# Patient Record
Sex: FEMALE | Race: WHITE | NOT HISPANIC OR LATINO | Employment: OTHER | URBAN - NONMETROPOLITAN AREA
[De-identification: names, ages, dates, MRNs, and addresses within clinical notes are randomized per-mention and may not be internally consistent; named-entity substitution may affect disease eponyms.]

---

## 2018-12-01 ENCOUNTER — HOSPITAL ENCOUNTER (EMERGENCY)
Facility: HOSPITAL | Age: 68
Discharge: HOME | End: 2018-12-01
Admitting: EMERGENCY MEDICINE

## 2018-12-01 VITALS
SYSTOLIC BLOOD PRESSURE: 143 MMHG | TEMPERATURE: 98.2 F | RESPIRATION RATE: 16 BRPM | DIASTOLIC BLOOD PRESSURE: 77 MMHG | HEART RATE: 98 BPM | BODY MASS INDEX: 27.48 KG/M2 | OXYGEN SATURATION: 100 % | WEIGHT: 140 LBS | HEIGHT: 60 IN

## 2018-12-01 DIAGNOSIS — M54.31 SCIATICA OF RIGHT SIDE: Primary | ICD-10-CM

## 2018-12-01 PROCEDURE — 99283 EMERGENCY DEPT VISIT LOW MDM: CPT

## 2018-12-01 RX ORDER — OXYCODONE HYDROCHLORIDE AND ACETAMINOPHEN 5; 325 MG/1; MG/1
1 TABLET ORAL ONCE
Status: COMPLETED | OUTPATIENT
Start: 2018-12-01 | End: 2018-12-01

## 2018-12-01 RX ORDER — PREDNISONE 20 MG/1
20 TABLET ORAL 2 TIMES DAILY
Qty: 10 TABLET | Refills: 0 | Status: SHIPPED | OUTPATIENT
Start: 2018-12-01 | End: 2019-01-20 | Stop reason: HOSPADM

## 2018-12-01 RX ORDER — ASPIRIN 81 MG/1
81 TABLET, CHEWABLE ORAL DAILY
COMMUNITY

## 2018-12-01 RX ORDER — HYDROCODONE BITARTRATE AND ACETAMINOPHEN 10; 325 MG/1; MG/1
1 TABLET ORAL ONCE
Status: COMPLETED | OUTPATIENT
Start: 2018-12-01 | End: 2018-12-01

## 2018-12-01 RX ORDER — CYCLOBENZAPRINE HCL 10 MG
10 TABLET ORAL EVERY 8 HOURS PRN
Qty: 10 TABLET | Refills: 0 | Status: SHIPPED | OUTPATIENT
Start: 2018-12-01 | End: 2019-01-29

## 2018-12-01 RX ORDER — IBUPROFEN 600 MG/1
600 TABLET ORAL EVERY 8 HOURS PRN
Qty: 30 TABLET | Refills: 0 | Status: SHIPPED | OUTPATIENT
Start: 2018-12-01 | End: 2019-01-29

## 2018-12-01 RX ORDER — DIAZEPAM 5 MG/1
5 TABLET ORAL ONCE
Status: COMPLETED | OUTPATIENT
Start: 2018-12-01 | End: 2018-12-01

## 2018-12-01 RX ADMIN — HYDROCODONE BITARTRATE AND ACETAMINOPHEN 1 TABLET: 10; 325 TABLET ORAL at 16:11

## 2018-12-01 RX ADMIN — DIAZEPAM 5 MG: 5 TABLET ORAL at 17:06

## 2018-12-01 RX ADMIN — OXYCODONE HYDROCHLORIDE AND ACETAMINOPHEN 1 TABLET: 5; 325 TABLET ORAL at 17:06

## 2018-12-01 NOTE — ED NOTES
Patient is a 68 year old female that presents to ER with right hip pain that has been ongoing for the past several months. Patient states that the pain is in her deep right hip and radiates to her knee. Patient states that the pain is a constant pain. Pain radiates from stabbing, deep, achy. Patient reports relief by bending over and removing pressure from her right hip      Yana Crow RN  12/01/18 9544

## 2018-12-01 NOTE — ED PROVIDER NOTES
Subjective   Mrs dang is a 68 year old female patient who presents today with complaints of a worsening of her right hip jesús. She states that she has had chronic posterior right hip pain that radiates toward the knee for 2 years. It has become worse over the past several weeks. She decribes the pain as deep, constant and worse with weightbearing. She states today the pain was unbearable which prompted her to be evaluated. She states she has not already sought out medical treatment because she is afraid of pain from a Cortizone injection. She has tried ibuprofen, heat, ice without sustaining relief. She denies any recent traumas, increased activities, urinary symptoms, fevers, weakness, numbness, tingling, saddle anesthesia or bowel incontinence. She is standing in room and pacing due to pain.        History provided by:  Patient   used: No        Review of Systems   Constitutional: Negative for chills and fever.   HENT: Negative for congestion, rhinorrhea, sore throat and trouble swallowing.    Eyes: Negative.  Negative for visual disturbance.   Respiratory: Negative.  Negative for cough, chest tightness and shortness of breath.    Cardiovascular: Negative.  Negative for chest pain and palpitations.   Gastrointestinal: Negative for abdominal pain, diarrhea, nausea and vomiting.   Endocrine: Negative.    Genitourinary: Negative for decreased urine volume.   Musculoskeletal: Positive for arthralgias (right hip). Negative for back pain and neck stiffness.   Skin: Negative for wound.   Allergic/Immunologic: Negative.    Neurological: Negative.  Negative for dizziness, weakness and headaches.   Hematological: Negative.    Psychiatric/Behavioral: Negative.        Past Medical History:   Diagnosis Date   • Hypertension        No Known Allergies    Past Surgical History:   Procedure Laterality Date   • APPENDECTOMY     • HYSTERECTOMY     • TUBAL ABDOMINAL LIGATION         History reviewed. No  "pertinent family history.    Social History     Socioeconomic History   • Marital status: Single     Spouse name: Not on file   • Number of children: Not on file   • Years of education: Not on file   • Highest education level: Not on file   Tobacco Use   • Smoking status: Current Every Day Smoker     Types: Cigarettes   Substance and Sexual Activity   • Alcohol use: No     Frequency: Never   • Drug use: No       /77   Pulse 98   Temp 98.2 °F (36.8 °C) (Temporal)   Resp 16   Ht 152.4 cm (60\")   Wt 63.5 kg (140 lb)   SpO2 100%   BMI 27.34 kg/m²       Objective   Physical Exam   Constitutional: She is oriented to person, place, and time. She appears well-developed and well-nourished. No distress.   HENT:   Head: Normocephalic and atraumatic.   Right Ear: External ear normal.   Left Ear: External ear normal.   Nose: Nose normal.   Mouth/Throat: Oropharynx is clear and moist.   Eyes: EOM are normal. Pupils are equal, round, and reactive to light.   Neck: Normal range of motion. Neck supple.   Cardiovascular: Normal rate and regular rhythm.   Pulmonary/Chest: Effort normal and breath sounds normal.   Abdominal: Soft. Bowel sounds are normal. There is no tenderness. There is no rebound and no guarding.   Musculoskeletal: Normal range of motion.        Right hip: She exhibits tenderness (posterior hip/gluteal trigger point). She exhibits normal strength, no swelling and no deformity.        Lumbar back: She exhibits normal range of motion, no bony tenderness, no swelling, no edema, no deformity and no laceration.        Legs:  Neurological: She is alert and oriented to person, place, and time. She has normal strength. No cranial nerve deficit or sensory deficit. Coordination normal. GCS eye subscore is 4. GCS verbal subscore is 5. GCS motor subscore is 6.   Skin: Skin is warm and dry.   Psychiatric: She has a normal mood and affect.   Nursing note and vitals reviewed.      Procedures           ED Course  ED " Course as of Dec 01 1841   Sat Dec 01, 2018   1615 Attempted to examine patient, she is pacing, standing, and states she can not be on exam table due to pain. Will give medication and attempt to complete exam  [BA]   1650 Attempt to exam patient, she states some improvement. Attempt to reexamine the patient. She was able to get on the table but could not relax to let me examine. Will order additional medication to attempt to control pain.   [BA]   1839 Attempt again to examine patient. She is laying in bed relaxed. She reports improvement in symptoms. She is declining any further workup, wishes to be discharged home. I have discussed the risks, patient understands. Follow up with PCP/orthopedics on Monday. Return precautions given.  [BA]      ED Course User Index  [BA] Agata Molina, ABELINO                  [facility]      Final diagnoses:   Sciatica of right side            Agata Molina, ABELINO  12/01/18 1841

## 2018-12-01 NOTE — DISCHARGE INSTRUCTIONS
I recommend avoiding heavy weights/exacerbating manuvers x 1 week. I recommend  therapeutic stretches/ Range of motion exercises twice daily. Take steroids as directed, do not take ibuprofen ect while taking steroids. Alternate heat and ice at least 3 times daily. Take NSAID (ibuprofen) after completed prednisone, take as directed and take as food, do not take OTC anti-inflammatories while taking prescription strength NSAID. Monitor for worsening symptoms like numbness and weakness (loss of strength not due to pain). Follow up with primary care provider. For any new or worsening symptoms you may return to ED

## 2018-12-08 ENCOUNTER — HOSPITAL ENCOUNTER (INPATIENT)
Facility: HOSPITAL | Age: 68
LOS: 5 days | Discharge: HOME HEALTH | End: 2018-12-13
Attending: EMERGENCY MEDICINE | Admitting: NEUROLOGICAL SURGERY

## 2018-12-08 ENCOUNTER — APPOINTMENT (OUTPATIENT)
Dept: CT IMAGING | Facility: HOSPITAL | Age: 68
End: 2018-12-08

## 2018-12-08 ENCOUNTER — APPOINTMENT (OUTPATIENT)
Dept: GENERAL RADIOLOGY | Facility: HOSPITAL | Age: 68
End: 2018-12-08

## 2018-12-08 ENCOUNTER — APPOINTMENT (OUTPATIENT)
Dept: MRI IMAGING | Facility: HOSPITAL | Age: 68
End: 2018-12-08

## 2018-12-08 DIAGNOSIS — S32.030D CLOSED COMPRESSION FRACTURE OF L3 LUMBAR VERTEBRA WITH ROUTINE HEALING, SUBSEQUENT ENCOUNTER: ICD-10-CM

## 2018-12-08 DIAGNOSIS — Z74.09 IMPAIRED FUNCTIONAL MOBILITY, BALANCE, GAIT, AND ENDURANCE: ICD-10-CM

## 2018-12-08 DIAGNOSIS — C80.1 MALIGNANT NEOPLASM METASTATIC TO LUMBAR SPINE WITH UNKNOWN PRIMARY SITE (HCC): ICD-10-CM

## 2018-12-08 DIAGNOSIS — Z78.9 DECREASED ACTIVITIES OF DAILY LIVING (ADL): ICD-10-CM

## 2018-12-08 DIAGNOSIS — M84.58XA PATHOLOGICAL FRACTURE OF VERTEBRA DUE TO NEOPLASTIC DISEASE, INITIAL ENCOUNTER: Primary | ICD-10-CM

## 2018-12-08 DIAGNOSIS — N90.89 VULVAR LESION: ICD-10-CM

## 2018-12-08 DIAGNOSIS — C80.1 MALIGNANCY (HCC): ICD-10-CM

## 2018-12-08 DIAGNOSIS — C79.51 MALIGNANT NEOPLASM METASTATIC TO LUMBAR SPINE WITH UNKNOWN PRIMARY SITE (HCC): ICD-10-CM

## 2018-12-08 PROBLEM — S32.030A COMPRESSION FRACTURE OF L3 LUMBAR VERTEBRA: Status: ACTIVE | Noted: 2018-12-08

## 2018-12-08 LAB
ABO GROUP BLD: NORMAL
ANION GAP SERPL CALCULATED.3IONS-SCNC: 8 MMOL/L (ref 4–13)
APTT PPP: 29 SECONDS (ref 24.1–34.8)
APTT PPP: 30.1 SECONDS (ref 24.1–34.8)
BACTERIA UR QL AUTO: ABNORMAL /HPF
BASOPHILS # BLD AUTO: 0.02 10*3/MM3 (ref 0–0.2)
BASOPHILS NFR BLD AUTO: 0.2 % (ref 0–2)
BILIRUB UR QL STRIP: NEGATIVE
BLD GP AB SCN SERPL QL: NEGATIVE
BUN BLD-MCNC: 18 MG/DL (ref 5–21)
BUN/CREAT SERPL: 28.6 (ref 7–25)
CALCIUM SPEC-SCNC: 8.5 MG/DL (ref 8.4–10.4)
CHLORIDE SERPL-SCNC: 99 MMOL/L (ref 98–110)
CLARITY UR: CLEAR
CO2 SERPL-SCNC: 28 MMOL/L (ref 24–31)
COLOR UR: YELLOW
CREAT BLD-MCNC: 0.63 MG/DL (ref 0.5–1.4)
DEPRECATED RDW RBC AUTO: 44 FL (ref 40–54)
EOSINOPHIL # BLD AUTO: 0.11 10*3/MM3 (ref 0–0.7)
EOSINOPHIL NFR BLD AUTO: 0.8 % (ref 0–4)
ERYTHROCYTE [DISTWIDTH] IN BLOOD BY AUTOMATED COUNT: 14.4 % (ref 12–15)
GFR SERPL CREATININE-BSD FRML MDRD: 94 ML/MIN/1.73
GLUCOSE BLD-MCNC: 101 MG/DL (ref 70–100)
GLUCOSE BLDC GLUCOMTR-MCNC: 96 MG/DL (ref 70–130)
GLUCOSE UR STRIP-MCNC: NEGATIVE MG/DL
HCT VFR BLD AUTO: 35.7 % (ref 37–47)
HGB BLD-MCNC: 12.1 G/DL (ref 12–16)
HGB UR QL STRIP.AUTO: ABNORMAL
HYALINE CASTS UR QL AUTO: ABNORMAL /LPF
IMM GRANULOCYTES # BLD: 0.06 10*3/MM3 (ref 0–0.03)
IMM GRANULOCYTES NFR BLD: 0.5 % (ref 0–5)
INR PPP: 0.97 (ref 0.91–1.09)
INR PPP: 0.97 (ref 0.91–1.09)
KETONES UR QL STRIP: NEGATIVE
LEUKOCYTE ESTERASE UR QL STRIP.AUTO: ABNORMAL
LYMPHOCYTES # BLD AUTO: 1.04 10*3/MM3 (ref 0.72–4.86)
LYMPHOCYTES NFR BLD AUTO: 7.8 % (ref 15–45)
MCH RBC QN AUTO: 28.8 PG (ref 28–32)
MCHC RBC AUTO-ENTMCNC: 33.9 G/DL (ref 33–36)
MCV RBC AUTO: 85 FL (ref 82–98)
MONOCYTES # BLD AUTO: 0.57 10*3/MM3 (ref 0.19–1.3)
MONOCYTES NFR BLD AUTO: 4.3 % (ref 4–12)
NEUTROPHILS # BLD AUTO: 11.45 10*3/MM3 (ref 1.87–8.4)
NEUTROPHILS NFR BLD AUTO: 86.4 % (ref 39–78)
NITRITE UR QL STRIP: NEGATIVE
NRBC BLD MANUAL-RTO: 0 /100 WBC (ref 0–0)
PH UR STRIP.AUTO: 7 [PH] (ref 5–8)
PLATELET # BLD AUTO: 444 10*3/MM3 (ref 130–400)
PMV BLD AUTO: 9.3 FL (ref 6–12)
POTASSIUM BLD-SCNC: 4.1 MMOL/L (ref 3.5–5.3)
PROT UR QL STRIP: NEGATIVE
PROTHROMBIN TIME: 13.2 SECONDS (ref 11.9–14.6)
PROTHROMBIN TIME: 13.2 SECONDS (ref 11.9–14.6)
RBC # BLD AUTO: 4.2 10*6/MM3 (ref 4.2–5.4)
RBC # UR: ABNORMAL /HPF
REF LAB TEST METHOD: ABNORMAL
RH BLD: POSITIVE
SODIUM BLD-SCNC: 135 MMOL/L (ref 135–145)
SP GR UR STRIP: 1.02 (ref 1–1.03)
SQUAMOUS #/AREA URNS HPF: ABNORMAL /HPF
T&S EXPIRATION DATE: NORMAL
UROBILINOGEN UR QL STRIP: ABNORMAL
WBC NRBC COR # BLD: 13.25 10*3/MM3 (ref 4.8–10.8)
WBC UR QL AUTO: ABNORMAL /HPF

## 2018-12-08 PROCEDURE — 25010000002 DEXAMETHASONE PER 1 MG: Performed by: EMERGENCY MEDICINE

## 2018-12-08 PROCEDURE — 85730 THROMBOPLASTIN TIME PARTIAL: CPT | Performed by: EMERGENCY MEDICINE

## 2018-12-08 PROCEDURE — 85730 THROMBOPLASTIN TIME PARTIAL: CPT | Performed by: NEUROLOGICAL SURGERY

## 2018-12-08 PROCEDURE — 99221 1ST HOSP IP/OBS SF/LOW 40: CPT | Performed by: OBSTETRICS & GYNECOLOGY

## 2018-12-08 PROCEDURE — 86850 RBC ANTIBODY SCREEN: CPT | Performed by: EMERGENCY MEDICINE

## 2018-12-08 PROCEDURE — 25010000002 KETOROLAC TROMETHAMINE PER 15 MG: Performed by: EMERGENCY MEDICINE

## 2018-12-08 PROCEDURE — 86900 BLOOD TYPING SEROLOGIC ABO: CPT | Performed by: EMERGENCY MEDICINE

## 2018-12-08 PROCEDURE — 71260 CT THORAX DX C+: CPT

## 2018-12-08 PROCEDURE — 94760 N-INVAS EAR/PLS OXIMETRY 1: CPT

## 2018-12-08 PROCEDURE — 0 GADOBENATE DIMEGLUMINE 529 MG/ML SOLUTION: Performed by: EMERGENCY MEDICINE

## 2018-12-08 PROCEDURE — 85610 PROTHROMBIN TIME: CPT | Performed by: EMERGENCY MEDICINE

## 2018-12-08 PROCEDURE — 80048 BASIC METABOLIC PNL TOTAL CA: CPT | Performed by: EMERGENCY MEDICINE

## 2018-12-08 PROCEDURE — 25010000002 IOPAMIDOL 61 % SOLUTION: Performed by: NEUROLOGICAL SURGERY

## 2018-12-08 PROCEDURE — 74177 CT ABD & PELVIS W/CONTRAST: CPT

## 2018-12-08 PROCEDURE — 25010000002 ONDANSETRON PER 1 MG: Performed by: EMERGENCY MEDICINE

## 2018-12-08 PROCEDURE — 94799 UNLISTED PULMONARY SVC/PX: CPT

## 2018-12-08 PROCEDURE — 25010000002 MORPHINE PER 10 MG: Performed by: NEUROLOGICAL SURGERY

## 2018-12-08 PROCEDURE — 25010000002 MORPHINE PER 10 MG: Performed by: EMERGENCY MEDICINE

## 2018-12-08 PROCEDURE — A9577 INJ MULTIHANCE: HCPCS | Performed by: EMERGENCY MEDICINE

## 2018-12-08 PROCEDURE — 0 IOHEXOL 300 MG/ML SOLUTION: Performed by: NEUROLOGICAL SURGERY

## 2018-12-08 PROCEDURE — 25010000002 ORPHENADRINE CITRATE PER 60 MG: Performed by: EMERGENCY MEDICINE

## 2018-12-08 PROCEDURE — 82962 GLUCOSE BLOOD TEST: CPT

## 2018-12-08 PROCEDURE — 86901 BLOOD TYPING SEROLOGIC RH(D): CPT | Performed by: EMERGENCY MEDICINE

## 2018-12-08 PROCEDURE — 73502 X-RAY EXAM HIP UNI 2-3 VIEWS: CPT

## 2018-12-08 PROCEDURE — 99284 EMERGENCY DEPT VISIT MOD MDM: CPT | Performed by: NEUROLOGICAL SURGERY

## 2018-12-08 PROCEDURE — 85610 PROTHROMBIN TIME: CPT | Performed by: NEUROLOGICAL SURGERY

## 2018-12-08 PROCEDURE — 85025 COMPLETE CBC W/AUTO DIFF WBC: CPT | Performed by: EMERGENCY MEDICINE

## 2018-12-08 PROCEDURE — 72131 CT LUMBAR SPINE W/O DYE: CPT

## 2018-12-08 PROCEDURE — 99285 EMERGENCY DEPT VISIT HI MDM: CPT

## 2018-12-08 PROCEDURE — 72158 MRI LUMBAR SPINE W/O & W/DYE: CPT

## 2018-12-08 PROCEDURE — 87086 URINE CULTURE/COLONY COUNT: CPT | Performed by: NEUROLOGICAL SURGERY

## 2018-12-08 PROCEDURE — 63710000001 DEXAMETHASONE PER 0.25 MG: Performed by: NEUROLOGICAL SURGERY

## 2018-12-08 PROCEDURE — 81001 URINALYSIS AUTO W/SCOPE: CPT | Performed by: NEUROLOGICAL SURGERY

## 2018-12-08 PROCEDURE — 25010000002 LORAZEPAM PER 2 MG: Performed by: EMERGENCY MEDICINE

## 2018-12-08 RX ORDER — SODIUM CHLORIDE 9 MG/ML
75 INJECTION, SOLUTION INTRAVENOUS CONTINUOUS
Status: DISCONTINUED | OUTPATIENT
Start: 2018-12-08 | End: 2018-12-10 | Stop reason: SDUPTHER

## 2018-12-08 RX ORDER — LIDOCAINE 50 MG/G
1 PATCH TOPICAL
Status: DISCONTINUED | OUTPATIENT
Start: 2018-12-08 | End: 2018-12-13 | Stop reason: HOSPADM

## 2018-12-08 RX ORDER — MORPHINE SULFATE 2 MG/ML
2 INJECTION, SOLUTION INTRAMUSCULAR; INTRAVENOUS
Status: DISCONTINUED | OUTPATIENT
Start: 2018-12-08 | End: 2018-12-09

## 2018-12-08 RX ORDER — DEXAMETHASONE 4 MG/1
4 TABLET ORAL EVERY 12 HOURS SCHEDULED
Status: DISCONTINUED | OUTPATIENT
Start: 2018-12-08 | End: 2018-12-13 | Stop reason: HOSPADM

## 2018-12-08 RX ORDER — LORAZEPAM 2 MG/ML
1 INJECTION INTRAMUSCULAR ONCE
Status: COMPLETED | OUTPATIENT
Start: 2018-12-08 | End: 2018-12-08

## 2018-12-08 RX ORDER — KETOROLAC TROMETHAMINE 15 MG/ML
15 INJECTION, SOLUTION INTRAMUSCULAR; INTRAVENOUS ONCE
Status: COMPLETED | OUTPATIENT
Start: 2018-12-08 | End: 2018-12-08

## 2018-12-08 RX ORDER — ORPHENADRINE CITRATE 30 MG/ML
60 INJECTION INTRAMUSCULAR; INTRAVENOUS ONCE
Status: COMPLETED | OUTPATIENT
Start: 2018-12-08 | End: 2018-12-08

## 2018-12-08 RX ORDER — BISACODYL 10 MG
10 SUPPOSITORY, RECTAL RECTAL DAILY PRN
Status: DISCONTINUED | OUTPATIENT
Start: 2018-12-08 | End: 2018-12-10 | Stop reason: SDUPTHER

## 2018-12-08 RX ORDER — NALOXONE HCL 0.4 MG/ML
0.4 VIAL (ML) INJECTION
Status: DISCONTINUED | OUTPATIENT
Start: 2018-12-08 | End: 2018-12-09

## 2018-12-08 RX ORDER — OXYCODONE AND ACETAMINOPHEN 7.5; 325 MG/1; MG/1
1 TABLET ORAL EVERY 4 HOURS PRN
Status: DISCONTINUED | OUTPATIENT
Start: 2018-12-08 | End: 2018-12-09

## 2018-12-08 RX ORDER — SODIUM CHLORIDE 0.9 % (FLUSH) 0.9 %
3-10 SYRINGE (ML) INJECTION AS NEEDED
Status: DISCONTINUED | OUTPATIENT
Start: 2018-12-08 | End: 2018-12-10 | Stop reason: SDUPTHER

## 2018-12-08 RX ORDER — DEXAMETHASONE SODIUM PHOSPHATE 10 MG/ML
10 INJECTION INTRAMUSCULAR; INTRAVENOUS ONCE
Status: COMPLETED | OUTPATIENT
Start: 2018-12-08 | End: 2018-12-08

## 2018-12-08 RX ORDER — SENNA AND DOCUSATE SODIUM 50; 8.6 MG/1; MG/1
2 TABLET, FILM COATED ORAL 2 TIMES DAILY PRN
Status: DISCONTINUED | OUTPATIENT
Start: 2018-12-08 | End: 2018-12-10 | Stop reason: SDUPTHER

## 2018-12-08 RX ORDER — ONDANSETRON 2 MG/ML
4 INJECTION INTRAMUSCULAR; INTRAVENOUS ONCE
Status: COMPLETED | OUTPATIENT
Start: 2018-12-08 | End: 2018-12-08

## 2018-12-08 RX ORDER — ACETAMINOPHEN 650 MG/1
650 SUPPOSITORY RECTAL EVERY 4 HOURS PRN
Status: DISCONTINUED | OUTPATIENT
Start: 2018-12-08 | End: 2018-12-13 | Stop reason: HOSPADM

## 2018-12-08 RX ORDER — SODIUM CHLORIDE 0.9 % (FLUSH) 0.9 %
3 SYRINGE (ML) INJECTION EVERY 12 HOURS SCHEDULED
Status: DISCONTINUED | OUTPATIENT
Start: 2018-12-08 | End: 2018-12-10 | Stop reason: SDUPTHER

## 2018-12-08 RX ADMIN — OXYCODONE HYDROCHLORIDE AND ACETAMINOPHEN 1 TABLET: 7.5; 325 TABLET ORAL at 23:06

## 2018-12-08 RX ADMIN — MORPHINE SULFATE 2 MG: 2 INJECTION, SOLUTION INTRAMUSCULAR; INTRAVENOUS at 21:48

## 2018-12-08 RX ADMIN — MORPHINE SULFATE 4 MG: 4 INJECTION INTRAVENOUS at 03:34

## 2018-12-08 RX ADMIN — OXYCODONE HYDROCHLORIDE AND ACETAMINOPHEN 1 TABLET: 7.5; 325 TABLET ORAL at 17:28

## 2018-12-08 RX ADMIN — KETOROLAC TROMETHAMINE 15 MG: 15 INJECTION, SOLUTION INTRAMUSCULAR; INTRAVENOUS at 03:33

## 2018-12-08 RX ADMIN — ORPHENADRINE CITRATE 60 MG: 60 INJECTION INTRAMUSCULAR; INTRAVENOUS at 03:35

## 2018-12-08 RX ADMIN — DEXAMETHASONE SODIUM PHOSPHATE 10 MG: 10 INJECTION INTRAMUSCULAR; INTRAVENOUS at 03:33

## 2018-12-08 RX ADMIN — DEXAMETHASONE 4 MG: 4 TABLET ORAL at 21:48

## 2018-12-08 RX ADMIN — LIDOCAINE 1 PATCH: 50 PATCH CUTANEOUS at 04:03

## 2018-12-08 RX ADMIN — LORAZEPAM 1 MG: 2 INJECTION INTRAMUSCULAR; INTRAVENOUS at 05:55

## 2018-12-08 RX ADMIN — IOHEXOL 50 ML: 300 INJECTION, SOLUTION INTRAVENOUS at 12:36

## 2018-12-08 RX ADMIN — ONDANSETRON HYDROCHLORIDE 4 MG: 2 INJECTION INTRAMUSCULAR; INTRAVENOUS at 03:32

## 2018-12-08 RX ADMIN — OXYCODONE HYDROCHLORIDE AND ACETAMINOPHEN 1 TABLET: 7.5; 325 TABLET ORAL at 11:00

## 2018-12-08 RX ADMIN — SODIUM CHLORIDE 75 ML/HR: 9 INJECTION, SOLUTION INTRAVENOUS at 21:48

## 2018-12-08 RX ADMIN — IOPAMIDOL 150 ML: 612 INJECTION, SOLUTION INTRAVENOUS at 15:59

## 2018-12-08 RX ADMIN — SODIUM CHLORIDE, PRESERVATIVE FREE 3 ML: 5 INJECTION INTRAVENOUS at 21:48

## 2018-12-08 RX ADMIN — MORPHINE SULFATE 2 MG: 2 INJECTION, SOLUTION INTRAMUSCULAR; INTRAVENOUS at 14:28

## 2018-12-08 RX ADMIN — GADOBENATE DIMEGLUMINE 10 ML: 529 INJECTION, SOLUTION INTRAVENOUS at 06:25

## 2018-12-08 NOTE — H&P
Date of Admission: 2018  Primary Care Physician: Provider, No Known        Subjective:    Chief complaint back pain     HPI: 69 yo female with year long history of back pain that has gotten much worse the last several months.  She describes right paraspinal back pain with right lower extremity radicular pain down the posterior aspect of her thigh to her knee.  She was seen in the emergency room about a week ago but could not tolerate the imaging that was required and left.  She was given steroids which were helpful for short period of time.  But the pain gradually got worse until last night.  She describes numbness and tingling in the leg also.  She denies any bowel or bladder issues.  She says that at this point she can barely walk and her mobility is severely limited.  She has not seen a primary care doctor in recent memory.  She has had no colonoscopies Pap smears mammograms recently.    Review of Systems   Musculoskeletal: Positive for back pain.   Neurological: Positive for numbness.   All other systems reviewed and are negative.       Past Medical History:   Past Medical History:   Diagnosis Date   • Hypertension        Past Surgical History:   Past Surgical History:   Procedure Laterality Date   • APPENDECTOMY     • HYSTERECTOMY     • TUBAL ABDOMINAL LIGATION         Family History:Mother  of pneumonia, father  of congestive heart failure.  Social History:  reports that she has been smoking cigarettes.  She does not have any smokeless tobacco history on file. She reports that she does not drink alcohol or use drugs.  75-pack-year smoker.  Fully functional activities of daily living.  Does not drive.    Code Status: full    Medications:    (Not in a hospital admission)    Allergies:  No Known Allergies    Objective:  Physical Exam   Constitutional: She is oriented to person, place, and time. She appears well-developed and well-nourished.   Cardiovascular: Normal rate, regular rhythm and  normal heart sounds.   Pulmonary/Chest: Effort normal. No respiratory distress.   Abdominal: Soft. She exhibits no distension. There is no tenderness.   Neurological: She is oriented to person, place, and time.   Psychiatric: Her speech is normal.       Neurologic Exam     Mental Status   Oriented to person, place, and time.   Speech: speech is normal   Level of consciousness: alert  Knowledge: good.     Cranial Nerves   Cranial nerves II through XII intact.     Motor Exam   Muscle bulk: normal  Overall muscle tone: normal  Right arm pronator drift: absent  Left arm pronator drift: absent    Strength   Strength 5/5 except as noted.   Right iliopsoas: 2/5      Vital Signs  Temp:  [98.4 °F (36.9 °C)] 98.4 °F (36.9 °C)  Heart Rate:  [100-109] 108  Resp:  [14-20] 14  BP: (133-150)/(64-87) 139/76        Results Review:  I reviewed the patient's new clinical results.  I reviewed the patient's new imaging results and agree with the interpretation.  I reviewed the patient's other test results and agree with the interpretation         Lab Results (last 24 hours)     Procedure Component Value Units Date/Time    aPTT [[id number]]  (Normal) Collected:  12/08/18 0454    Specimen:  Blood Updated:  12/08/18 0522     PTT 29.0 seconds     Protime-INR [[id number]]  (Normal) Collected:  12/08/18 0454    Specimen:  Blood Updated:  12/08/18 0522     Protime 13.2 Seconds      INR 0.97    Basic Metabolic Panel [[id number]]  (Abnormal) Collected:  12/08/18 0454    Specimen:  Blood Updated:  12/08/18 0522     Glucose 101 mg/dL      BUN 18 mg/dL      Creatinine 0.63 mg/dL      Sodium 135 mmol/L      Potassium 4.1 mmol/L      Chloride 99 mmol/L      CO2 28.0 mmol/L      Calcium 8.5 mg/dL      eGFR Non African Amer 94 mL/min/1.73      BUN/Creatinine Ratio 28.6     Anion Gap 8.0 mmol/L     Narrative:       GFR Normal >60  Chronic Kidney Disease <60  Kidney Failure <15    CBC & Differential [[id number]] Collected:  12/08/18 0454    Specimen:  Blood  Updated:  12/08/18 0513    Narrative:       The following orders were created for panel order CBC & Differential.  Procedure                               Abnormality         Status                     ---------                               -----------         ------                     CBC Auto Differential[[id number]]        Abnormal            Final result                 Please view results for these tests on the individual orders.    CBC Auto Differential [[id number]]  (Abnormal) Collected:  12/08/18 0454    Specimen:  Blood Updated:  12/08/18 0513     WBC 13.25 10*3/mm3      RBC 4.20 10*6/mm3      Hemoglobin 12.1 g/dL      Hematocrit 35.7 %      MCV 85.0 fL      MCH 28.8 pg      MCHC 33.9 g/dL      RDW 14.4 %      RDW-SD 44.0 fl      MPV 9.3 fL      Platelets 444 10*3/mm3      Neutrophil % 86.4 %      Lymphocyte % 7.8 %      Monocyte % 4.3 %      Eosinophil % 0.8 %      Basophil % 0.2 %      Immature Grans % 0.5 %      Neutrophils, Absolute 11.45 10*3/mm3      Lymphocytes, Absolute 1.04 10*3/mm3      Monocytes, Absolute 0.57 10*3/mm3      Eosinophils, Absolute 0.11 10*3/mm3      Basophils, Absolute 0.02 10*3/mm3      Immature Grans, Absolute 0.06 10*3/mm3      nRBC 0.0 /100 WBC     POC Glucose Once [[id number]]  (Normal) Collected:  12/08/18 0328    Specimen:  Blood Updated:  12/08/18 0348     Glucose 96 mg/dL      Comment: : [id number] Jesse LipscombwMeter ID: [id number]             Imaging Results (last 24 hours)     Procedure Component Value Units Date/Time    CT Lumbar Spine Without Contrast [[id number]] Collected:  12/08/18 0658     Updated:  12/08/18 0735    Addenda:        Addendum: With additional imaging of the spine with MR imaging I am  concerned that the adrenal lesions and L3 fracture most likely represent  sequela of metastatic disease. The L3 vertebral body is somewhat  permeative in appearance which would also suggest the possibility of a  pathologic fracture related to metastatic disease. I  suspect the ventral  mass within the spinal canal is related to neoplastic extension into the  spinal canal. There is a suspected lesion within the left lower lobe on  the upper most cut of today's exam and I suspect the constellation of  findings is related to a primary lung carcinoma with metastatic disease  to the adrenal glands and the L3 vertebral body. I spoke with Rin Ryan in the emergency room at 7:30 AM concerning the findings and  concern for metastatic disease resulting in pathologic fracture at L3.  This report was finalized on 12/08/2018 07:32 by Dr. Tj Paredes MD.  Signed:  12/08/18 0732 by Tj Paredes MD    Narrative:       CT LUMBAR SPINE WO CONTRAST- 12/8/2018 3:54 AM CST     History: low back pain     Comparison: None      DLP: 675 mGy cm. Automated exposure control was utilized to diminish  patient radiation dose.     Technique: Serial helical tomographic images of the lumbar spine were  obtained without the use of intravenous contrast. Additionally, sagittal  and coronal reformatted images were provided for review.      Findings:   Alignment: Normal.     Bones: There is an acute 2 column fracture involving the anterior and  middle column of the L3 vertebral body. There is involvement of the  right L3 pedicle. There is no evidence of retropulsion. Centrally there  is an 80% loss of height. Vertebral body heights are otherwise  maintained.     Disc spaces: Maintained.     Canal and neuroforamina: There is no retropulsion at the L3 fracture  there is a 1.4 x 1.0 cm slightly hyperdense extradural mass with  associated effacement of the thecal sac. I feel this likely represents  an epidural hematoma follow-up with MRI would be recommended if not  contraindicated in this patient. Broad-based bulging of disc as well as  facet and ligamentous hypertrophy at the L3-L4, L4-L5 and L5-S1 level  contributes to central and foraminal stenosis.     Soft tissues: A 6.4 cm right super renal  mass is demonstrated measuring  Hounsfield units of 34 suggesting it to be solid in nature. This would  also warrant follow-up. Also suspect a left adrenal lesion although not  as well-visualized. Metastatic disease is not entirely excluded.       Impression:       Impression:   1. Acute 2 column fracture involving anterior middle columns of the L3  vertebral body with involvement of the right pedicle. There is no  evidence of retropulsion of the posterior aspect of the vertebral body  but there is evidence of thecal sac compression secondary to what I  suspect is an epidural hematoma along the ventral aspect of the thecal  sac. This measures approximately 1.4 x 1.0 cm in size. Follow-up imaging  with MRI is recommended if not contraindicated in this patient.  2. Large right suprarenal mass most likely related to the adrenal gland  but incompletely imaged. Adrenal cortical carcinoma is not excluded  given the size of this lesion. Given the history of trauma and adrenal  hematoma would also be in the differential but considered less likely.  Metastatic disease should also be considered. Some fullness within the  left suprarenal space is also suspicious for a left adrenal lesion  although incompletely imaged..        This report was finalized on 12/08/2018 07:07 by Dr. Tj Paredes MD.    MRI Lumbar Spine With & Without Contrast [[id number]] Collected:  12/08/18 0707     Updated:  12/08/18 0732    Narrative:       MRI LUMBAR SPINE W WO CONTRAST- 12/8/2018 6:05 AM CST     HISTORY: rule out epidural hematoma     COMPARISON: CT exam of earlier the same day.      TECHNIQUE: Multiplanar, multisequence MRI of the lumbar spine was  performed with and without the use of contrast.     FINDINGS:      Alignment: There are presumed to be 5 lumbar-type vertebrae with the  most inferior being labeled L5. Normal lumbar lordosis is present. There  is a 2 column fracture involving the anterior and middle columns of the  L3  vertebral body with an approximate 80% loss of height centrally.  There is no evidence of retropulsion of the vertebral body. There is  diffuse edema within the vertebral body as would be anticipated.. As  suspected by CT examination there is a ventral mass along the posterior  margin of the L3 vertebral body measuring approximately 2.2 cm in  transverse dimension by 1.1 cm in anterior to posterior dimension. This  is resulting in effacement of the thecal sac with moderate central  spinal stenosis. The anterior posterior dimension of the canal is  narrowed to 6 mm at this level.     Marrow signal: There is diffuse abnormal signal within the L3 vertebral  body. There is otherwise normal marrow signal throughout the lumbar  spine. Degenerative disc disease is noted lower 2 lumbar disc levels  with mild disc desiccation..      Cord/Canal: The conus medullaris terminates at the level of L1. The  visualized spinal cord is normal in signal and morphology.      Soft tissues: Paraspinal soft tissue is noted related at the level of  the fractured L3 representing mild paraspinal hematoma versus tumor..  Bilateral adrenal masses are present each of which measure over 6 cm in  size. Given the size of the lesions adenomas are considered unlikely.  Follow-up with CT imaging of the chest, abdomen and pelvis is  recommended. Metastatic disease is not excluded.     Levels:        L1-L2: No disc bulge is present. There is moderate facet and mild  ligamentous hypertrophy. No evidence of central or foraminal stenosis..      L2-L3: There is mild bulging of disc as well as moderate facet and  ligamentous hypertrophy with mild ventral and posterolateral effacement  of the thecal sac. There is no central spinal stenosis. Bulging of the  disc and facet overgrowth does contribute to mild bilateral neural  foraminal narrowing..      L3-L4: Above the L3-L4 disc level there is the previously discussed  epidural mass which is resulting in  effacement of the thecal sac with  moderate to severe central spinal stenosis. At the L3-L4 disc level  there is broad-based bulging disc as well as moderate facet and  ligamentous hypertrophy also contributing to mild central spinal  stenosis. There is severe bilateral neural foraminal narrowing related  to bulging disc and facet overgrowth along the under margin of the  foramen and the epidural hematoma along the upper margin of both  foramen..      L4-L5: There is broad-based bulging of the disc with an annular fissure  along the posterior central annulus. There is moderate facet and  ligamentous hypertrophy with moderate central spinal stenosis. Facet and  ligamentous hypertrophy and bulging of the disc results in severe  bilateral neural foraminal narrowing..      L5-S1: There is bulging of disc with a small central disc protrusion.  There is moderate facet and ligamentous hypertrophy. No evidence of  central spinal stenosis. Severe right-sided and moderate left-sided  foraminal narrowing is present..      Postcontrast: There is diffuse enhancement associated with the L3  vertebral body. There is also enhancement associated with the epidural  mass. Although some enhancement could be anticipated associated with a  fracture the enhancement of the epidural mass I feel is concerning for a  pathologic fracture with epidural extension of neoplasia. Bilateral  bilateral adrenal mass lesions demonstrating peripheral enhancement with  a question with some central necrosis is also suggestive of metastatic  disease.. The right lesion measures 6.1 cm and the left adrenal lesion  6.2 cm in size. Adrenal metastasis should be considered in the  differential. Follow-up imaging of the chest, abdomen and pelvis would  be suggested, particularly if the patient is a smoker.       Impression:       1. Acute 2 column fracture involving the L3 vertebral body involving the  anterior and middle column and right pedicle with associated  edema and  paraspinal soft tissue consistent with either hematoma or neoplasia.  There is no retropulsion of the posterior aspect of the vertebral body  but there is a moderate size epidural mass within the ventral spinal  canal resulting in effacement of the thecal sac and moderate to severe  central spinal stenosis. This is also contributing to neural foraminal  narrowing bilaterally at the L3-L4 disc level related to a combination  of bulging disc and facet overgrowth and the epidural mass. Given the  findings within the adrenals and the enhancement of the epidural mass  I'm concerned this represents a pathologic fracture related to  metastatic disease with associated extension into the ventral epidural  space. The patient does not give a history of any recent trauma making a  posttraumatic fracture at L3 less likely. No other discrete spinal  lesions are demonstrated. I would suggest follow-up with a CT of the  chest, abdomen and pelvis to further evaluate for potential metastatic  disease, particularly if the patient is a smoker. The adrenal lesions  are worrisome for metastatic disease.  2. Central and foraminal stenosis at other levels related to bulging the  disc as well as facet and ligamentous hypertrophy..  3. Bilateral adrenal masses. Follow-up CT imaging is recommended.  Metastatic disease is not excluded.        This report was finalized on 12/08/2018 07:28 by Dr. Tj Paredes MD.    XR Hip With or Without Pelvis 2 - 3 View Right [[id number]] Collected:  12/08/18 0656     Updated:  12/08/18 0700    Narrative:       EXAMINATION: AP pelvis and two-view right hip 12/8/2018     HISTORY: Right hip pain     FINDINGS: AP radiograph of the pelvis as well as two-view exam of the  right hip demonstrates no fracture or dislocation. The joint space is  well-preserved. The femoral head is concentric and well located within  the acetabulum. No evidence of AVN.       Impression:       . Normal exam of the pelvis  and right hip.  This report was finalized on 12/08/2018 06:57 by Dr. Tj Paredes MD.             There are no active hospital problems to display for this patient.      Assessment/Plan:   CT scan and MRI of the lumbar spine with and without contrast demonstrates a pathologic likely metastatic compression fracture of L3 with some ventral epidural extension of tumor.  The radiologist also notes extensive solid tumor in involving both adrenal glands.    The patient immediately admitted to the hospital.  We will treat her pain and treat her symptomatically.  We will mobilize her with physical therapy.  Regarding her compression fracture and metastatic disease at L3 she may ultimately be a candidate for radiofrequency treatment and kyphoplasty at that level she may also need a laminectomy.  In addition we will work up the new diagnosis of possible metastatic cancer with a CT scan of the chest abdomen pelvis and a new pair medicine bone study.    I discussed the patients findings and my recommendations with patient, family and nursing staff    Erwin Benitez MD  12/08/18  8:31 AM    Time: More than 50% of time spent in counseling and coordination of care:  Total face-to-face/floor time 60 min.  Time spent in counseling 30 min. Counseling included the following topics: Diagnosis, condition, treatment, plan

## 2018-12-08 NOTE — ED PROVIDER NOTES
Subjective   History of Present Illness    Review of Systems    Past Medical History:   Diagnosis Date   • Hypertension        No Known Allergies    Past Surgical History:   Procedure Laterality Date   • APPENDECTOMY     • HYSTERECTOMY     • TUBAL ABDOMINAL LIGATION         History reviewed. No pertinent family history.    Social History     Socioeconomic History   • Marital status: Single     Spouse name: Not on file   • Number of children: Not on file   • Years of education: Not on file   • Highest education level: Not on file   Tobacco Use   • Smoking status: Current Every Day Smoker     Types: Cigarettes   Substance and Sexual Activity   • Alcohol use: No     Frequency: Never   • Drug use: No           Objective   Physical Exam    Procedures           ED Course  ED Course as of Dec 08 0855   Sat Dec 08, 2018   0845 Dr Benitez seeing the pt will admit   [TS]   0846 This patient's CT scan was read by stat read as L3 compression fracture and a possible epidural hematoma patient has no evidence of cauda equina syndrome  [TS]   0853 It turns out the patient has 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

## 2018-12-08 NOTE — ED PROVIDER NOTES
"Subjective   69 y/o female arrives for evaluation of right lower back pain for TWO YEARS for which she has refused to follow with anyone but the ED actually coming in twice this month in the last week. The patient denies falls or trauma. She notes the pain has not changed location noting it to her right lower back/hip with radiation down her right leg. She was seen here recently and apparently did not get imaging as she could not \"put my foot down\" however upon review of the NP's chart she was seen \"pacing\" the room towards the end of her visit. She tells me the motrin given her \"doesn't touch anything.\" She denies numbness or tingling, loss of bowel or bladder control, saddle anesthesia, abdominal pain, cp, sob, flank pain, dysuria, hematuria, nausea, vomiting, diarrhea, prior back surgeries, cancer treatments, weight gain or loss, palpitations or any other issues. During our interview she does become somewhat demanding when informed that while I would treat her pain it would, likely, not return to a 0/10 given the chronicity of the symptoms. Further, when cautioned about her age and the effects of muscle relaxer and narcotics on her respiratory drive that could result in death she became even more demanding.         Family, social and past history reviewed as below, prior documentation of H and Ps and other documentation are reviewed:    Past Medical History:  No date: Hypertension    Past Surgical History:  No date: APPENDECTOMY  No date: HYSTERECTOMY  No date: TUBAL ABDOMINAL LIGATION    Social History    Socioeconomic History      Marital status: Single      Spouse name: Not on file      Number of children: Not on file      Years of education: Not on file      Highest education level: Not on file    Social Needs      Financial resource strain: Not on file      Food insecurity - worry: Not on file      Food insecurity - inability: Not on file      Transportation needs - medical: Not on file      Transportation " needs - non-medical: Not on file    Occupational History      Not on file    Tobacco Use      Smoking status: Current Every Day Smoker        Types: Cigarettes    Substance and Sexual Activity      Alcohol use: No        Frequency: Never      Drug use: No      Sexual activity: Not on file    Other Topics      Concerns:        Not on file    Social History Narrative      Not on file      Family history: reviewed and noncontributory             Review of Systems   All other systems reviewed and are negative.      Past Medical History:   Diagnosis Date   • Hypertension        No Known Allergies    Past Surgical History:   Procedure Laterality Date   • APPENDECTOMY     • HYSTERECTOMY     • TUBAL ABDOMINAL LIGATION         History reviewed. No pertinent family history.    Social History     Socioeconomic History   • Marital status: Single     Spouse name: Not on file   • Number of children: Not on file   • Years of education: Not on file   • Highest education level: Not on file   Tobacco Use   • Smoking status: Current Every Day Smoker     Types: Cigarettes   Substance and Sexual Activity   • Alcohol use: No     Frequency: Never   • Drug use: No           Objective   Physical Exam   Constitutional: She is oriented to person, place, and time. She appears well-developed and well-nourished.   HENT:   Head: Normocephalic.   Nose: Nose normal.   Eyes: Conjunctivae and EOM are normal. Pupils are equal, round, and reactive to light.   Abdominal: Soft. Bowel sounds are normal. She exhibits no distension and no mass. There is no tenderness. There is no guarding. No hernia.   Musculoskeletal:   Pedal and dp are 2/4 bilaterally,     +straight leg raise    Intact DTRs    She has pain over the right psis    No midline tenderness, no step offs, no deformities, no CVA tenderness.    Neurological: She is alert and oriented to person, place, and time. She displays normal reflexes. No cranial nerve deficit or sensory deficit. She exhibits  normal muscle tone. Coordination normal.   Strength of calves, hamstrings, quads are 5/5 bilaterally    Sensation is intact.    Skin: Skin is warm. Capillary refill takes less than 2 seconds.   Psychiatric: She has a normal mood and affect. Her behavior is normal.   Vitals reviewed.      Procedures           ED Course    CT shows: acute L3 compression fracture with 50% anterior, 30% posterior height loss. Sublte increased density in the epidural space along the posterior margin of the vertebral body may reprsent epidural hematoma.     Dr. Benitez agrees with MRI    Patient states pain has abated, she is lifting both legs from the bed at this time without issue. Aware of MRI.     MRI tech called in emergently    Endorsed to Dr. 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 Dominant mass within the left lower lobe of the lung. There is   associated hilar and middle mediastinal adenopathy. A left paraspinal   cristi mass is also present as well as a satellite lesion and pleural   studding.. Also noted is an enlarged interaortocaval node and some   enlarged periportal nodes. There is a ill-defined hypodense lesion   involving the posterior interpolar aspect of the left kidney suspicious   for metastatic disease. No evidence of hepatic lesions. Marked   enlargement and heterogeneity is noted of both adrenal glands consistent   with large adrenal metastases. Given the appearance of the lesions and   distribution I suspect this may  represent a small cell lung carcinoma. A   borderline enlarged left para-aortic node is also present.   2. Diverticulosis of the descending and sigmoid colon without evidence   of acute diverticulitis. No evidence of mechanical bowel obstruction..    3. Moderate two column fracture at the L3 level with a epidural mass   along the posterior margin of the vertebral body. This vertebral body is   rather permeative in appearance and I suspect this represents a   pathologic fracture related to metastatic disease with epidural   extension.           This report was finalized on 12/08/2018 16:54 by Dr. Tj Paredes MD.      CT Chest With Contrast   Final Result   1. Large mass lesion within the left lower lobe. This is contiguous with   the left inferior hilar structures with associated left perihilar   lymphadenopathy. There is also evidence of metastatic disease to the   middle mediastinum at the level of the more distal esophagus. The   margins of the esophagus are difficult to delineate at this level and I   suspect there is secondary involvement of the esophagus. There is also   an enlarged prevascular node as well as a paraspinal cristi mass at the   level of the left lung base. Bilateral adrenal metastases are present.   There is periportal and interaortocaval adenopathy within the upper   abdomen. Satellite lesions are noted within the left lower lobe. There   is nodular thickening of the major fissure on the left as well   consistent with pleural studding..   2. Coronary calcifications as described above.   3. There is some effacement of the left main pulmonary artery along its   posterior aspect related to the hilar cristi component of the mass..           This report was finalized on 12/08/2018 16:41 by Dr. Tj Paredes MD.      CT Lumbar Spine Without Contrast   Final Result   Addendum 1 of 1   Addendum: With additional imaging of the spine with MR imaging I am   concerned that the adrenal lesions and  L3 fracture most likely represent   sequela of metastatic disease. The L3 vertebral body is somewhat   permeative in appearance which would also suggest the possibility of a   pathologic fracture related to metastatic disease. I suspect the ventral   mass within the spinal canal is related to neoplastic extension into the   spinal canal. There is a suspected lesion within the left lower lobe on   the upper most cut of today's exam and I suspect the constellation of   findings is related to a primary lung carcinoma with metastatic disease   to the adrenal glands and the L3 vertebral body. I spoke with Rin Ryan in the emergency room at 7:30 AM concerning the findings and   concern for metastatic disease resulting in pathologic fracture at L3.   This report was finalized on 12/08/2018 07:32 by Dr. Tj Paredes MD.      Final   Impression:    1. Acute 2 column fracture involving anterior middle columns of the L3   vertebral body with involvement of the right pedicle. There is no   evidence of retropulsion of the posterior aspect of the vertebral body   but there is evidence of thecal sac compression secondary to what I   suspect is an epidural hematoma along the ventral aspect of the thecal   sac. This measures approximately 1.4 x 1.0 cm in size. Follow-up imaging   with MRI is recommended if not contraindicated in this patient.   2. Large right suprarenal mass most likely related to the adrenal gland   but incompletely imaged. Adrenal cortical carcinoma is not excluded   given the size of this lesion. Given the history of trauma and adrenal   hematoma would also be in the differential but considered less likely.   Metastatic disease should also be considered. Some fullness within the   left suprarenal space is also suspicious for a left adrenal lesion   although incompletely imaged..           This report was finalized on 12/08/2018 07:07 by Dr. Tj Paredes MD.      MRI Lumbar Spine With & Without  Contrast   Final Result   1. Acute 2 column fracture involving the L3 vertebral body involving the   anterior and middle column and right pedicle with associated edema and   paraspinal soft tissue consistent with either hematoma or neoplasia.   There is no retropulsion of the posterior aspect of the vertebral body   but there is a moderate size epidural mass within the ventral spinal   canal resulting in effacement of the thecal sac and moderate to severe   central spinal stenosis. This is also contributing to neural foraminal   narrowing bilaterally at the L3-L4 disc level related to a combination   of bulging disc and facet overgrowth and the epidural mass. Given the   findings within the adrenals and the enhancement of the epidural mass   I'm concerned this represents a pathologic fracture related to   metastatic disease with associated extension into the ventral epidural   space. The patient does not give a history of any recent trauma making a   posttraumatic fracture at L3 less likely. No other discrete spinal   lesions are demonstrated. I would suggest follow-up with a CT of the   chest, abdomen and pelvis to further evaluate for potential metastatic   disease, particularly if the patient is a smoker. The adrenal lesions   are worrisome for metastatic disease.   2. Central and foraminal stenosis at other levels related to bulging the   disc as well as facet and ligamentous hypertrophy..   3. Bilateral adrenal masses. Follow-up CT imaging is recommended.   Metastatic disease is not excluded.           This report was finalized on 12/08/2018 07:28 by Dr. Tj Paredes MD.      XR Hip With or Without Pelvis 2 - 3 View Right   ED Interpretation   No fracture      Final Result   . Normal exam of the pelvis and right hip.   This report was finalized on 12/08/2018 06:57 by Dr. Tj Paredes MD.      NM Bone Scan Whole Body    (Results Pending)     Labs Reviewed   BASIC METABOLIC PANEL - Abnormal; Notable  for the following components:       Result Value    Glucose 101 (*)     BUN/Creatinine Ratio 28.6 (*)     All other components within normal limits    Narrative:     GFR Normal >60  Chronic Kidney Disease <60  Kidney Failure <15   CBC WITH AUTO DIFFERENTIAL - Abnormal; Notable for the following components:    WBC 13.25 (*)     Hematocrit 35.7 (*)     Platelets 444 (*)     Neutrophil % 86.4 (*)     Lymphocyte % 7.8 (*)     Neutrophils, Absolute 11.45 (*)     Immature Grans, Absolute 0.06 (*)     All other components within normal limits   APTT - Normal   PROTIME-INR - Normal   APTT - Normal   PROTIME-INR - Normal   POCT GLUCOSE FINGERSTICK - Normal   URINALYSIS W/ CULTURE IF INDICATED   POCT GLUCOSE FINGERSTICK   TYPE AND SCREEN   CBC AND DIFFERENTIAL    Narrative:     The following orders were created for panel order CBC & Differential.  Procedure                               Abnormality         Status                     ---------                               -----------         ------                     CBC Auto Differential[[id number]]        Abnormal            Final result                 Please view results for these tests on the individual orders.                 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

## 2018-12-08 NOTE — ED NOTES
Patient states she has sciatica and has had pain starting in her right buttock and radiating down her to her right knee x2 weeks.     Gopi Molina RN  12/08/18 6874

## 2018-12-09 ENCOUNTER — PREP FOR SURGERY (OUTPATIENT)
Dept: OTHER | Facility: HOSPITAL | Age: 68
End: 2018-12-09

## 2018-12-09 DIAGNOSIS — C80.1 MALIGNANT NEOPLASM METASTATIC TO LUMBAR SPINE WITH UNKNOWN PRIMARY SITE (HCC): Primary | ICD-10-CM

## 2018-12-09 DIAGNOSIS — C79.51 MALIGNANT NEOPLASM METASTATIC TO LUMBAR SPINE WITH UNKNOWN PRIMARY SITE (HCC): Primary | ICD-10-CM

## 2018-12-09 PROCEDURE — 99232 SBSQ HOSP IP/OBS MODERATE 35: CPT | Performed by: OBSTETRICS & GYNECOLOGY

## 2018-12-09 PROCEDURE — 94760 N-INVAS EAR/PLS OXIMETRY 1: CPT

## 2018-12-09 PROCEDURE — 25010000002 ONDANSETRON PER 1 MG: Performed by: NEUROLOGICAL SURGERY

## 2018-12-09 PROCEDURE — 99231 SBSQ HOSP IP/OBS SF/LOW 25: CPT | Performed by: NEUROLOGICAL SURGERY

## 2018-12-09 PROCEDURE — 94799 UNLISTED PULMONARY SVC/PX: CPT

## 2018-12-09 PROCEDURE — 63710000001 DEXAMETHASONE PER 0.25 MG: Performed by: NEUROLOGICAL SURGERY

## 2018-12-09 PROCEDURE — 25010000002 MORPHINE PER 10 MG: Performed by: NEUROLOGICAL SURGERY

## 2018-12-09 RX ORDER — NALOXONE HCL 0.4 MG/ML
0.4 VIAL (ML) INJECTION
Status: DISCONTINUED | OUTPATIENT
Start: 2018-12-09 | End: 2018-12-10 | Stop reason: SDUPTHER

## 2018-12-09 RX ORDER — BUPIVACAINE HCL/0.9 % NACL/PF 0.1 %
2 PLASTIC BAG, INJECTION (ML) EPIDURAL ONCE
Status: DISCONTINUED | OUTPATIENT
Start: 2018-12-09 | End: 2018-12-09

## 2018-12-09 RX ORDER — ALPRAZOLAM 0.5 MG/1
0.5 TABLET ORAL EVERY 8 HOURS PRN
Status: DISCONTINUED | OUTPATIENT
Start: 2018-12-09 | End: 2018-12-13 | Stop reason: HOSPADM

## 2018-12-09 RX ORDER — OXYCODONE AND ACETAMINOPHEN 7.5; 325 MG/1; MG/1
2 TABLET ORAL EVERY 4 HOURS PRN
Status: DISCONTINUED | OUTPATIENT
Start: 2018-12-09 | End: 2018-12-10 | Stop reason: SDUPTHER

## 2018-12-09 RX ORDER — BUPIVACAINE HCL/0.9 % NACL/PF 0.1 %
2 PLASTIC BAG, INJECTION (ML) EPIDURAL ONCE
Status: DISCONTINUED | OUTPATIENT
Start: 2018-12-10 | End: 2018-12-09

## 2018-12-09 RX ORDER — ONDANSETRON 2 MG/ML
4 INJECTION INTRAMUSCULAR; INTRAVENOUS EVERY 6 HOURS PRN
Status: DISCONTINUED | OUTPATIENT
Start: 2018-12-09 | End: 2018-12-10 | Stop reason: SDUPTHER

## 2018-12-09 RX ORDER — BUPIVACAINE HCL/0.9 % NACL/PF 0.1 %
2 PLASTIC BAG, INJECTION (ML) EPIDURAL ONCE
Status: CANCELLED | OUTPATIENT
Start: 2018-12-09 | End: 2018-12-09

## 2018-12-09 RX ADMIN — DEXAMETHASONE 4 MG: 4 TABLET ORAL at 21:06

## 2018-12-09 RX ADMIN — OXYCODONE HYDROCHLORIDE AND ACETAMINOPHEN 2 TABLET: 7.5; 325 TABLET ORAL at 09:48

## 2018-12-09 RX ADMIN — LIDOCAINE 1 PATCH: 50 PATCH CUTANEOUS at 09:01

## 2018-12-09 RX ADMIN — SODIUM CHLORIDE 75 ML/HR: 9 INJECTION, SOLUTION INTRAVENOUS at 10:43

## 2018-12-09 RX ADMIN — OXYCODONE HYDROCHLORIDE AND ACETAMINOPHEN 1 TABLET: 7.5; 325 TABLET ORAL at 06:06

## 2018-12-09 RX ADMIN — ALPRAZOLAM 0.5 MG: 0.5 TABLET ORAL at 12:19

## 2018-12-09 RX ADMIN — MORPHINE SULFATE 4 MG: 4 INJECTION INTRAVENOUS at 17:37

## 2018-12-09 RX ADMIN — MORPHINE SULFATE 2 MG: 2 INJECTION, SOLUTION INTRAMUSCULAR; INTRAVENOUS at 07:34

## 2018-12-09 RX ADMIN — ALPRAZOLAM 0.5 MG: 0.5 TABLET ORAL at 21:11

## 2018-12-09 RX ADMIN — OXYCODONE HYDROCHLORIDE AND ACETAMINOPHEN 2 TABLET: 7.5; 325 TABLET ORAL at 14:07

## 2018-12-09 RX ADMIN — MORPHINE SULFATE 2 MG: 2 INJECTION, SOLUTION INTRAMUSCULAR; INTRAVENOUS at 00:24

## 2018-12-09 RX ADMIN — ONDANSETRON 4 MG: 2 INJECTION INTRAMUSCULAR; INTRAVENOUS at 12:19

## 2018-12-09 RX ADMIN — MORPHINE SULFATE 4 MG: 4 INJECTION INTRAVENOUS at 23:19

## 2018-12-09 RX ADMIN — DEXAMETHASONE 4 MG: 4 TABLET ORAL at 09:01

## 2018-12-09 RX ADMIN — MORPHINE SULFATE 4 MG: 4 INJECTION INTRAVENOUS at 11:12

## 2018-12-09 RX ADMIN — OXYCODONE HYDROCHLORIDE AND ACETAMINOPHEN 2 TABLET: 7.5; 325 TABLET ORAL at 19:44

## 2018-12-09 RX ADMIN — MORPHINE SULFATE 4 MG: 4 INJECTION INTRAVENOUS at 09:01

## 2018-12-09 RX ADMIN — MORPHINE SULFATE 2 MG: 2 INJECTION, SOLUTION INTRAMUSCULAR; INTRAVENOUS at 04:53

## 2018-12-09 NOTE — PROGRESS NOTES
"   LOS: 1 day   Patient Care Team:  Provider, No Known as PCP - General    Chief Complaint: Vulvar lesion    Subjective     Patient chart is reviewed.  Updated noted.        Subjective:  Symptoms:  Stable.    Diet:  Poor intake.    Activity level: Impaired due to weakness.    Pain:  She complains of pain that is moderate.  She reports pain is unchanged.  Pain is partially controlled.        History taken from: patient    Objective     Vital Signs  Temp:  [97.9 °F (36.6 °C)-98.6 °F (37 °C)] 97.9 °F (36.6 °C)  Heart Rate:  [] 99  Resp:  [14-18] 18  BP: (113-162)/(78-91) 154/78    Objective:  General Appearance:  Comfortable.    Vital signs: (most recent): Blood pressure 154/78, pulse 99, temperature 97.9 °F (36.6 °C), temperature source Oral, resp. rate 18, height 152.4 cm (60\"), weight 62.3 kg (137 lb 6.4 oz), SpO2 95 %.  Vital signs are normal.    Output: Producing urine.    Lungs:  Normal effort and normal respiratory rate.    Heart: Normal rate.  Regular rhythm.    Abdomen: Abdomen is soft.  Bowel sounds are normal.   There is no abdominal tenderness.     Neurological: Patient is alert and oriented to person, place and time.    Skin:  Warm and dry.              Results Review:     I reviewed the patient's new clinical results.    Medication Review: Completed    Assessment/Plan       Compression fracture of L3 lumbar vertebra (CMS/HCC)  Vulvar lesion    Assessment:    Condition: In stable condition.  Unchanged.         Plans to go to the OR tomorrow for vulvar and vaginal biopsies.  Patient consented.  She verbalizes understanding and agreement.    Alonzo Lugo MD  12/09/18  9:59 AM    Time: More than 50% of time spent in counseling and coordination of care:  Total face-to-face/floor time 30 min.  Time spent in counseling 20 min. Counseling included the following topics: Surgical counseling including the risks, benefits, and alternatives discussed.  Risk of bleeding and infection discussed.  Need for " tissue diagnosis discussed.

## 2018-12-09 NOTE — CONSULTS
[facility]  Kasey Rios  : 1950  MRN: [id number]  [facility]: [id number]    History and Physical    Subjective   Kasey Rios is a 68 y.o. year old  who was admitted due to back pain and difficulty walking by neurosurgery.  GYN was consulted by Erwin Benitez MD for help with vaginal irritation and pain.  She noted the vaginal irritation and pain to begin approximately 1 month ago.  She says that since her back pain and her difficulty in mobilization, she has been having difficulty with getting to the bathroom.  As a result, she is experiencing urinary incontinence.  She started using pads that were frequently wet.  She has been using hemorrhoid cream without relief.  She denies any vaginal bleeding.  She reports 3 previous vaginal deliveries at term.  She reports a previous abdominal hysterectomy with bilateral salpingo-oophorectomy for benign disease but this was done several decades ago.  No records are available for review.  She has not had Pap smears in quite some time, however, she reports normal Pap smears prior to her hysterectomy.  Again, it sounds as though she had a hysterectomy for abnormal bleeding and benign processes.  Her surgical history is also significant for a previous ectopic pregnancy which require surgical intervention.  She has not been sexually active for more than 20 years.    Admitting H&P is reviewed.  Labs and images are reviewed.  CT scan of the pelvis does not show any acute abnormality.  However, there are findings on her imaging concerning for possible primary lung malignancy with possible metastatic disease.      Past Medical History:   Diagnosis Date   • Hypertension        Past Surgical History:   Procedure Laterality Date   • APPENDECTOMY     • ECTOPIC PREGNANCY      Ruptured ectopic   • TOTAL ABDOMINAL HYSTERECTOMY WITH SALPINGO OOPHORECTOMY      DUB, Fibroids, benign       Social History     Socioeconomic History   • Marital status: Single     Spouse  name: Not on file   • Number of children: Not on file   • Years of education: Not on file   • Highest education level: Not on file   Social Needs   • Financial resource strain: Not on file   • Food insecurity - worry: Not on file   • Food insecurity - inability: Not on file   • Transportation needs - medical: Not on file   • Transportation needs - non-medical: Not on file   Occupational History   • Not on file   Tobacco Use   • Smoking status: Current Every Day Smoker     Types: Cigarettes   Substance and Sexual Activity   • Alcohol use: No     Frequency: Never   • Drug use: No   • Sexual activity: Not on file   Other Topics Concern   • Not on file   Social History Narrative   • Not on file       History reviewed. No pertinent family history.      Current Facility-Administered Medications:   •  acetaminophen (TYLENOL) suppository 650 mg, 650 mg, Rectal, Q4H PRN, Erwin Benitez MD  •  bisacodyl (DULCOLAX) suppository 10 mg, 10 mg, Rectal, Daily PRN, Erwin Benitez MD  •  dexamethasone (DECADRON) tablet 4 mg, 4 mg, Oral, Q12H, Erwin Benitez MD  •  lidocaine (LIDODERM) 5 % 1 patch, 1 patch, Transdermal, Q24H, Ant Castano MD, 1 patch at 12/08/18 0403  •  Morphine sulfate (PF) injection 2 mg, 2 mg, Intravenous, Q2H PRN, 2 mg at 12/08/18 1428 **AND** naloxone (NARCAN) injection 0.4 mg, 0.4 mg, Intravenous, Q5 Min PRN, Erwin Benitez MD  •  oxyCODONE-acetaminophen (PERCOCET) 7.5-325 MG per tablet 1 tablet, 1 tablet, Oral, Q4H PRN, Erwin Benitez MD, 1 tablet at 12/08/18 1728  •  sennosides-docusate sodium (SENOKOT-S) 8.6-50 MG tablet 2 tablet, 2 tablet, Oral, BID PRN, Erwin Benitez MD  •  sodium chloride 0.9 % flush 3 mL, 3 mL, Intravenous, Q12H, Erwin Benitez MD  •  sodium chloride 0.9 % flush 3-10 mL, 3-10 mL, Intravenous, PRN, Erwin Benitez MD  •  sodium chloride 0.9 % infusion, 75 mL/hr, Intravenous, Continuous, Erwin Benitez MD    No Known Allergies    Obstetric History    G4   " P3   T3      L3     SAB0   TAB0   Ectopic1   Molar0   Multiple0   Live Births3        Review of Systems   Constitutional: Positive for activity change. Negative for appetite change and unexpected weight change.   Respiratory: Positive for shortness of breath. Negative for chest tightness.    Cardiovascular: Negative for chest pain and leg swelling.   Gastrointestinal: Negative for abdominal pain and anal bleeding.   Endocrine: Negative for cold intolerance and heat intolerance.   Genitourinary: Positive for difficulty urinating, genital sores, pelvic pain and vaginal pain. Negative for vaginal bleeding and vaginal discharge.   Neurological: Negative for headaches.   Hematological: Does not bruise/bleed easily.   Psychiatric/Behavioral: Negative for confusion and decreased concentration.           Objective     /81 (BP Location: Left arm, Patient Position: Lying)   Pulse 95   Temp 98 °F (36.7 °C) (Oral)   Resp 16   Ht 152.4 cm (60\")   Wt 62.3 kg (137 lb 6.4 oz)   SpO2 92%   BMI 26.83 kg/m²     Physical Exam   Physical Exam   Constitutional: She is oriented to person, place, and time. She appears well-developed and well-nourished. No distress.   HENT:   Head: Normocephalic and atraumatic.   Neck: Normal range of motion. Neck supple. No thyromegaly present.   Cardiovascular: Normal rate and regular rhythm.   Pulmonary/Chest: Effort normal and breath sounds normal.   Abdominal: Soft. Bowel sounds are normal. Hernia confirmed negative in the right inguinal area and confirmed negative in the left inguinal area.   Genitourinary:       Pelvic exam was performed with patient supine. No labial fusion. There is tenderness and lesion on the right labia. There is no tenderness or lesion on the left labia.       Lymphadenopathy:     She has no cervical adenopathy. No inguinal adenopathy noted on the right or left side.   Neurological: She is alert and oriented to person, place, and time.   Skin: Skin is warm " and dry. She is not diaphoretic.   Psychiatric: She has a normal mood and affect. Her behavior is normal. Judgment and thought content normal.   Nursing note and vitals reviewed.      Labs  Lab Results   Component Value Date     (H) 12/08/2018    HGB 12.1 12/08/2018    HCT 35.7 (L) 12/08/2018    WBC 13.25 (H) 12/08/2018     12/08/2018    K 4.1 12/08/2018    CL 99 12/08/2018    CO2 28.0 12/08/2018    BUN 18 12/08/2018    CREATININE 0.63 12/08/2018    GLUCOSE 101 (H) 12/08/2018    CALCIUM 8.5 12/08/2018       Radiology Studies  Imaging Results (last 72 hours)     Procedure Component Value Units Date/Time    CT Abdomen Pelvis With Contrast [[id number]] Collected:  12/08/18 1643     Updated:  12/08/18 1657    Narrative:       CT ABDOMEN PELVIS W CONTRAST- 12/8/2018 3:58 PM CST     HISTORY: Neoplasm: abdomen, other primary, suspected;  M84.58XA-Pathological fracture in neoplastic disease, other specified  site, initial encounter for fracture; C80.1-Malignant (primary)  neoplasm, unspecified       COMPARISON: None.      DLP: 530 mGy cm. Automated exposure control was utilized to diminish  patient radiation dose.     TECHNIQUE: Following the oral ingestion and intravenous administration  of contrast, helical CT tomographic images of the abdomen and pelvis  were acquired. Coronal reformatted images were also provided for review.        FINDINGS:   There is a large lobular neoplastic mass within the left lower lobe.  There is associated studding of the pleura and major fissure on the  left. A paraspinal mass is also present at the level of the lesion and  the lesion is contiguous with the left infrahilar structures with left  hilar and infrahilar lymphadenopathy. There is also a middle mediastinal  cristi mass inseparable from the distal esophagus and I'm concerned that  there is most likely involvement with the esophagus from this component  of the neoplasm. The right lung bases clear. There is no  pleural  effusion..      LIVER: No focal liver lesion. The hepatic vasculature is patent.      BILIARY SYSTEM: The gallbladder is unremarkable. No intrahepatic or  extrahepatic ductal dilatation.      PANCREAS: No focal pancreatic lesion.      SPLEEN: Unremarkable.      KIDNEYS AND ADRENALS: Bilateral necrotic adrenal metastases are present.  These were further discussed in the CT of the chest report. Both of  these measure more than 6 cm in size set. There is an ill-defined  hypodensity associated with the posterior interpolar aspect of the left  kidney measuring approximately 1.6 cm in size. I suspect that this may  also represent a metastasis to the left kidney. The kidneys otherwise  demonstrate normal enhancement.. The ureters are decompressed and normal  in appearance.     RETROPERITONEUM: There is ectasia of the infrarenal abdominal aorta. An  enlarged necrotic interaortocaval node is present measuring 2.2 x 2.1 cm  in size. A 10 mm short axis left paraortic node is also present..      GI TRACT: Diverticular disease is noted of the descending and sigmoid  colon without evidence of acute diverticulitis. There is no evidence of  mechanical bowel obstruction.. The appendix is surgically absent..     OTHER: No evidence of a mesenteric mass. Some enlarged periportal nodes  are present including a 12 mm short axis and 13 mm short axis node just  above the main portal vein in the landon hepatis. Some smaller nodes are  noted within the gastrohepatic ligament as well.. The abdominopelvic  vasculature is patent. There is a moderate compression fracture which is  a 2 column fracture involving the L3 vertebral body. Along the posterior  margin of this fracture is a epidural soft tissue mass. The bone is  rather permeative in appearance and I suspect this represents a  pathologic fracture related to bony metastases. A rind of soft tissue  density is also noted adjacent to the vertebral body. No other discrete  lesions are  demonstrated.. No free fluid or localized inflammation is  present.      PELVIS: No mass lesion, fluid collection or significant lymphadenopathy  is seen in the pelvis. The patient's undergone prior hysterectomy. The  urinary bladder is unremarkable..       Impression:       1. Dominant mass within the left lower lobe of the lung. There is  associated hilar and middle mediastinal adenopathy. A left paraspinal  cristi mass is also present as well as a satellite lesion and pleural  studding.. Also noted is an enlarged interaortocaval node and some  enlarged periportal nodes. There is a ill-defined hypodense lesion  involving the posterior interpolar aspect of the left kidney suspicious  for metastatic disease. No evidence of hepatic lesions. Marked  enlargement and heterogeneity is noted of both adrenal glands consistent  with large adrenal metastases. Given the appearance of the lesions and  distribution I suspect this may represent a small cell lung carcinoma. A  borderline enlarged left para-aortic node is also present.  2. Diverticulosis of the descending and sigmoid colon without evidence  of acute diverticulitis. No evidence of mechanical bowel obstruction..   3. Moderate two column fracture at the L3 level with a epidural mass  along the posterior margin of the vertebral body. This vertebral body is  rather permeative in appearance and I suspect this represents a  pathologic fracture related to metastatic disease with epidural  extension.        This report was finalized on 12/08/2018 16:54 by Dr. Tj Paredes MD.    CT Chest With Contrast [[id number]] Collected:  12/08/18 1630     Updated:  12/08/18 1644    Narrative:       CT CHEST W CONTRAST- 12/8/2018 3:58 PM CST     HISTORY: Neoplasm: chest, heme/lymphatic, recurrence, suspected/known;  M84.58XA-Pathological fracture in neoplastic disease, other specified  site, initial encounter for fracture; C80.1-Malignant (primary)  neoplasm, unspecified       COMPARISON: None      DLP: 340 mGy cm. Automated exposure control was utilized to diminish  patient radiation dose.     TECHNIQUE: Serial helical tomographic images of the chest were acquired  following the infusion of Isovue contrast intravenously. Bone and soft  tissue algorithms were provided.       FINDINGS:   Neck base: The imaged portion of the neck and thyroid gland is  unremarkable.      Lungs: There are moderate changes of centrilobular emphysema. There is a  heterogeneously enhancing 5.6 x 5.7 cm lobular mass within the left  lower lobe. A peripheral satellite lesion also noted within left lower  lobe measures 1.3 cm in size. There is irregular nodular thickening of  the major fissure on the left. There is some postobstructive atelectasis  within the left lower lobe. A pleural-based paraspinal mass is noted  within the medial left lung base measuring 2.2 x 1.5 cm in size. This  lower lobe mass is contiguous along its upper aspect with left hilar and  infrahilar lymphadenopathy. It is also contiguous with a mass posterior  to the left atrium which is inseparable from the esophagus. I suspect  there is most likely some involvement of the distal esophagus with the  neoplasm. The mass at this level measures approximately 3.9 x 2.5 cm in  size. There is a granuloma within the right upper lobe. No discrete  right-sided lung lesion is present.. No pleural effusion is present. The  left lower lobe is truncated and extends directly into the left lower  lobe mass lesion. The tracheobronchial tree are otherwise patent..      Heart: There is mild cardiomegaly. Coronary artery calcifications noted  in the right coronary, LAD and circumflex distributions. No evidence of  pericardial effusion..    .      Vasculature: Aorta is normal in caliber and appearance without  significant atherosclerosis, stenosis, or aneurysm. A prevascular node  is present adjacent to the proximal aortic arch. This measures  approximately 4.9  x 2.9 cm in size. There is atheromatous calcification  with mild stenosis at the origin left common carotid artery left  subclavian artery. The proximal great vessels are otherwise  unremarkable. There is effacement of the distal left main pulmonary  artery along its posterior margin related to the hilar component of the  patient's neoplasm.     Lymph nodes: No additional enlarged axillary or mediastinal nodes are  present..      Bones and soft tissues: No acute osseous or soft tissue abnormality is  seen. There are no worrisome osseous lesions.      Upper abdomen: Bilateral adrenal metastases are present. The left  measures 5.5 x 6.1 and the right measures 6.7 x 5.4 cm in size. A small  hiatal hernia is present. An enlarged interaortocaval node is present  within the upper retroperitoneum measuring 1.9 x 2.3 cm in size..        Impression:       1. Large mass lesion within the left lower lobe. This is contiguous with  the left inferior hilar structures with associated left perihilar  lymphadenopathy. There is also evidence of metastatic disease to the  middle mediastinum at the level of the more distal esophagus. The  margins of the esophagus are difficult to delineate at this level and I  suspect there is secondary involvement of the esophagus. There is also  an enlarged prevascular node as well as a paraspinal cristi mass at the  level of the left lung base. Bilateral adrenal metastases are present.  There is periportal and interaortocaval adenopathy within the upper  abdomen. Satellite lesions are noted within the left lower lobe. There  is nodular thickening of the major fissure on the left as well  consistent with pleural studding..  2. Coronary calcifications as described above.  3. There is some effacement of the left main pulmonary artery along its  posterior aspect related to the hilar cristi component of the mass..        This report was finalized on 12/08/2018 16:41 by Dr. Tj Paredes MD.    CT  Lumbar Spine Without Contrast [[id number]] Collected:  12/08/18 0658     Updated:  12/08/18 0735    Addenda:        Addendum: With additional imaging of the spine with MR imaging I am  concerned that the adrenal lesions and L3 fracture most likely represent  sequela of metastatic disease. The L3 vertebral body is somewhat  permeative in appearance which would also suggest the possibility of a  pathologic fracture related to metastatic disease. I suspect the ventral  mass within the spinal canal is related to neoplastic extension into the  spinal canal. There is a suspected lesion within the left lower lobe on  the upper most cut of today's exam and I suspect the constellation of  findings is related to a primary lung carcinoma with metastatic disease  to the adrenal glands and the L3 vertebral body. I spoke with Rin Ryan in the emergency room at 7:30 AM concerning the findings and  concern for metastatic disease resulting in pathologic fracture at L3.  This report was finalized on 12/08/2018 07:32 by Dr. Tj Paredes MD.  Signed:  12/08/18 0732 by Tj Paredes MD    Narrative:       CT LUMBAR SPINE WO CONTRAST- 12/8/2018 3:54 AM CST     History: low back pain     Comparison: None      DLP: 675 mGy cm. Automated exposure control was utilized to diminish  patient radiation dose.     Technique: Serial helical tomographic images of the lumbar spine were  obtained without the use of intravenous contrast. Additionally, sagittal  and coronal reformatted images were provided for review.      Findings:   Alignment: Normal.     Bones: There is an acute 2 column fracture involving the anterior and  middle column of the L3 vertebral body. There is involvement of the  right L3 pedicle. There is no evidence of retropulsion. Centrally there  is an 80% loss of height. Vertebral body heights are otherwise  maintained.     Disc spaces: Maintained.     Canal and neuroforamina: There is no retropulsion at the L3  fracture  there is a 1.4 x 1.0 cm slightly hyperdense extradural mass with  associated effacement of the thecal sac. I feel this likely represents  an epidural hematoma follow-up with MRI would be recommended if not  contraindicated in this patient. Broad-based bulging of disc as well as  facet and ligamentous hypertrophy at the L3-L4, L4-L5 and L5-S1 level  contributes to central and foraminal stenosis.     Soft tissues: A 6.4 cm right super renal mass is demonstrated measuring  Hounsfield units of 34 suggesting it to be solid in nature. This would  also warrant follow-up. Also suspect a left adrenal lesion although not  as well-visualized. Metastatic disease is not entirely excluded.       Impression:       Impression:   1. Acute 2 column fracture involving anterior middle columns of the L3  vertebral body with involvement of the right pedicle. There is no  evidence of retropulsion of the posterior aspect of the vertebral body  but there is evidence of thecal sac compression secondary to what I  suspect is an epidural hematoma along the ventral aspect of the thecal  sac. This measures approximately 1.4 x 1.0 cm in size. Follow-up imaging  with MRI is recommended if not contraindicated in this patient.  2. Large right suprarenal mass most likely related to the adrenal gland  but incompletely imaged. Adrenal cortical carcinoma is not excluded  given the size of this lesion. Given the history of trauma and adrenal  hematoma would also be in the differential but considered less likely.  Metastatic disease should also be considered. Some fullness within the  left suprarenal space is also suspicious for a left adrenal lesion  although incompletely imaged..        This report was finalized on 12/08/2018 07:07 by Dr. Tj Paredes MD.    MRI Lumbar Spine With & Without Contrast [[id number]] Collected:  12/08/18 0707     Updated:  12/08/18 0732    Narrative:       MRI LUMBAR SPINE W WO CONTRAST- 12/8/2018 6:05 AM CST      HISTORY: rule out epidural hematoma     COMPARISON: CT exam of earlier the same day.      TECHNIQUE: Multiplanar, multisequence MRI of the lumbar spine was  performed with and without the use of contrast.     FINDINGS:      Alignment: There are presumed to be 5 lumbar-type vertebrae with the  most inferior being labeled L5. Normal lumbar lordosis is present. There  is a 2 column fracture involving the anterior and middle columns of the  L3 vertebral body with an approximate 80% loss of height centrally.  There is no evidence of retropulsion of the vertebral body. There is  diffuse edema within the vertebral body as would be anticipated.. As  suspected by CT examination there is a ventral mass along the posterior  margin of the L3 vertebral body measuring approximately 2.2 cm in  transverse dimension by 1.1 cm in anterior to posterior dimension. This  is resulting in effacement of the thecal sac with moderate central  spinal stenosis. The anterior posterior dimension of the canal is  narrowed to 6 mm at this level.     Marrow signal: There is diffuse abnormal signal within the L3 vertebral  body. There is otherwise normal marrow signal throughout the lumbar  spine. Degenerative disc disease is noted lower 2 lumbar disc levels  with mild disc desiccation..      Cord/Canal: The conus medullaris terminates at the level of L1. The  visualized spinal cord is normal in signal and morphology.      Soft tissues: Paraspinal soft tissue is noted related at the level of  the fractured L3 representing mild paraspinal hematoma versus tumor..  Bilateral adrenal masses are present each of which measure over 6 cm in  size. Given the size of the lesions adenomas are considered unlikely.  Follow-up with CT imaging of the chest, abdomen and pelvis is  recommended. Metastatic disease is not excluded.     Levels:        L1-L2: No disc bulge is present. There is moderate facet and mild  ligamentous hypertrophy. No evidence of central or  foraminal stenosis..      L2-L3: There is mild bulging of disc as well as moderate facet and  ligamentous hypertrophy with mild ventral and posterolateral effacement  of the thecal sac. There is no central spinal stenosis. Bulging of the  disc and facet overgrowth does contribute to mild bilateral neural  foraminal narrowing..      L3-L4: Above the L3-L4 disc level there is the previously discussed  epidural mass which is resulting in effacement of the thecal sac with  moderate to severe central spinal stenosis. At the L3-L4 disc level  there is broad-based bulging disc as well as moderate facet and  ligamentous hypertrophy also contributing to mild central spinal  stenosis. There is severe bilateral neural foraminal narrowing related  to bulging disc and facet overgrowth along the under margin of the  foramen and the epidural hematoma along the upper margin of both  foramen..      L4-L5: There is broad-based bulging of the disc with an annular fissure  along the posterior central annulus. There is moderate facet and  ligamentous hypertrophy with moderate central spinal stenosis. Facet and  ligamentous hypertrophy and bulging of the disc results in severe  bilateral neural foraminal narrowing..      L5-S1: There is bulging of disc with a small central disc protrusion.  There is moderate facet and ligamentous hypertrophy. No evidence of  central spinal stenosis. Severe right-sided and moderate left-sided  foraminal narrowing is present..      Postcontrast: There is diffuse enhancement associated with the L3  vertebral body. There is also enhancement associated with the epidural  mass. Although some enhancement could be anticipated associated with a  fracture the enhancement of the epidural mass I feel is concerning for a  pathologic fracture with epidural extension of neoplasia. Bilateral  bilateral adrenal mass lesions demonstrating peripheral enhancement with  a question with some central necrosis is also  suggestive of metastatic  disease.. The right lesion measures 6.1 cm and the left adrenal lesion  6.2 cm in size. Adrenal metastasis should be considered in the  differential. Follow-up imaging of the chest, abdomen and pelvis would  be suggested, particularly if the patient is a smoker.       Impression:       1. Acute 2 column fracture involving the L3 vertebral body involving the  anterior and middle column and right pedicle with associated edema and  paraspinal soft tissue consistent with either hematoma or neoplasia.  There is no retropulsion of the posterior aspect of the vertebral body  but there is a moderate size epidural mass within the ventral spinal  canal resulting in effacement of the thecal sac and moderate to severe  central spinal stenosis. This is also contributing to neural foraminal  narrowing bilaterally at the L3-L4 disc level related to a combination  of bulging disc and facet overgrowth and the epidural mass. Given the  findings within the adrenals and the enhancement of the epidural mass  I'm concerned this represents a pathologic fracture related to  metastatic disease with associated extension into the ventral epidural  space. The patient does not give a history of any recent trauma making a  posttraumatic fracture at L3 less likely. No other discrete spinal  lesions are demonstrated. I would suggest follow-up with a CT of the  chest, abdomen and pelvis to further evaluate for potential metastatic  disease, particularly if the patient is a smoker. The adrenal lesions  are worrisome for metastatic disease.  2. Central and foraminal stenosis at other levels related to bulging the  disc as well as facet and ligamentous hypertrophy..  3. Bilateral adrenal masses. Follow-up CT imaging is recommended.  Metastatic disease is not excluded.        This report was finalized on 12/08/2018 07:28 by Dr. Tj Paredes MD.    XR Hip With or Without Pelvis 2 - 3 View Right [[id number]] Collected:   12/08/18 0656     Updated:  12/08/18 0700    Narrative:       EXAMINATION: AP pelvis and two-view right hip 12/8/2018     HISTORY: Right hip pain     FINDINGS: AP radiograph of the pelvis as well as two-view exam of the  right hip demonstrates no fracture or dislocation. The joint space is  well-preserved. The femoral head is concentric and well located within  the acetabulum. No evidence of AVN.       Impression:       . Normal exam of the pelvis and right hip.  This report was finalized on 12/08/2018 06:57 by Dr. Tj Paredes MD.             Assessment   1. Erosive labial lesion and vaginal mass concerning for malignancy.  2. Imaging concerning for other malignancy which could possibly be primary lung.     Plan   1. Ultimately, tissue biopsy of this area will be necessary for diagnostic purposes.  2. Surgical resection of this is not indicated due to other metastatic lesions at this time.  However, radiation therapy might be of benefit based on tissue diagnosis.  3. I discussed the above with Dr. Benitez.  Ideally, biopsies of the vulva and possibly vaginal mass could be performed at the same time no neurosurgical intervention.  At this time, will await final decisions from neurosurgery in regards to need for that.  If it appears that neurosurgical intervention will be indicated, we will proceed with gynecologic biopsies concomitantly.  This was discussed with the patient.    Alonzo Lugo MD  12/8/2018  7:48 PM

## 2018-12-09 NOTE — PLAN OF CARE
Problem: Patient Care Overview  Goal: Plan of Care Review  Outcome: Ongoing (interventions implemented as appropriate)  Continue to work up to find all areas of cancer.  Waiting on MRI to be done today.  Patient will undergo surgery per Dr. Benitez tomorrow for kyphoplasty and vaginal biopsy per Dr. Lugo   12/09/18 4175   Coping/Psychosocial   Plan of Care Reviewed With patient   Plan of Care Review   Progress no change       Problem: Pain, Acute (Adult)  Goal: Identify Related Risk Factors and Signs and Symptoms  Outcome: Ongoing (interventions implemented as appropriate)  Patient having a lot of pain to the right hip area today.  Morphine dose was increased with percocet also.  Pain more controlled with increase in dose.  Xanax added for patients anxiety after telling her family about her diagnosis   12/09/18 2018   Pain, Acute (Adult)   Related Risk Factors (Acute Pain) persistent pain;disease process;fear;positioning   Signs and Symptoms (Acute Pain) guarding/abnormal posturing/positioning;moaning

## 2018-12-09 NOTE — CONSULTS
"    PATIENT NAME: Kasey Rios  DATE: 12/09/2018  YOB: 1950  PATIENT #: [id number]    HOSPITAL CONSULT: [facility]      DATE OF ADMISSION: 12/08/2018  CONSULTATION DATE: 12/09/2018  FACILITY MR #: [id number]  REFERRING PHYSICIAN: Erwin Benitez MD    NOTES  REASON FOR CONSULTATION: Metastatic malignancy, likely lung primary.    HISTORY OF PRESENT ILLNESS: Ms. Kasey Rios is a 68-year-old female whose history includes long-standing tobacco smoking (up to 1/2 pack per day since age 18) and hypertension. She has not been followed by a primary care doctor and thus has not had any health maintenance. She has chronic back pain that over the last 2 months has gradually worsened to the point that she has developed right paraspinal back pain with excruciating right hip pain associated with lower extremity radicular pain down the posterior aspect of her right thigh and knee with associated \"numbness.\" She states that she was in the emergency room a week ago but left when the wait had gotten too long for her to bear. She states she was given steroids that were helpful for a brief period before the pain came roaring back. She returned to the emergency room yesterday when the pain again became unbearable and in the last 2 weeks has been barely able to walk due to pain.  She adds that in the last 2-3 weeks she has also developed bloody vaginal discharge.     Evaluation since admission included a urinalysis that showed 1+ blood, 3+ leukocyte esterase, 6-12 RBCs, 31-50 WBCs but no bacteria. Urine culture shows no growth at 24 hours. PT is 13.2, INR 0.97, PTT 30.1 (each within reference range). Hemoglobin 12.1, hematocrit 35.7, MCV 85, platelets 444,000, WBC 13.24 with 86.4 segs and 7.8 lymphocytes. BMP is notable for a glucose of 101 but is otherwise normal with a GFR of 94 and calcium of 8.5.     X-rays of the pelvis 12/08/2018. Impression: Normal exam of the pelvis and right hip.    CT of the lumbar " spine without contrast, 12/08/2018. Impression: Acute 2 column fracture involving the anterior middle columns of the L3 vertebral body with involvement of the right pedicle. No evidence of retropulsion of the posterior aspect of the vertebral body but evidence of thecal sac compression secondary to suspected epidural hematoma along the ventral aspect of the thecal sac. This measures approximately 1.4 x 1 cm in size. MRI recommended. Large right suprarenal mass most likely related to the adrenal gland but incompletely imaged. Adrenal cortical carcinoma not excluded given the size of the lesion. Hematoma of the adrenal also in the differential. Metastatic disease also considered. Addendum: Imaging of the spine with MRI (see below) concerning for metastatic disease with adrenal lesions and L3 fracture. L3 vertebral body is permeative in appearance suggesting pathologic fracture related to metastatic disease. Ventral mass within the spinal canal is suspicious for neoplastic extension into the spinal canal. Suspect the lesion within the left lower lobe on the upper most cut of today's exam suspicious for related primary lung carcinoma with metastatic disease to the adrenal glands and the L3 vertebral body.    MRI lumbar spine with and without contrast, 12/08/2018. Impression: Acute 2 column fracture involving the L3 vertebral body involving the anterior and middle column and right pedicle with associated edema and paraspinal soft tissue consistent with either hematoma or neoplasia. There is no retropulsion of the posterior aspect of the vertebral body but there is a moderate sized epidural mass within the ventral spinal canal resulting in effacement of the thecal sac and moderate to severe central spinal stenosis. This is also contributing to neural foraminal narrowing bilaterally at the L3-4 disk level related to a combination of bulging disk and facet overgrowth in the epidural mass. Findings concerning for pathologic  fracture related to metastatic disease with associated extension into the ventral epidural space. Central and foraminal stenosis at other levels related to bulging disks as well as facet and ligamentous hypertrophy. Bilateral adrenal masses.     CT abdomen and pelvis with contrast, 12/08/2018. Impression: Dominant mass within the left lower lobe of the lung. Associated hilar and middle mediastinal adenopathy. A less paraspinal cristi mass is also present as well as a satellite lesion and pleural studding. Also noted is an enlarged interaortocaval node and some enlarged periportal nodes. Ill-defined hypodense lesion involving the posterior interpolar aspect of the left kidney suspicious for metastatic disease. No evidence of hepatic lesions. Marked enlargement and heterogeneity is noted of both adrenal glands consistent with large adrenal metastasis. Small cell carcinoma suspected. Borderline enlarged left periaortic node also present. Diverticulosis of the descending and sigmoid colon without evidence of acute diverticulitis. No evidence of mechanical bowel obstruction. Moderate 2 column fracture at the L3 level with an epidural mass along the posterior margin of the vertebral body. Vertebral body is permeative in appearance suspicious for pathologic fracture related to metastatic disease with epidural extension.     CT chest with contrast, 12/08/2018. Impression: Large mass lesion within the left lower lobe (5.6 x 5.7 cm). This is contiguous with the left inferior hilar structures with associated left perihilar lymphadenopathy. There is also evidence of metastatic disease to the middle mediastinum at the level of the more distal esophagus. The margins of the esophagus are difficult to delineate at this level with suspicious secondary involvement of the esophagus. There is also an enlarged prevascular node as well as paraspinal mass at the level of the left lung base. Bilateral adrenal metastases present. There is  periportal and interaortocaval adenopathy within the upper abdomen. Satellite lesions are noted within the left lower lobe with nodular thickening of the major fissure on the left as well as consistent with pleural studding. Coronary calcifications. Some effacement of the left main pulmonary artery along its posterior aspect related to the hilar cristi component of the mass. The adrenal masses measure 5.5 x 6.1 cm on the left and 6.7 x 5.4 cm on the right.     The patient has been seen for GYN by Dr. Manas Lugo who has noted an erosive right labial lesion and vaginal mass concerning for malignancy.     The immediate plans by Dr. Benitez include taking her to the operating room tomorrow afternoon for L3 laminectomy, debulking of the spinal cord tumor, biopsy of the vertebral body, kyphoplasty, and radiofrequency treatment at that level. Dr. Lugo will perform a vaginal biopsy at the same time.     Today, we are seeing the patient regarding management considerations from a medical oncology standpoint.     PAST MEDICAL HISTORY:  ALLERGIES:   • No known medication allergies  HOME MEDICATIONS:  • No home medications prior to admission        HOSPITAL MEDICATIONS:  • Decadron 4 mg p.o. every 12 hours  • Dulcolax 10 mg as needed  • Lidoderm 5% patch every 24 hours  • Percocet 7.5 mg 2 tablets every 4 hours as needed  • Senokot S 2 tablets twice daily   • Tylenol suppository 650 mg every 4 hours  ADULT ILLNESSES:  • Chronic obstructive pulmonary disease ( COPD, presumed ; )  • Hypertension  SURGERIES:  • Excision of appendix, (appendectomy)  • Tubal abdominal ligation  • Hysterectomy  FAMILY HISTORY:  Maternal grandmother  from metastatic malignancy. Two maternal uncles  from lung cancer.     SOCIAL HISTORY:  Single. She has 3 children (1 daughter and 2 sons). A daughter and granddaughter live with her. She has smoked up to 1/2 pack per day since age 18. She does not use alcohol. She is retired from working  "as the [employer] .     REVIEW OF SYSTEMS:  Constitutional:  The patient's appetite and energy in the last 2 months but particularly in the last 2 weeks have been poor. Her activities have been hindered by back pain. She states she now spends, \"at least 90% of the time sitting around or lying around.\" She denies fevers, chills, or drenching night sweats. She states that she has lost about 10 pounds in the last month.  Ear/Nose/Throat/Mouth:  She reports no ear pains, sinus symptoms, sore throat, nosebleeds, or sore tongue. She has no headaches. She denies any hoarseness, change in voice quality, or hemoptysis.    Ocular:  She reports no eye pain, significant change in visual acuity, double vision, or blurry vision.  Respiratory:  She admits to baseline exertional dyspnea and shortness of breath with her routine activities though has been avoiding exertiin. She has had a chronic cough sometimes productive of discolored phlegm but denies overt hemoptysis or pleuritic chest pain.  Cardiovascular:  She reports no exertional chest pain, chest pressure, or chest heaviness. She reports no claudication. She reports no palpitations or symptomatic orthostasis.  Gastrointestinal:  She denies dysphagia, nausea, vomiting, postprandial abdominal pain, bloating, cramping, or any recent melena or bright red blood per rectum. She admits to constipation alternating with soft stools without overt diarrhea. She has never had a colonoscopy.   Genitourinary:  She has had recent onset dysuria, frequency, and urgency for the past several weeks associated with a blood-tinged vaginal discharge. She denies difficulty controlling her bladder. She admits to nocturia.   Musculoskeletal:  As noted in the history of present illness, she has had right paraspinal pain with worsening right hip pain and worsening distal radiculopathy to her posterior right thigh and knee. She denies other arthralgias, myalgias, or nighttime leg " cramping.  Extremities:  She reports no trouble with fluid retention or significant leg swelling.  Endocrine:  She reports no problems with excess thirst, excessive urination, vasomotor instability.  Heme/Lymphatic:  She reports no unexplained bleeding, bruising, petechial rashes, or swollen glands.  Skin:  She reports no itching, rashes, or lesions which won't heal.  Neuro:  She reports no loss of consciousness, seizures, fainting spells, or dizziness. She reports no weakness of her face, arms, or legs. She has no difficulty with speech. She has no tremors.  Psych:  She admits to situational anxiety but denies overt depression.    VITAL SIGNS: Blood pressure 154/78, pulse 99, respiratory rate 18, temperature 97.9, O2 saturation 95% on room air    PHYSICAL EXAMINATION:  General:  She is a pleasant, rather frail, chronically ill-appearing elderly female who currently appears comfortable while lying in her hospital bed. She appears to be her stated age. Her skin color is normal. ECOG PS = 3. No family is present at the bedside.  Head/Neck:  The patient is anicteric and atraumatic. The mouth and throat are clear. Poor dentition and halitosis.  The trachea is midline. The neck is supple without evidence of jugular venous distention or cervical adenopathy.   Eyes:  The pupils are equal, round, and reactive to light. The extraocular movements are full. There is no scleral jaundice or erythema.    Chest:  The respiratory efforts are unhindered. Breath sounds are globally diminished bilaterally.   Cardiovascular:  The patient has a regular cardiac rate and rhythm without murmurs, rubs, or gallops. The peripheral pulses are equal and full.  Abdomen:  The abdomen is soft, flat, and nontender. No overt organomegaly or mass-effect appreciated. Bowel sounds are hypoactive but present. No tenderness, guarding, or rigidity appreciated.  Genitourinary:  External exam is notable for an erosive lesion with an overlying eschar on the  right labia but no active bleeding at the moment.  Extremities:  There is no evidence of cyanosis, clubbing, or edema.  Rheumatologic:  There are subtle osteoarthritic changes of the hands.  The gait is not observed today.  Cutaneous:  There are no overt rashes, disseminated lesions, purpura, or petechiae.    Lymphatics:  There is no evidence of adenopathy in the cervical, supraclavicular, axillary, inguinal, or femoral areas. There is no splenomegaly.  Neurologic:  The patient is alert, oriented, cooperative, and pleasant. There is no focal weakness of the upper extremities. She moves her lower extremities bilaterally with weakness in the right leg.   Psych:  Mood and affect are appropriate for circumstance. Eye contact is appropriate.     PROBLEMS IDENTIFIED:  1.    Widely metastatic malignancy, agree likely lung primary with small cell carcinoma at the forefront of all the differential possibilities.   • Tumor stage: IV or extensive stage (cT4 cN2 M1).  • Tumor burden: 5.6 x 5.7 cm mass within the left lower lobe with 1.3 cm peripheral satellite lesion in the left lower lobe, pleural based paraspinal mass in the medial left lung, 2.2 x 1.5 cm contiguous with infrahilar lymphadenopathy, and 2.5 x 3.9 cm contiguous mass posterior to the left atrium inseparable from the esophagus. 4.9 x 2.9 cm prevascular node. Effacement of the distal left main pulmonary artery along its posterior margin related to the hilar component of the neoplasm. Bilateral adrenal metastases (5.5 x 6.1 on the left and 6.7 x 5.4 cm on the right). Interaortocaval node 1.9 x 2.3 cm. Pleural studding. Ill-defined hypodense lesion involving the posterior interpolar aspect of the left kidney suspicious for metastatic disease. L3 vertebral body epidural mass associated with extension into the ventral epidural space (1.4 x 1.0 cm extradural mass associated with effacement of the thecal sac).   • Complications of tumor: Acute 2 column fracture  involving L3 vertebral body involving the anterior and middle column and right pedicle with associated edema. Resultant myelopathic symptoms with radicular pain to the right leg and inability to walk.  • Tumor status: Untreated. Tissue diagnosis pending.  • Prognosis: Poor.  2.    Vulvar/right labial mass/lesion. 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  3.    Long-standing tobacco smoker (1/2 pack per day since age 18).  4.    Coronary artery calcifications (right coronary left anterior descending (LAD) and circumflex distributions).  5.    Poor performance status (ECOG 3).  6.    Poor overall prognosis.    RECOMMENDATIONS:  1.     Re: The patient is apprised of the available diagnostic information. I explained that she likely has extensive stage lung malignancy for which cure is not possible and for which the intent of therapy is palliative.  2.    Care discussed with Dr. Benitez. Plans for L3 laminectomy and tumor debulking with biopsy, kyphoplasty, and radiofrequency treatment noted. Also noted that Dr. Lugo will be doing a vaginal biopsy at the same time.  3.    Brain MRI and bone scan to complete staging.  4.    Baseline CEA.  5.    Will need Mediport placement down the line if she consents to (palliative) chemotherapy and/or immunotherapy depending on pathologic diagnosis.  6.    Continue general medical support.  7.    Radiation oncology consult pending.  8.    Further recommendations pending.      Thank you for the opportunity to see the patient in consultation.    Tyson Rincon MD  [facility]     cc:  MD Carlyle Aragon MD Thomas Gruber, MD (referring)          Electronically signed by Tyson Rincon MD 12/09/2018 15:56 CST

## 2018-12-09 NOTE — PLAN OF CARE
Problem: Patient Care Overview  Goal: Plan of Care Review  Outcome: Ongoing (interventions implemented as appropriate)   12/09/18 0053   Plan of Care Review   Progress no change   OTHER   Outcome Summary Pt still having lots of discomfort, medicated with prn pain meds whenever they are available, urine specimen sent, voiding per bedside commode, IVF infusing, gynecology consult placed, will cont to monitor for any changes.

## 2018-12-09 NOTE — PROGRESS NOTES
Kasey Gabriel  68 y.o.      Chief complaint:   Back pain     Subjective  68-year-old female presents with worsening back pain and lower extremity radicular pain.  Pain controlled overnight helpful with medication.    Temp:  [97.9 °F (36.6 °C)-98.6 °F (37 °C)] 97.9 °F (36.6 °C)  Heart Rate:  [] 99  Resp:  [14-18] 18  BP: (113-162)/(76-91) 154/78      Objective    Neurologic Exam  Neurologic Exam      Mental Status   Oriented to person, place, and time.   Speech: speech is normal   Level of consciousness: alert  Knowledge: good.      Cranial Nerves   Cranial nerves II through XII intact.      Motor Exam   Muscle bulk: normal  Overall muscle tone: normal  Right arm pronator drift: absent  Left arm pronator drift: absent     Strength   Strength 5/5 except as noted.   Right iliopsoas: 2/5           Compression fracture of L3 lumbar vertebra (CMS/HCC)      Lab Results (last 24 hours)     Procedure Component Value Units Date/Time    Urine Culture - Urine, [[id number]]  (Normal) Collected:  12/08/18 2319    Specimen:  Urine Updated:  12/09/18 0619     Urine Culture No growth at 24 hours    Urinalysis With Culture If Indicated - [[id number]]  (Abnormal) Collected:  12/08/18 2319    Specimen:  Urine Updated:  12/08/18 2344     Color, UA Yellow     Appearance, UA Clear     pH, UA 7.0     Specific Gravity, UA 1.021     Glucose, UA Negative     Ketones, UA Negative     Bilirubin, UA Negative     Blood, UA Small (1+)     Protein, UA Negative     Leuk Esterase, UA Large (3+)     Nitrite, UA Negative     Urobilinogen, UA 0.2 E.U./dL    Urinalysis, Microscopic Only - Urine, Clean Catch [[id number]]  (Abnormal) Collected:  12/08/18 2319    Specimen:  Urine, Clean Catch Updated:  12/08/18 2344     RBC, UA 6-12 /HPF      WBC, UA 31-50 /HPF      Bacteria, UA None Seen /HPF      Squamous Epithelial Cells, UA None Seen /HPF      Hyaline Casts, UA None Seen /LPF      Methodology Automated Microscopy    aPTT [[id number]]  (Normal)  Collected:  12/08/18 1108    Specimen:  Blood Updated:  12/08/18 1134     PTT 30.1 seconds     Protime-INR [[id number]]  (Normal) Collected:  12/08/18 1108    Specimen:  Blood Updated:  12/08/18 1134     Protime 13.2 Seconds      INR 0.97              Plan:   Metastatic workup concerning for widely metastatic disease.  Primary unclear at this point.  Appreciate Dr. Lugo's input regarding vaginal pathology.  My plan would be to take her to the operating room tomorrow afternoon for a L3 laminectomy and debulking of her spinal cord tumor followed by biopsy of the vertebral body, kyphoplasty, radiofrequency treatment at that level.  Dr. Lugo has expressed interest to perform a vaginal biopsy at the same time.  We are and get Dr. Velasco down in consultation to see her today or first thing in the morning.  The risks and benefits of the procedure were discussed at length which included but were not limited to infection, bleeding, paralysis, spinal fluid leak, bowel bladder incontinence, stroke, coma, and death.  She knowledge understand this.  Her questions concerns were addressed.  We will get her prepped and scheduled for tomorrow.    Erwin Benitez MD

## 2018-12-09 NOTE — PLAN OF CARE
Problem: Skin Injury Risk (Adult)  Goal: Identify Related Risk Factors and Signs and Symptoms  Outcome: Ongoing (interventions implemented as appropriate)  continued with drainage from vaginal area with yellow-greenish drainage. More malodorous today then yesterday.     12/09/18 6502   Skin Injury Risk (Adult)   Related Risk Factors (Skin Injury Risk) tissue perfusion altered;mobility impaired;moisture

## 2018-12-09 NOTE — PLAN OF CARE
Problem: Patient Care Overview  Goal: Plan of Care Review  Outcome: Ongoing (interventions implemented as appropriate)   12/08/18 6612   Coping/Psychosocial   Plan of Care Reviewed With patient   Plan of Care Review   Progress no change

## 2018-12-10 ENCOUNTER — APPOINTMENT (OUTPATIENT)
Dept: NUCLEAR MEDICINE | Facility: HOSPITAL | Age: 68
End: 2018-12-10

## 2018-12-10 ENCOUNTER — ANESTHESIA (OUTPATIENT)
Dept: PERIOP | Facility: HOSPITAL | Age: 68
End: 2018-12-10

## 2018-12-10 ENCOUNTER — ANESTHESIA EVENT (OUTPATIENT)
Dept: PERIOP | Facility: HOSPITAL | Age: 68
End: 2018-12-10

## 2018-12-10 ENCOUNTER — APPOINTMENT (OUTPATIENT)
Dept: GENERAL RADIOLOGY | Facility: HOSPITAL | Age: 68
End: 2018-12-10

## 2018-12-10 PROBLEM — C80.1 MALIGNANT NEOPLASM METASTATIC TO LUMBAR SPINE WITH UNKNOWN PRIMARY SITE (HCC): Status: ACTIVE | Noted: 2018-12-08

## 2018-12-10 PROBLEM — C79.51 MALIGNANT NEOPLASM METASTATIC TO LUMBAR SPINE WITH UNKNOWN PRIMARY SITE (HCC): Status: ACTIVE | Noted: 2018-12-08

## 2018-12-10 LAB
BACTERIA SPEC AEROBE CULT: ABNORMAL
CEA SERPL-MCNC: 11.6 NG/ML (ref 0–5)

## 2018-12-10 PROCEDURE — 25010000002 IOPAMIDOL 61 % SOLUTION: Performed by: NEUROLOGICAL SURGERY

## 2018-12-10 PROCEDURE — 25010000002 FENTANYL CITRATE (PF) 100 MCG/2ML SOLUTION: Performed by: NURSE ANESTHETIST, CERTIFIED REGISTERED

## 2018-12-10 PROCEDURE — 88342 IMHCHEM/IMCYTCHM 1ST ANTB: CPT | Performed by: NEUROLOGICAL SURGERY

## 2018-12-10 PROCEDURE — 94799 UNLISTED PULMONARY SVC/PX: CPT

## 2018-12-10 PROCEDURE — 94760 N-INVAS EAR/PLS OXIMETRY 1: CPT

## 2018-12-10 PROCEDURE — 25010000002 METOCLOPRAMIDE PER 10 MG: Performed by: ANESTHESIOLOGY

## 2018-12-10 PROCEDURE — 88342 IMHCHEM/IMCYTCHM 1ST ANTB: CPT | Performed by: OBSTETRICS & GYNECOLOGY

## 2018-12-10 PROCEDURE — 78306 BONE IMAGING WHOLE BODY: CPT

## 2018-12-10 PROCEDURE — 25010000002 ONDANSETRON PER 1 MG: Performed by: NURSE ANESTHETIST, CERTIFIED REGISTERED

## 2018-12-10 PROCEDURE — 99024 POSTOP FOLLOW-UP VISIT: CPT | Performed by: NURSE PRACTITIONER

## 2018-12-10 PROCEDURE — 20982 ABLATE BONE TUMOR(S) PERQ: CPT | Performed by: NEUROLOGICAL SURGERY

## 2018-12-10 PROCEDURE — 22514 PERQ VERTEBRAL AUGMENTATION: CPT | Performed by: NEUROLOGICAL SURGERY

## 2018-12-10 PROCEDURE — 82728 ASSAY OF FERRITIN: CPT | Performed by: INTERNAL MEDICINE

## 2018-12-10 PROCEDURE — A9561 TC99M OXIDRONATE: HCPCS | Performed by: NEUROLOGICAL SURGERY

## 2018-12-10 PROCEDURE — 82746 ASSAY OF FOLIC ACID SERUM: CPT | Performed by: INTERNAL MEDICINE

## 2018-12-10 PROCEDURE — 88341 IMHCHEM/IMCYTCHM EA ADD ANTB: CPT | Performed by: NEUROLOGICAL SURGERY

## 2018-12-10 PROCEDURE — 0QU03JZ SUPPLEMENT LUMBAR VERTEBRA WITH SYNTHETIC SUBSTITUTE, PERCUTANEOUS APPROACH: ICD-10-PCS | Performed by: NEUROLOGICAL SURGERY

## 2018-12-10 PROCEDURE — 25010000002 ONDANSETRON PER 1 MG: Performed by: ANESTHESIOLOGY

## 2018-12-10 PROCEDURE — 63277 BX/EXC XDRL SPINE LESN LMBR: CPT | Performed by: NEUROLOGICAL SURGERY

## 2018-12-10 PROCEDURE — 0UBM0ZX EXCISION OF VULVA, OPEN APPROACH, DIAGNOSTIC: ICD-10-PCS | Performed by: OBSTETRICS & GYNECOLOGY

## 2018-12-10 PROCEDURE — 0 TECHNETIUM OXIDRONATE KIT: Performed by: NEUROLOGICAL SURGERY

## 2018-12-10 PROCEDURE — 93010 ELECTROCARDIOGRAM REPORT: CPT | Performed by: INTERNAL MEDICINE

## 2018-12-10 PROCEDURE — 25010000002 PROPOFOL 10 MG/ML EMULSION: Performed by: NURSE ANESTHETIST, CERTIFIED REGISTERED

## 2018-12-10 PROCEDURE — 88305 TISSUE EXAM BY PATHOLOGIST: CPT | Performed by: OBSTETRICS & GYNECOLOGY

## 2018-12-10 PROCEDURE — C1713 ANCHOR/SCREW BN/BN,TIS/BN: HCPCS | Performed by: NEUROLOGICAL SURGERY

## 2018-12-10 PROCEDURE — 25010000002 DEXAMETHASONE PER 1 MG: Performed by: NURSE ANESTHETIST, CERTIFIED REGISTERED

## 2018-12-10 PROCEDURE — 76000 FLUOROSCOPY <1 HR PHYS/QHP: CPT

## 2018-12-10 PROCEDURE — 72100 X-RAY EXAM L-S SPINE 2/3 VWS: CPT

## 2018-12-10 PROCEDURE — 0QS03ZZ REPOSITION LUMBAR VERTEBRA, PERCUTANEOUS APPROACH: ICD-10-PCS | Performed by: NEUROLOGICAL SURGERY

## 2018-12-10 PROCEDURE — 25010000002 NEOSTIGMINE PER 0.5 MG: Performed by: NURSE ANESTHETIST, CERTIFIED REGISTERED

## 2018-12-10 PROCEDURE — 56605 BIOPSY OF VULVA/PERINEUM: CPT | Performed by: OBSTETRICS & GYNECOLOGY

## 2018-12-10 PROCEDURE — 25010000002 MIDAZOLAM PER 1 MG: Performed by: ANESTHESIOLOGY

## 2018-12-10 PROCEDURE — 25010000002 FENTANYL CITRATE (PF) 100 MCG/2ML SOLUTION: Performed by: ANESTHESIOLOGY

## 2018-12-10 PROCEDURE — 88341 IMHCHEM/IMCYTCHM EA ADD ANTB: CPT | Performed by: OBSTETRICS & GYNECOLOGY

## 2018-12-10 PROCEDURE — 82378 CARCINOEMBRYONIC ANTIGEN: CPT | Performed by: INTERNAL MEDICINE

## 2018-12-10 PROCEDURE — 88307 TISSUE EXAM BY PATHOLOGIST: CPT | Performed by: NEUROLOGICAL SURGERY

## 2018-12-10 PROCEDURE — 0Q903ZX DRAINAGE OF LUMBAR VERTEBRA, PERCUTANEOUS APPROACH, DIAGNOSTIC: ICD-10-PCS | Performed by: NEUROLOGICAL SURGERY

## 2018-12-10 PROCEDURE — 82607 VITAMIN B-12: CPT | Performed by: INTERNAL MEDICINE

## 2018-12-10 PROCEDURE — 25010000002 MORPHINE PER 10 MG: Performed by: NEUROLOGICAL SURGERY

## 2018-12-10 PROCEDURE — 25010000002 CEFAZOLIN PER 500 MG: Performed by: NEUROLOGICAL SURGERY

## 2018-12-10 PROCEDURE — 93005 ELECTROCARDIOGRAM TRACING: CPT | Performed by: NEUROLOGICAL SURGERY

## 2018-12-10 PROCEDURE — 88311 DECALCIFY TISSUE: CPT | Performed by: NEUROLOGICAL SURGERY

## 2018-12-10 DEVICE — HEMO ABS GELFOAM PWDR PORCN 1GM: Type: IMPLANTABLE DEVICE | Status: FUNCTIONAL

## 2018-12-10 DEVICE — HEMO ABS GELFOAM SPNG PORCN SZ100: Type: IMPLANTABLE DEVICE | Status: FUNCTIONAL

## 2018-12-10 DEVICE — BONE CEMENT C01B HV-R WITH MIXER  US
Type: IMPLANTABLE DEVICE | Status: FUNCTIONAL
Brand: KYPHON® HV-R® BONE CEMENT AND KYPHON® MIXER PACK

## 2018-12-10 RX ORDER — MEPERIDINE HYDROCHLORIDE 25 MG/ML
12.5 INJECTION INTRAMUSCULAR; INTRAVENOUS; SUBCUTANEOUS
Status: DISCONTINUED | OUTPATIENT
Start: 2018-12-10 | End: 2018-12-10 | Stop reason: HOSPADM

## 2018-12-10 RX ORDER — ONDANSETRON 2 MG/ML
4 INJECTION INTRAMUSCULAR; INTRAVENOUS ONCE AS NEEDED
Status: COMPLETED | OUTPATIENT
Start: 2018-12-10 | End: 2018-12-10

## 2018-12-10 RX ORDER — IPRATROPIUM BROMIDE AND ALBUTEROL SULFATE 2.5; .5 MG/3ML; MG/3ML
3 SOLUTION RESPIRATORY (INHALATION) ONCE
Status: COMPLETED | OUTPATIENT
Start: 2018-12-10 | End: 2018-12-10

## 2018-12-10 RX ORDER — SODIUM CHLORIDE 9 MG/ML
INJECTION, SOLUTION INTRAVENOUS AS NEEDED
Status: DISCONTINUED | OUTPATIENT
Start: 2018-12-10 | End: 2018-12-10 | Stop reason: HOSPADM

## 2018-12-10 RX ORDER — ACETAMINOPHEN 325 MG/1
650 TABLET ORAL EVERY 4 HOURS PRN
Status: DISCONTINUED | OUTPATIENT
Start: 2018-12-10 | End: 2018-12-13 | Stop reason: HOSPADM

## 2018-12-10 RX ORDER — OXYCODONE AND ACETAMINOPHEN 10; 325 MG/1; MG/1
1 TABLET ORAL ONCE AS NEEDED
Status: COMPLETED | OUTPATIENT
Start: 2018-12-10 | End: 2018-12-10

## 2018-12-10 RX ORDER — GLYCOPYRROLATE 0.2 MG/ML
INJECTION INTRAMUSCULAR; INTRAVENOUS AS NEEDED
Status: DISCONTINUED | OUTPATIENT
Start: 2018-12-10 | End: 2018-12-10 | Stop reason: SURG

## 2018-12-10 RX ORDER — OXYCODONE AND ACETAMINOPHEN 7.5; 325 MG/1; MG/1
2 TABLET ORAL ONCE AS NEEDED
Status: DISCONTINUED | OUTPATIENT
Start: 2018-12-10 | End: 2018-12-10 | Stop reason: HOSPADM

## 2018-12-10 RX ORDER — SODIUM CHLORIDE 0.9 % (FLUSH) 0.9 %
3 SYRINGE (ML) INJECTION EVERY 12 HOURS SCHEDULED
Status: DISCONTINUED | OUTPATIENT
Start: 2018-12-10 | End: 2018-12-10 | Stop reason: HOSPADM

## 2018-12-10 RX ORDER — MORPHINE SULFATE 2 MG/ML
2 INJECTION, SOLUTION INTRAMUSCULAR; INTRAVENOUS
Status: DISCONTINUED | OUTPATIENT
Start: 2018-12-10 | End: 2018-12-13 | Stop reason: HOSPADM

## 2018-12-10 RX ORDER — DOCUSATE SODIUM 100 MG/1
100 CAPSULE, LIQUID FILLED ORAL 2 TIMES DAILY PRN
Status: DISCONTINUED | OUTPATIENT
Start: 2018-12-10 | End: 2018-12-13 | Stop reason: HOSPADM

## 2018-12-10 RX ORDER — PROPOFOL 10 MG/ML
VIAL (ML) INTRAVENOUS AS NEEDED
Status: DISCONTINUED | OUTPATIENT
Start: 2018-12-10 | End: 2018-12-10 | Stop reason: SURG

## 2018-12-10 RX ORDER — CARISOPRODOL 350 MG/1
350 TABLET ORAL 4 TIMES DAILY PRN
Status: DISCONTINUED | OUTPATIENT
Start: 2018-12-10 | End: 2018-12-13 | Stop reason: HOSPADM

## 2018-12-10 RX ORDER — MIDAZOLAM HYDROCHLORIDE 1 MG/ML
1 INJECTION INTRAMUSCULAR; INTRAVENOUS
Status: DISCONTINUED | OUTPATIENT
Start: 2018-12-10 | End: 2018-12-10 | Stop reason: HOSPADM

## 2018-12-10 RX ORDER — OXYCODONE AND ACETAMINOPHEN 7.5; 325 MG/1; MG/1
1 TABLET ORAL EVERY 4 HOURS PRN
Status: DISCONTINUED | OUTPATIENT
Start: 2018-12-10 | End: 2018-12-13 | Stop reason: HOSPADM

## 2018-12-10 RX ORDER — BUPIVACAINE HCL/0.9 % NACL/PF 0.1 %
2 PLASTIC BAG, INJECTION (ML) EPIDURAL EVERY 8 HOURS
Status: COMPLETED | OUTPATIENT
Start: 2018-12-10 | End: 2018-12-11

## 2018-12-10 RX ORDER — ROCURONIUM BROMIDE 10 MG/ML
INJECTION, SOLUTION INTRAVENOUS AS NEEDED
Status: DISCONTINUED | OUTPATIENT
Start: 2018-12-10 | End: 2018-12-10 | Stop reason: SURG

## 2018-12-10 RX ORDER — SODIUM CHLORIDE 0.9 % (FLUSH) 0.9 %
3 SYRINGE (ML) INJECTION EVERY 12 HOURS SCHEDULED
Status: DISCONTINUED | OUTPATIENT
Start: 2018-12-10 | End: 2018-12-13 | Stop reason: HOSPADM

## 2018-12-10 RX ORDER — IPRATROPIUM BROMIDE AND ALBUTEROL SULFATE 2.5; .5 MG/3ML; MG/3ML
3 SOLUTION RESPIRATORY (INHALATION) ONCE AS NEEDED
Status: DISCONTINUED | OUTPATIENT
Start: 2018-12-10 | End: 2018-12-10 | Stop reason: HOSPADM

## 2018-12-10 RX ORDER — BUPIVACAINE HYDROCHLORIDE AND EPINEPHRINE 2.5; 5 MG/ML; UG/ML
INJECTION, SOLUTION EPIDURAL; INFILTRATION; INTRACAUDAL; PERINEURAL AS NEEDED
Status: DISCONTINUED | OUTPATIENT
Start: 2018-12-10 | End: 2018-12-10 | Stop reason: HOSPADM

## 2018-12-10 RX ORDER — ACETAMINOPHEN 500 MG
1000 TABLET ORAL ONCE
Status: COMPLETED | OUTPATIENT
Start: 2018-12-10 | End: 2018-12-10

## 2018-12-10 RX ORDER — FENTANYL CITRATE 50 UG/ML
25 INJECTION, SOLUTION INTRAMUSCULAR; INTRAVENOUS AS NEEDED
Status: COMPLETED | OUTPATIENT
Start: 2018-12-10 | End: 2018-12-10

## 2018-12-10 RX ORDER — BISACODYL 10 MG
10 SUPPOSITORY, RECTAL RECTAL DAILY PRN
Status: DISCONTINUED | OUTPATIENT
Start: 2018-12-10 | End: 2018-12-13 | Stop reason: HOSPADM

## 2018-12-10 RX ORDER — BUPIVACAINE HCL/0.9 % NACL/PF 0.1 %
2 PLASTIC BAG, INJECTION (ML) EPIDURAL ONCE
Status: COMPLETED | OUTPATIENT
Start: 2018-12-10 | End: 2018-12-10

## 2018-12-10 RX ORDER — IBUPROFEN 600 MG/1
600 TABLET ORAL ONCE AS NEEDED
Status: DISCONTINUED | OUTPATIENT
Start: 2018-12-10 | End: 2018-12-10 | Stop reason: HOSPADM

## 2018-12-10 RX ORDER — SENNA AND DOCUSATE SODIUM 50; 8.6 MG/1; MG/1
1 TABLET, FILM COATED ORAL NIGHTLY PRN
Status: DISCONTINUED | OUTPATIENT
Start: 2018-12-10 | End: 2018-12-13 | Stop reason: HOSPADM

## 2018-12-10 RX ORDER — KETAMINE HYDROCHLORIDE 50 MG/ML
INJECTION, SOLUTION, CONCENTRATE INTRAMUSCULAR; INTRAVENOUS AS NEEDED
Status: DISCONTINUED | OUTPATIENT
Start: 2018-12-10 | End: 2018-12-10 | Stop reason: SURG

## 2018-12-10 RX ORDER — METOCLOPRAMIDE HYDROCHLORIDE 5 MG/ML
5 INJECTION INTRAMUSCULAR; INTRAVENOUS
Status: COMPLETED | OUTPATIENT
Start: 2018-12-10 | End: 2018-12-10

## 2018-12-10 RX ORDER — LIDOCAINE HYDROCHLORIDE 20 MG/ML
INJECTION, SOLUTION INFILTRATION; PERINEURAL AS NEEDED
Status: DISCONTINUED | OUTPATIENT
Start: 2018-12-10 | End: 2018-12-10 | Stop reason: SURG

## 2018-12-10 RX ORDER — ONDANSETRON 2 MG/ML
INJECTION INTRAMUSCULAR; INTRAVENOUS AS NEEDED
Status: DISCONTINUED | OUTPATIENT
Start: 2018-12-10 | End: 2018-12-10 | Stop reason: SURG

## 2018-12-10 RX ORDER — MAGNESIUM HYDROXIDE 1200 MG/15ML
LIQUID ORAL AS NEEDED
Status: DISCONTINUED | OUTPATIENT
Start: 2018-12-10 | End: 2018-12-10 | Stop reason: HOSPADM

## 2018-12-10 RX ORDER — SODIUM CHLORIDE, SODIUM LACTATE, POTASSIUM CHLORIDE, CALCIUM CHLORIDE 600; 310; 30; 20 MG/100ML; MG/100ML; MG/100ML; MG/100ML
100 INJECTION, SOLUTION INTRAVENOUS CONTINUOUS
Status: DISCONTINUED | OUTPATIENT
Start: 2018-12-10 | End: 2018-12-10 | Stop reason: SDUPTHER

## 2018-12-10 RX ORDER — NALOXONE HCL 0.4 MG/ML
0.4 VIAL (ML) INJECTION AS NEEDED
Status: DISCONTINUED | OUTPATIENT
Start: 2018-12-10 | End: 2018-12-10 | Stop reason: HOSPADM

## 2018-12-10 RX ORDER — MIDAZOLAM HYDROCHLORIDE 1 MG/ML
2 INJECTION INTRAMUSCULAR; INTRAVENOUS
Status: DISCONTINUED | OUTPATIENT
Start: 2018-12-10 | End: 2018-12-10 | Stop reason: HOSPADM

## 2018-12-10 RX ORDER — SODIUM CHLORIDE 0.9 % (FLUSH) 0.9 %
1-10 SYRINGE (ML) INJECTION AS NEEDED
Status: DISCONTINUED | OUTPATIENT
Start: 2018-12-10 | End: 2018-12-10 | Stop reason: HOSPADM

## 2018-12-10 RX ORDER — DEXAMETHASONE SODIUM PHOSPHATE 4 MG/ML
INJECTION, SOLUTION INTRA-ARTICULAR; INTRALESIONAL; INTRAMUSCULAR; INTRAVENOUS; SOFT TISSUE AS NEEDED
Status: DISCONTINUED | OUTPATIENT
Start: 2018-12-10 | End: 2018-12-10 | Stop reason: SURG

## 2018-12-10 RX ORDER — LABETALOL HYDROCHLORIDE 5 MG/ML
5 INJECTION, SOLUTION INTRAVENOUS
Status: DISCONTINUED | OUTPATIENT
Start: 2018-12-10 | End: 2018-12-10 | Stop reason: HOSPADM

## 2018-12-10 RX ORDER — SODIUM CHLORIDE 0.9 % (FLUSH) 0.9 %
1-10 SYRINGE (ML) INJECTION AS NEEDED
Status: DISCONTINUED | OUTPATIENT
Start: 2018-12-10 | End: 2018-12-13 | Stop reason: HOSPADM

## 2018-12-10 RX ORDER — ONDANSETRON 2 MG/ML
4 INJECTION INTRAMUSCULAR; INTRAVENOUS EVERY 6 HOURS PRN
Status: DISCONTINUED | OUTPATIENT
Start: 2018-12-10 | End: 2018-12-13 | Stop reason: HOSPADM

## 2018-12-10 RX ORDER — SODIUM CHLORIDE 9 MG/ML
75 INJECTION, SOLUTION INTRAVENOUS CONTINUOUS
Status: DISCONTINUED | OUTPATIENT
Start: 2018-12-10 | End: 2018-12-13 | Stop reason: HOSPADM

## 2018-12-10 RX ORDER — NALOXONE HCL 0.4 MG/ML
0.4 VIAL (ML) INJECTION
Status: DISCONTINUED | OUTPATIENT
Start: 2018-12-10 | End: 2018-12-13 | Stop reason: HOSPADM

## 2018-12-10 RX ORDER — FENTANYL CITRATE 50 UG/ML
INJECTION, SOLUTION INTRAMUSCULAR; INTRAVENOUS AS NEEDED
Status: DISCONTINUED | OUTPATIENT
Start: 2018-12-10 | End: 2018-12-10 | Stop reason: SURG

## 2018-12-10 RX ORDER — VASOPRESSIN 20 U/ML
INJECTION PARENTERAL AS NEEDED
Status: DISCONTINUED | OUTPATIENT
Start: 2018-12-10 | End: 2018-12-10 | Stop reason: SURG

## 2018-12-10 RX ADMIN — DEXAMETHASONE SODIUM PHOSPHATE 4 MG: 4 INJECTION, SOLUTION INTRAMUSCULAR; INTRAVENOUS at 15:40

## 2018-12-10 RX ADMIN — PROPOFOL 200 MG: 10 INJECTION, EMULSION INTRAVENOUS at 15:31

## 2018-12-10 RX ADMIN — MORPHINE SULFATE 4 MG: 4 INJECTION INTRAVENOUS at 13:06

## 2018-12-10 RX ADMIN — SODIUM CHLORIDE, POTASSIUM CHLORIDE, SODIUM LACTATE AND CALCIUM CHLORIDE: 600; 310; 30; 20 INJECTION, SOLUTION INTRAVENOUS at 17:26

## 2018-12-10 RX ADMIN — FENTANYL CITRATE 100 MCG: 50 INJECTION, SOLUTION INTRAMUSCULAR; INTRAVENOUS at 15:45

## 2018-12-10 RX ADMIN — TECHNETIUM TC 99M OXIDRONATE 1 DOSE: 3.15 INJECTION, POWDER, LYOPHILIZED, FOR SOLUTION INTRAVENOUS at 08:00

## 2018-12-10 RX ADMIN — FENTANYL CITRATE 100 MCG: 50 INJECTION, SOLUTION INTRAMUSCULAR; INTRAVENOUS at 15:31

## 2018-12-10 RX ADMIN — ONDANSETRON 4 MG: 2 INJECTION INTRAMUSCULAR; INTRAVENOUS at 18:02

## 2018-12-10 RX ADMIN — SODIUM CHLORIDE, PRESERVATIVE FREE 3 ML: 5 INJECTION INTRAVENOUS at 20:53

## 2018-12-10 RX ADMIN — OXYCODONE HYDROCHLORIDE AND ACETAMINOPHEN 1 TABLET: 10; 325 TABLET ORAL at 19:08

## 2018-12-10 RX ADMIN — FENTANYL CITRATE 25 MCG: 50 INJECTION INTRAMUSCULAR; INTRAVENOUS at 18:15

## 2018-12-10 RX ADMIN — ONDANSETRON HYDROCHLORIDE 4 MG: 2 SOLUTION INTRAMUSCULAR; INTRAVENOUS at 15:40

## 2018-12-10 RX ADMIN — GLYCOPYRROLATE 0.4 MG: 0.2 INJECTION, SOLUTION INTRAMUSCULAR; INTRAVENOUS at 17:28

## 2018-12-10 RX ADMIN — Medication 2 G: at 15:32

## 2018-12-10 RX ADMIN — LIDOCAINE HYDROCHLORIDE 100 MG: 20 INJECTION, SOLUTION INFILTRATION; PERINEURAL at 15:31

## 2018-12-10 RX ADMIN — FENTANYL CITRATE 25 MCG: 50 INJECTION INTRAMUSCULAR; INTRAVENOUS at 18:09

## 2018-12-10 RX ADMIN — OXYCODONE HYDROCHLORIDE AND ACETAMINOPHEN 1 TABLET: 7.5; 325 TABLET ORAL at 21:00

## 2018-12-10 RX ADMIN — METOCLOPRAMIDE 5 MG: 5 INJECTION, SOLUTION INTRAMUSCULAR; INTRAVENOUS at 18:21

## 2018-12-10 RX ADMIN — SODIUM CHLORIDE 75 ML/HR: 9 INJECTION, SOLUTION INTRAVENOUS at 20:47

## 2018-12-10 RX ADMIN — SODIUM CHLORIDE, POTASSIUM CHLORIDE, SODIUM LACTATE AND CALCIUM CHLORIDE: 600; 310; 30; 20 INJECTION, SOLUTION INTRAVENOUS at 15:00

## 2018-12-10 RX ADMIN — IPRATROPIUM BROMIDE AND ALBUTEROL SULFATE 3 ML: 2.5; .5 SOLUTION RESPIRATORY (INHALATION) at 14:59

## 2018-12-10 RX ADMIN — KETAMINE HYDROCHLORIDE 100 MG: 50 INJECTION, SOLUTION INTRAMUSCULAR; INTRAVENOUS at 15:35

## 2018-12-10 RX ADMIN — MORPHINE SULFATE 4 MG: 4 INJECTION INTRAVENOUS at 07:45

## 2018-12-10 RX ADMIN — MORPHINE SULFATE 4 MG: 4 INJECTION INTRAVENOUS at 10:36

## 2018-12-10 RX ADMIN — MORPHINE SULFATE 4 MG: 4 INJECTION INTRAVENOUS at 04:44

## 2018-12-10 RX ADMIN — LIDOCAINE 1 PATCH: 50 PATCH CUTANEOUS at 09:16

## 2018-12-10 RX ADMIN — ACETAMINOPHEN 1000 MG: 500 TABLET ORAL at 14:59

## 2018-12-10 RX ADMIN — CEFAZOLIN SODIUM 2 G: 10 INJECTION, POWDER, FOR SOLUTION INTRAVENOUS at 23:11

## 2018-12-10 RX ADMIN — METOCLOPRAMIDE 5 MG: 5 INJECTION, SOLUTION INTRAMUSCULAR; INTRAVENOUS at 18:09

## 2018-12-10 RX ADMIN — ROCURONIUM BROMIDE 30 MG: 10 INJECTION INTRAVENOUS at 15:31

## 2018-12-10 RX ADMIN — Medication 4 MG: at 17:28

## 2018-12-10 RX ADMIN — FENTANYL CITRATE 25 MCG: 50 INJECTION INTRAMUSCULAR; INTRAVENOUS at 18:17

## 2018-12-10 RX ADMIN — VASOPRESSIN 2 UNITS: 20 INJECTION INTRAVENOUS at 16:27

## 2018-12-10 RX ADMIN — MORPHINE SULFATE 4 MG: 4 INJECTION INTRAVENOUS at 02:45

## 2018-12-10 RX ADMIN — SODIUM CHLORIDE 75 ML/HR: 9 INJECTION, SOLUTION INTRAVENOUS at 23:08

## 2018-12-10 RX ADMIN — FENTANYL CITRATE 100 MCG: 50 INJECTION INTRAMUSCULAR; INTRAVENOUS at 18:20

## 2018-12-10 RX ADMIN — MIDAZOLAM HYDROCHLORIDE 2 MG: 1 INJECTION, SOLUTION INTRAMUSCULAR; INTRAVENOUS at 14:59

## 2018-12-10 NOTE — PROGRESS NOTES
Discharge Planning Assessment  [facility]     Patient Name: Kasey Rios  MRN: [id number]  Today's Date: 12/10/2018    Admit Date: 12/8/2018    Discharge Needs Assessment     Row Name 12/10/18 1414       Living Environment    Lives With  child(jessica), adult;grandchild(jessica)    Current Living Arrangements  home/apartment/condo    Primary Care Provided by  self    Provides Primary Care For  no one    Family Caregiver if Needed  child(jessica), adult;grandchild(jessica), adult    Quality of Family Relationships  helpful;involved;supportive    Able to Return to Prior Arrangements  yes       Resource/Environmental Concerns    Resource/Environmental Concerns  none       Transition Planning    Patient/Family Anticipates Transition to  home with family    Transportation Anticipated  family or friend will provide       Discharge Needs Assessment    Readmission Within the Last 30 Days  no previous admission in last 30 days    Concerns to be Addressed  adjustment to diagnosis/illness    Equipment Currently Used at Home  none    Anticipated Changes Related to Illness  none    Equipment Needed After Discharge  none    Outpatient/Agency/Support Group Needs  homecare agency    Discharge Coordination/Progress  Pt lives at home and her daughter and/or grandson stay with her. She plans to go home at d/c. She does have rx coverage. She does not use DME. Pt not sure if she will need home health or not.         Discharge Plan    No documentation.       Destination      No service coordination in this encounter.      Durable Medical Equipment      No service coordination in this encounter.      Dialysis/Infusion      No service coordination in this encounter.      Home Medical Care      No service coordination in this encounter.      Community Resources      No service coordination in this encounter.          Demographic Summary    No documentation.       Functional Status    No documentation.       Psychosocial    No documentation.        Abuse/Neglect    No documentation.       Legal    No documentation.       Substance Abuse    No documentation.       Patient Forms    No documentation.           VADIM Nicholas

## 2018-12-10 NOTE — OP NOTE
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  Minimally indurated left labia.     Complications: None      Description of Procedure:      After consents were obtained, the patient was taken operating room where general anesthesia was administered.  A complete procedural timeout was performed to ensure proper patient and proper procedure.    Examination under anesthesia revealed the above findings.  Vaginal vault was difficult to adequately examine as it appeared to be replaced by tumor.    I felt that the right labia was the best place to biopsy to obtain a tissue diagnosis and to avoid excessive bleeding.    Use of the needlepoint Bovie was used to excise 3 areas along the margin of the induration of the ulcerated lesion of the right labia.  These were labeled superior, lateral, and inferior, respectively.  Hemostasis was achieved using the Bovie.  No complications occurred.  Instrument counts were correct.     Case was then turned over to Dr. Benitez for his portion.  Please see the details in his dictation.        Alonzo Lugo MD   12/10/2018  3:54 PM

## 2018-12-10 NOTE — PROGRESS NOTES
"Kasey Rios  68 y.o.      Chief complaint:   Back and leg pain    Subjective  No events    Temp:  [97.5 °F (36.4 °C)-98.3 °F (36.8 °C)] 97.5 °F (36.4 °C)  Heart Rate:  [81-96] 88  Resp:  [18] 18  BP: (155-159)/(71-90) 155/88      Objective:  General Appearance:  Comfortable, well-appearing, in no acute distress and in pain.    Vital signs: (most recent): Blood pressure 155/88, pulse 88, temperature 97.5 °F (36.4 °C), temperature source Oral, resp. rate 18, height 152.4 cm (60\"), weight 62.3 kg (137 lb 6.4 oz), SpO2 93 %.  Vital signs are normal.  No fever.    Output: Producing urine.    Lungs:  Normal effort and normal respiratory rate.  She is not in respiratory distress.    Heart: Normal rate.  Regular rhythm.    Chest: Symmetric chest wall expansion.   Skin:  Warm and dry.    Abdomen: Bowel sounds are normal.   There is no abdominal tenderness.     Pulses: Distal pulses are intact.        Neurologic Exam     Mental Status   Oriented to person, place, and time.   Attention: normal. Concentration: normal.   Speech: speech is normal   Level of consciousness: alert    Cranial Nerves   Cranial nerves II through XII intact.     Motor Exam   Muscle bulk: normal    Strength   Strength 5/5 throughout.     Sensory Exam   Light touch normal.         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  Continues to have lower extremity pain.  She has not ambulated.  She is going to the operating room later today for a lumbar kyphoplasty with radiofrequency ablation and biopsy as well as a " vaginal biopsy.    Pernell Aguiar, APRN

## 2018-12-10 NOTE — ANESTHESIA PREPROCEDURE EVALUATION
Anesthesia Evaluation     Patient summary reviewed   no history of anesthetic complications:  NPO Solid Status: > 8 hours  NPO Liquid Status: > 8 hours           Airway   Mallampati: I  No difficulty expected  Dental    (+) edentulous    Pulmonary    (+) a smoker Current, lung cancer, shortness of breath,   Cardiovascular   Exercise tolerance: poor (<4 METS)    ECG reviewed    (+) hypertension,       Neuro/Psych- negative ROS  GI/Hepatic/Renal/Endo - negative ROS     Musculoskeletal     (+) back pain,   Abdominal    Substance History      OB/GYN          Other      history of cancer (Vaginal/Vulvar necrotic mass)           Widely metastatic malignancy- likely lung primary with small cell carcinoma at the forefront of all the differential possibilities:  · Tumor stage: IV or extensive stage (cT4 cN2 M1).  · Tumor burden: 5.6 x 5.7 cm mass within the left lower lobe with 1.3 cm peripheral satellite lesion in the left lower lobe, pleural based paraspinal mass in the medial left lung, 2.2 x 1.5 cm contiguous with infrahilar lymphadenopathy, and 2.5 x 3.9 cm contiguous mass posterior to the left atrium inseparable from the esophagus. 4.9 x 2.9 cm prevascular node. Effacement of the distal left main pulmonary artery along its posterior margin related to the hilar component of the neoplasm. Bilateral adrenal metastases (5.5 x 6.1 on the left and 6.7 x 5.4 cm on the right). Interaortocaval node 1.9 x 2.3 cm. Pleural studding. Ill-defined hypodense lesion involving the posterior interpolar aspect of the left kidney suspicious for metastatic disease. L3 vertebral body epidural mass associated with extension into the ventral epidural space (1.4 x 1.0 cm extradural mass associated with effacement of the thecal sac).                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 Discussed increased risk of intraoperative  complications, possible prolonged intubation, post operative mechanical ventilation, cardiac complications and even death. Patient voiced understanding and desires to proceed.)  intravenous induction   Anesthetic plan, all risks, benefits, and alternatives have been provided, discussed and informed consent has been obtained with: patient.

## 2018-12-10 NOTE — ANESTHESIA PROCEDURE NOTES
ANESTHESIA INTUBATION  Urgency: elective    Airway not difficult    General Information and Staff    Patient location during procedure: OR  CRNA: SOMMER Milan CRNA    Indications and Patient Condition  Indications for airway management: airway protection    Preoxygenated: yes  MILS maintained throughout  Mask difficulty assessment: 1 - vent by mask    Final Airway Details  Final airway type: endotracheal airway      Successful airway: ETT  Cuffed: yes   Successful intubation technique: direct laryngoscopy  Facilitating devices/methods: intubating stylet  Endotracheal tube insertion site: oral  Blade: Ramos  Blade size: 3  ETT size (mm): 7.5  Cormack-Lehane Classification: grade I - full view of glottis  Placement verified by: chest auscultation and capnometry   Cuff volume (mL): 5  Measured from: lips  ETT to lips (cm): 21  Number of attempts at approach: 1

## 2018-12-10 NOTE — CONSULTS
"[facility] Progress Note    Progress Note    Patient:  Kasey Rios  YOB: 1950  Date of Service: 12/10/2018  MRN: [id number]   Acct: [id number]   Primary Care Physician: Provider, No Known  Advance Directive:   Code Status and Medical Interventions:   Ordered at: 12/08/18 0845     Code Status:    CPR     Medical Interventions (Level of Support Prior to Arrest):    Full     Admit Date: 12/8/2018       Hospital Day: 2      Subjective:     Chief Compliant:  Back and radicular pains    Subjective: Restless night with nagging lower back/paraspinal pains with distal radiculitis to the right leg.    Interval history: Anticipate surgery/biopsies today     Review of Systems:   Constitutional / general:  No fever /No chills /No sweats  HEENT: No sore throat /No hoarseness /No vision changes  CV:  No chest pain /No palpitations/No orthopnea   Respiratory:  She admits to baseline exertional dyspnea and shortness of breath with her routine activities though has been avoiding exertiin. She has had a chronic cough sometimes productive of discolored phlegm but denies overt hemoptysis or pleuritic chest pain.  GI: No nausea /No vomiting /No abdominal pain /(+) intermittent soft stools without diarrhea /(+) intermittent constipation/No melena/No hematochezia  :  She has had recent onset dysuria, frequency, and urgency for the past several weeks associated with a blood-tinged vaginal discharge. She denies difficulty controlling her bladder. She admits to nocturia.   Neuro: Persistent right paraspinal pain with worsening right hip pain and worsening distal radiculopathy to her posterior right thigh and knee/No dysphagia /No headache /No paresthesias  Musculoskeletal:  No edema /No cyanosis /(+) back pain    Vitals: /88 (BP Location: Left arm, Patient Position: Lying)   Pulse 88   Temp 97.5 °F (36.4 °C) (Oral)   Resp 18   Ht 152.4 cm (60\")   Wt 62.3 kg (137 lb 6.4 oz)   SpO2 93%   BMI 26.83 " kg/m²     General appearance: alert, appears stated age and cooperative  Skin: Skin color, texture, turgor normal. No rashes or lesions  HEENT: Head: Normal, normocephalic, atraumatic.  Neck: no adenopathy, no carotid bruit, no JVD, supple, symmetrical, trachea midline and thyroid not enlarged, symmetric, no tenderness/mass/nodules  Lungs: diminished breath sounds bilaterally  Heart: regular rate and rhythm, S1, S2 normal, no murmur, click, rub or gallop and regular rate and rhythm  Abdomen: soft, non-tender; bowel sounds normal; no masses,  no organomegaly  Extremities: extremities normal, atraumatic, no cyanosis or edema  Neurologic: Mental status: Alert, oriented, thought content appropriate    24HR INTAKE/OUTPUT:      Intake/Output Summary (Last 24 hours) at 12/10/2018 0811  Last data filed at 12/10/2018 0436  Gross per 24 hour   Intake 954 ml   Output 2800 ml   Net -1846 ml        [place] Catheter: None    Diet: NPO Diet      Medications:   Scheduled Meds:  ceFAZolin 2 g Intravenous Once   dexamethasone 4 mg Oral Q12H   lidocaine 1 patch Transdermal Q24H   sodium chloride 3 mL Intravenous Q12H       Continuous Infusions:  sodium chloride 75 mL/hr Last Rate: 75 mL/hr (12/09/18 1043)       Labs:     Results from last 7 days   Lab Units  12/08/18   0454   WBC 10*3/mm3  13.25*   HEMOGLOBIN g/dL  12.1   HEMATOCRIT %  35.7*   PLATELETS 10*3/mm3  444*        Results from last 7 days   Lab Units  12/08/18   0454   SODIUM mmol/L  135   POTASSIUM mmol/L  4.1   CHLORIDE mmol/L  99   CO2 mmol/L  28.0   BUN mg/dL  18   CREATININE mg/dL  0.63   CALCIUM mg/dL  8.5   GLUCOSE mg/dL  101*       ABG:  No results found for: PHART, PO2ART, NMI8KTA    Culture Data:   Urine Culture   Date Value Ref Range Status   12/08/2018 >100,000 CFU/mL Mixed Gram Positive Sandra (A)  Final       Radiology Data:   Imaging Results (last 72 hours)     Procedure Component Value Units Date/Time    CT Abdomen Pelvis With Contrast [[id number]] Collected:   12/08/18 1643     Updated:  12/08/18 1657    Narrative:       CT ABDOMEN PELVIS W CONTRAST- 12/8/2018 3:58 PM CST     HISTORY: Neoplasm: abdomen, other primary, suspected;  M84.58XA-Pathological fracture in neoplastic disease, other specified  site, initial encounter for fracture; C80.1-Malignant (primary)  neoplasm, unspecified       COMPARISON: None.      DLP: 530 mGy cm. Automated exposure control was utilized to diminish  patient radiation dose.     TECHNIQUE: Following the oral ingestion and intravenous administration  of contrast, helical CT tomographic images of the abdomen and pelvis  were acquired. Coronal reformatted images were also provided for review.        FINDINGS:   There is a large lobular neoplastic mass within the left lower lobe.  There is associated studding of the pleura and major fissure on the  left. A paraspinal mass is also present at the level of the lesion and  the lesion is contiguous with the left infrahilar structures with left  hilar and infrahilar lymphadenopathy. There is also a middle mediastinal  cristi mass inseparable from the distal esophagus and I'm concerned that  there is most likely involvement with the esophagus from this component  of the neoplasm. The right lung bases clear. There is no pleural  effusion..      LIVER: No focal liver lesion. The hepatic vasculature is patent.      BILIARY SYSTEM: The gallbladder is unremarkable. No intrahepatic or  extrahepatic ductal dilatation.      PANCREAS: No focal pancreatic lesion.      SPLEEN: Unremarkable.      KIDNEYS AND ADRENALS: Bilateral necrotic adrenal metastases are present.  These were further discussed in the CT of the chest report. Both of  these measure more than 6 cm in size set. There is an ill-defined  hypodensity associated with the posterior interpolar aspect of the left  kidney measuring approximately 1.6 cm in size. I suspect that this may  also represent a metastasis to the left kidney. The kidneys  otherwise  demonstrate normal enhancement.. The ureters are decompressed and normal  in appearance.     RETROPERITONEUM: There is ectasia of the infrarenal abdominal aorta. An  enlarged necrotic interaortocaval node is present measuring 2.2 x 2.1 cm  in size. A 10 mm short axis left paraortic node is also present..      GI TRACT: Diverticular disease is noted of the descending and sigmoid  colon without evidence of acute diverticulitis. There is no evidence of  mechanical bowel obstruction.. The appendix is surgically absent..     OTHER: No evidence of a mesenteric mass. Some enlarged periportal nodes  are present including a 12 mm short axis and 13 mm short axis node just  above the main portal vein in the landon hepatis. Some smaller nodes are  noted within the gastrohepatic ligament as well.. The abdominopelvic  vasculature is patent. There is a moderate compression fracture which is  a 2 column fracture involving the L3 vertebral body. Along the posterior  margin of this fracture is a epidural soft tissue mass. The bone is  rather permeative in appearance and I suspect this represents a  pathologic fracture related to bony metastases. A rind of soft tissue  density is also noted adjacent to the vertebral body. No other discrete  lesions are demonstrated.. No free fluid or localized inflammation is  present.      PELVIS: No mass lesion, fluid collection or significant lymphadenopathy  is seen in the pelvis. The patient's undergone prior hysterectomy. The  urinary bladder is unremarkable..       Impression:       1. Dominant mass within the left lower lobe of the lung. There is  associated hilar and middle mediastinal adenopathy. A left paraspinal  cristi mass is also present as well as a satellite lesion and pleural  studding.. Also noted is an enlarged interaortocaval node and some  enlarged periportal nodes. There is a ill-defined hypodense lesion  involving the posterior interpolar aspect of the left kidney  suspicious  for metastatic disease. No evidence of hepatic lesions. Marked  enlargement and heterogeneity is noted of both adrenal glands consistent  with large adrenal metastases. Given the appearance of the lesions and  distribution I suspect this may represent a small cell lung carcinoma. A  borderline enlarged left para-aortic node is also present.  2. Diverticulosis of the descending and sigmoid colon without evidence  of acute diverticulitis. No evidence of mechanical bowel obstruction..   3. Moderate two column fracture at the L3 level with a epidural mass  along the posterior margin of the vertebral body. This vertebral body is  rather permeative in appearance and I suspect this represents a  pathologic fracture related to metastatic disease with epidural  extension.        This report was finalized on 12/08/2018 16:54 by Dr. Tj Paredes MD.    CT Chest With Contrast [[id number]] Collected:  12/08/18 1630     Updated:  12/08/18 1644    Narrative:       CT CHEST W CONTRAST- 12/8/2018 3:58 PM CST     HISTORY: Neoplasm: chest, heme/lymphatic, recurrence, suspected/known;  M84.58XA-Pathological fracture in neoplastic disease, other specified  site, initial encounter for fracture; C80.1-Malignant (primary)  neoplasm, unspecified      COMPARISON: None      DLP: 340 mGy cm. Automated exposure control was utilized to diminish  patient radiation dose.     TECHNIQUE: Serial helical tomographic images of the chest were acquired  following the infusion of Isovue contrast intravenously. Bone and soft  tissue algorithms were provided.       FINDINGS:   Neck base: The imaged portion of the neck and thyroid gland is  unremarkable.      Lungs: There are moderate changes of centrilobular emphysema. There is a  heterogeneously enhancing 5.6 x 5.7 cm lobular mass within the left  lower lobe. A peripheral satellite lesion also noted within left lower  lobe measures 1.3 cm in size. There is irregular nodular thickening  of  the major fissure on the left. There is some postobstructive atelectasis  within the left lower lobe. A pleural-based paraspinal mass is noted  within the medial left lung base measuring 2.2 x 1.5 cm in size. This  lower lobe mass is contiguous along its upper aspect with left hilar and  infrahilar lymphadenopathy. It is also contiguous with a mass posterior  to the left atrium which is inseparable from the esophagus. I suspect  there is most likely some involvement of the distal esophagus with the  neoplasm. The mass at this level measures approximately 3.9 x 2.5 cm in  size. There is a granuloma within the right upper lobe. No discrete  right-sided lung lesion is present.. No pleural effusion is present. The  left lower lobe is truncated and extends directly into the left lower  lobe mass lesion. The tracheobronchial tree are otherwise patent..      Heart: There is mild cardiomegaly. Coronary artery calcifications noted  in the right coronary, LAD and circumflex distributions. No evidence of  pericardial effusion..    .      Vasculature: Aorta is normal in caliber and appearance without  significant atherosclerosis, stenosis, or aneurysm. A prevascular node  is present adjacent to the proximal aortic arch. This measures  approximately 4.9 x 2.9 cm in size. There is atheromatous calcification  with mild stenosis at the origin left common carotid artery left  subclavian artery. The proximal great vessels are otherwise  unremarkable. There is effacement of the distal left main pulmonary  artery along its posterior margin related to the hilar component of the  patient's neoplasm.     Lymph nodes: No additional enlarged axillary or mediastinal nodes are  present..      Bones and soft tissues: No acute osseous or soft tissue abnormality is  seen. There are no worrisome osseous lesions.      Upper abdomen: Bilateral adrenal metastases are present. The left  measures 5.5 x 6.1 and the right measures 6.7 x 5.4 cm in  size. A small  hiatal hernia is present. An enlarged interaortocaval node is present  within the upper retroperitoneum measuring 1.9 x 2.3 cm in size..        Impression:       1. Large mass lesion within the left lower lobe. This is contiguous with  the left inferior hilar structures with associated left perihilar  lymphadenopathy. There is also evidence of metastatic disease to the  middle mediastinum at the level of the more distal esophagus. The  margins of the esophagus are difficult to delineate at this level and I  suspect there is secondary involvement of the esophagus. There is also  an enlarged prevascular node as well as a paraspinal cristi mass at the  level of the left lung base. Bilateral adrenal metastases are present.  There is periportal and interaortocaval adenopathy within the upper  abdomen. Satellite lesions are noted within the left lower lobe. There  is nodular thickening of the major fissure on the left as well  consistent with pleural studding..  2. Coronary calcifications as described above.  3. There is some effacement of the left main pulmonary artery along its  posterior aspect related to the hilar cristi component of the mass..        This report was finalized on 12/08/2018 16:41 by Dr. Tj Paredes MD.    CT Lumbar Spine Without Contrast [[id number]] Collected:  12/08/18 0658     Updated:  12/08/18 0735    Addenda:        Addendum: With additional imaging of the spine with MR imaging I am  concerned that the adrenal lesions and L3 fracture most likely represent  sequela of metastatic disease. The L3 vertebral body is somewhat  permeative in appearance which would also suggest the possibility of a  pathologic fracture related to metastatic disease. I suspect the ventral  mass within the spinal canal is related to neoplastic extension into the  spinal canal. There is a suspected lesion within the left lower lobe on  the upper most cut of today's exam and I suspect the constellation of   findings is related to a primary lung carcinoma with metastatic disease  to the adrenal glands and the L3 vertebral body. I spoke with Rin Ryan in the emergency room at 7:30 AM concerning the findings and  concern for metastatic disease resulting in pathologic fracture at L3.  This report was finalized on 12/08/2018 07:32 by Dr. Tj Paredes MD.  Signed:  12/08/18 0732 by Tj Paredes MD    Narrative:       CT LUMBAR SPINE WO CONTRAST- 12/8/2018 3:54 AM CST     History: low back pain     Comparison: None      DLP: 675 mGy cm. Automated exposure control was utilized to diminish  patient radiation dose.     Technique: Serial helical tomographic images of the lumbar spine were  obtained without the use of intravenous contrast. Additionally, sagittal  and coronal reformatted images were provided for review.      Findings:   Alignment: Normal.     Bones: There is an acute 2 column fracture involving the anterior and  middle column of the L3 vertebral body. There is involvement of the  right L3 pedicle. There is no evidence of retropulsion. Centrally there  is an 80% loss of height. Vertebral body heights are otherwise  maintained.     Disc spaces: Maintained.     Canal and neuroforamina: There is no retropulsion at the L3 fracture  there is a 1.4 x 1.0 cm slightly hyperdense extradural mass with  associated effacement of the thecal sac. I feel this likely represents  an epidural hematoma follow-up with MRI would be recommended if not  contraindicated in this patient. Broad-based bulging of disc as well as  facet and ligamentous hypertrophy at the L3-L4, L4-L5 and L5-S1 level  contributes to central and foraminal stenosis.     Soft tissues: A 6.4 cm right super renal mass is demonstrated measuring  Hounsfield units of 34 suggesting it to be solid in nature. This would  also warrant follow-up. Also suspect a left adrenal lesion although not  as well-visualized. Metastatic disease is not entirely  excluded.       Impression:       Impression:   1. Acute 2 column fracture involving anterior middle columns of the L3  vertebral body with involvement of the right pedicle. There is no  evidence of retropulsion of the posterior aspect of the vertebral body  but there is evidence of thecal sac compression secondary to what I  suspect is an epidural hematoma along the ventral aspect of the thecal  sac. This measures approximately 1.4 x 1.0 cm in size. Follow-up imaging  with MRI is recommended if not contraindicated in this patient.  2. Large right suprarenal mass most likely related to the adrenal gland  but incompletely imaged. Adrenal cortical carcinoma is not excluded  given the size of this lesion. Given the history of trauma and adrenal  hematoma would also be in the differential but considered less likely.  Metastatic disease should also be considered. Some fullness within the  left suprarenal space is also suspicious for a left adrenal lesion  although incompletely imaged..        This report was finalized on 12/08/2018 07:07 by Dr. Tj Paredes MD.    MRI Lumbar Spine With & Without Contrast [[id number]] Collected:  12/08/18 0707     Updated:  12/08/18 0732    Narrative:       MRI LUMBAR SPINE W WO CONTRAST- 12/8/2018 6:05 AM CST     HISTORY: rule out epidural hematoma     COMPARISON: CT exam of earlier the same day.      TECHNIQUE: Multiplanar, multisequence MRI of the lumbar spine was  performed with and without the use of contrast.     FINDINGS:      Alignment: There are presumed to be 5 lumbar-type vertebrae with the  most inferior being labeled L5. Normal lumbar lordosis is present. There  is a 2 column fracture involving the anterior and middle columns of the  L3 vertebral body with an approximate 80% loss of height centrally.  There is no evidence of retropulsion of the vertebral body. There is  diffuse edema within the vertebral body as would be anticipated.. As  suspected by CT examination there  is a ventral mass along the posterior  margin of the L3 vertebral body measuring approximately 2.2 cm in  transverse dimension by 1.1 cm in anterior to posterior dimension. This  is resulting in effacement of the thecal sac with moderate central  spinal stenosis. The anterior posterior dimension of the canal is  narrowed to 6 mm at this level.     Marrow signal: There is diffuse abnormal signal within the L3 vertebral  body. There is otherwise normal marrow signal throughout the lumbar  spine. Degenerative disc disease is noted lower 2 lumbar disc levels  with mild disc desiccation..      Cord/Canal: The conus medullaris terminates at the level of L1. The  visualized spinal cord is normal in signal and morphology.      Soft tissues: Paraspinal soft tissue is noted related at the level of  the fractured L3 representing mild paraspinal hematoma versus tumor..  Bilateral adrenal masses are present each of which measure over 6 cm in  size. Given the size of the lesions adenomas are considered unlikely.  Follow-up with CT imaging of the chest, abdomen and pelvis is  recommended. Metastatic disease is not excluded.     Levels:        L1-L2: No disc bulge is present. There is moderate facet and mild  ligamentous hypertrophy. No evidence of central or foraminal stenosis..      L2-L3: There is mild bulging of disc as well as moderate facet and  ligamentous hypertrophy with mild ventral and posterolateral effacement  of the thecal sac. There is no central spinal stenosis. Bulging of the  disc and facet overgrowth does contribute to mild bilateral neural  foraminal narrowing..      L3-L4: Above the L3-L4 disc level there is the previously discussed  epidural mass which is resulting in effacement of the thecal sac with  moderate to severe central spinal stenosis. At the L3-L4 disc level  there is broad-based bulging disc as well as moderate facet and  ligamentous hypertrophy also contributing to mild central spinal  stenosis.  There is severe bilateral neural foraminal narrowing related  to bulging disc and facet overgrowth along the under margin of the  foramen and the epidural hematoma along the upper margin of both  foramen..      L4-L5: There is broad-based bulging of the disc with an annular fissure  along the posterior central annulus. There is moderate facet and  ligamentous hypertrophy with moderate central spinal stenosis. Facet and  ligamentous hypertrophy and bulging of the disc results in severe  bilateral neural foraminal narrowing..      L5-S1: There is bulging of disc with a small central disc protrusion.  There is moderate facet and ligamentous hypertrophy. No evidence of  central spinal stenosis. Severe right-sided and moderate left-sided  foraminal narrowing is present..      Postcontrast: There is diffuse enhancement associated with the L3  vertebral body. There is also enhancement associated with the epidural  mass. Although some enhancement could be anticipated associated with a  fracture the enhancement of the epidural mass I feel is concerning for a  pathologic fracture with epidural extension of neoplasia. Bilateral  bilateral adrenal mass lesions demonstrating peripheral enhancement with  a question with some central necrosis is also suggestive of metastatic  disease.. The right lesion measures 6.1 cm and the left adrenal lesion  6.2 cm in size. Adrenal metastasis should be considered in the  differential. Follow-up imaging of the chest, abdomen and pelvis would  be suggested, particularly if the patient is a smoker.       Impression:       1. Acute 2 column fracture involving the L3 vertebral body involving the  anterior and middle column and right pedicle with associated edema and  paraspinal soft tissue consistent with either hematoma or neoplasia.  There is no retropulsion of the posterior aspect of the vertebral body  but there is a moderate size epidural mass within the ventral spinal  canal resulting in  effacement of the thecal sac and moderate to severe  central spinal stenosis. This is also contributing to neural foraminal  narrowing bilaterally at the L3-L4 disc level related to a combination  of bulging disc and facet overgrowth and the epidural mass. Given the  findings within the adrenals and the enhancement of the epidural mass  I'm concerned this represents a pathologic fracture related to  metastatic disease with associated extension into the ventral epidural  space. The patient does not give a history of any recent trauma making a  posttraumatic fracture at L3 less likely. No other discrete spinal  lesions are demonstrated. I would suggest follow-up with a CT of the  chest, abdomen and pelvis to further evaluate for potential metastatic  disease, particularly if the patient is a smoker. The adrenal lesions  are worrisome for metastatic disease.  2. Central and foraminal stenosis at other levels related to bulging the  disc as well as facet and ligamentous hypertrophy..  3. Bilateral adrenal masses. Follow-up CT imaging is recommended.  Metastatic disease is not excluded.        This report was finalized on 12/08/2018 07:28 by Dr. Tj Paredes MD.    XR Hip With or Without Pelvis 2 - 3 View Right [[id number]] Collected:  12/08/18 0656     Updated:  12/08/18 0700    Narrative:       EXAMINATION: AP pelvis and two-view right hip 12/8/2018     HISTORY: Right hip pain     FINDINGS: AP radiograph of the pelvis as well as two-view exam of the  right hip demonstrates no fracture or dislocation. The joint space is  well-preserved. The femoral head is concentric and well located within  the acetabulum. No evidence of AVN.       Impression:       . Normal exam of the pelvis and right hip.  This report was finalized on 12/08/2018 06:57 by Dr. Tj Paredes MD.              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    Widely metastatic malignancy, agree likely lung primary with small cell carcinoma at the forefront of all the differential possibilities.   · Tumor stage: IV or extensive stage (cT4 cN2 M1).  · Tumor burden: 5.6 x 5.7 cm mass within the left lower lobe with 1.3 cm peripheral satellite lesion in the left lower lobe, pleural based paraspinal mass in the medial left lung, 2.2 x 1.5 cm contiguous with infrahilar lymphadenopathy, and 2.5 x 3.9 cm contiguous mass posterior to the left atrium inseparable from the esophagus. 4.9 x 2.9 cm prevascular node. Effacement of the distal left main pulmonary artery along its posterior margin related to the hilar component of the neoplasm. Bilateral adrenal metastases (5.5 x 6.1 on the left and 6.7 x 5.4 cm on the right). Interaortocaval node 1.9 x 2.3 cm. Pleural studding. Ill-defined hypodense lesion involving the posterior interpolar aspect of the left kidney suspicious for metastatic disease. L3 vertebral body epidural mass associated with extension into the ventral epidural space (1.4 x 1.0 cm extradural mass associated with effacement of the thecal sac).   · Complications of tumor: Acute 2 column fracture involving L3 vertebral body involving the anterior and middle column and right pedicle with associated edema. Resultant myelopathic symptoms with radicular pain to the right leg and inability to walk.  · Tumor status: Untreated. Tissue diagnosis pending.  · Prognosis: Poor.  2.    Vulvar/right labial mass/lesion. 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  3.    Long-standing tobacco smoker (1/2 pack per day since age 18).  4.    Coronary artery calcifications (right coronary left anterior descending (LAD) and circumflex distributions).  5.    Poor performance status (ECOG 3).  6.    Poor overall prognosis.     RECOMMENDATIONS:  1.     Re: The patient was  previously apprised of the available diagnostic information. I explained that she likely has extensive stage lung malignancy for which cure is not possible and for which the intent of therapy is palliative.  2.    Care discussed with Dr. Benitez. Plans for L3 laminectomy and tumor debulking with biopsy, kyphoplasty, and radiofrequency treatment noted. Also noted that Dr. Lugo will be doing a vaginal biopsy at the same time.  3.    Brain MRI and bone scan to complete staging.  4.    Baseline CEA.  5.    Will need Mediport placement down the line if she consents to (palliative) chemotherapy and/or immunotherapy depending on pathologic diagnosis.  6.    Continue general medical support.  7.    Radiation oncology consult pending.  8.    Further recommendations pending.

## 2018-12-10 NOTE — PLAN OF CARE
Problem: Skin Injury Risk (Adult)  Goal: Identify Related Risk Factors and Signs and Symptoms  Outcome: Ongoing (interventions implemented as appropriate)    Goal: Skin Health and Integrity  Outcome: Ongoing (interventions implemented as appropriate)      Problem: Patient Care Overview  Goal: Plan of Care Review  Outcome: Ongoing (interventions implemented as appropriate)   12/10/18 1344   Coping/Psychosocial   Plan of Care Reviewed With patient;family   Plan of Care Review   Progress no change   OTHER   Outcome Summary NPO for OR this shift, family at bedside, VSS, medicated PRN with morphine for pain, nuclear bone scan this AM     Goal: Individualization and Mutuality  Outcome: Ongoing (interventions implemented as appropriate)    Goal: Discharge Needs Assessment  Outcome: Ongoing (interventions implemented as appropriate)    Goal: Interprofessional Rounds/Family Conf  Outcome: Ongoing (interventions implemented as appropriate)      Problem: Pain, Acute (Adult)  Goal: Acceptable Pain Control/Comfort Level  Outcome: Ongoing (interventions implemented as appropriate)      Problem: Fall Risk (Adult)  Goal: Identify Related Risk Factors and Signs and Symptoms  Outcome: Ongoing (interventions implemented as appropriate)    Goal: Absence of Fall  Outcome: Ongoing (interventions implemented as appropriate)

## 2018-12-10 NOTE — PLAN OF CARE
Problem: Patient Care Overview  Goal: Plan of Care Review  Outcome: Ongoing (interventions implemented as appropriate)   12/10/18 0029   Coping/Psychosocial   Plan of Care Reviewed With patient   Plan of Care Review   Progress no change   OTHER   Outcome Summary NPO status maintained for surgery this afternoon, IVF infusing, prn pain meds given with reported relief, VSS, will cont to monitor for any changes.

## 2018-12-10 NOTE — OP NOTE
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 The patient now presents forlaminectomy and kyphoplasty and radiofrequency treatment  after discussing therapeutic alternatives.          Surgeon: Erwin Benitez MD     Assistants:None   Anesthesia: General endotracheal anesthesia    ASA Class: 3    Procedure Details   After obtaining informed consent, having the risks and benefits of the procedure explained including but not limited toinfection, bleeding, paralysis, spinal fluid leak, bowel bladder incontinence, extravasation of kyphoplasty cement resulting in neural compression, pulmonary embolism, stroke, coma, and death.  The patient was brought to the operating.  She was given general anesthesia via an endotracheal tube.  She was flipped prone onto a Kaleb axis table.  Portable fluoroscopy was used to localize level of L3.  A preplanned midline incision was marked with indelible marker.  The patient was then prepped and draped in a standard sterile fashion.  The preplanned incision was then infiltrated Marcaine and epinephrine.  A 10 blade scalpel was used to make an incision the dermis and epidermis.  Bovie cautery was used to extend the incision down to subcutaneous and soft tissues to level of the spinous processes.  The most culture and connective tissue then dissected off the spinous process and lamina of L3 on the left and the right.  A Kirt instrument was placed under the lamina fluoroscopy confirm this was a correct  level.  A cerebellar retractor was then placed into the wound.  A Leksell rongeur was then used to remove the supraspinous ligament the interspinous ligament the spinous process and portions of the lamina of L3.  The remainder the laminectomy was conducted using a 3 mm Kerrison.  Once are satisfied that we had adequately decompressed all the neural structures the fluoroscopy was then brought back into the case.  Incisions were made to the left and right of the midline using a 10 blade scalpel.  Jamshidi needles were then advanced through the pedicles at L3 on the left and the right under direct fluoroscopic guidance.  Unit cannulas removed.  Biopsy samples were obtained.  Hand drill was then used to drill 2 channels to the fractured vertebral body on the left and the right under direct fluoroscopic guidance.  2 radiofrequency probes were then placed into the vertebral body.  The treatment was delivered.  The  probes were removed.  2 kyphoplasty balloons were then inserted into the fractured vertebral body from the left and the right.  There were inflated deflated and removed all under direct fluoroscopic guidance.  And then several syringe full of kyphoplasty cement were injected into the fractured vertebral body from the left and the right under direct fluoroscopic guidance.  The cement was let harden.  A 7 flat CAIT drain was placed into the epidural space and anchored to the skin using a 4-0 Monocryl.  The wounds were then copiously irrigated with antibiotic solution.  There are inspected for hemostasis.  The deep tissues were then closed using a series of inverted interrupted 0 Vicryl sutures.  Subcutaneous and soft tissues were closed using a series of inverted interrupted 2-0 Vicryl sutures.  And the skin was closed using a running 4 Monocryl subcuticular.  All sponge needle and instrument counts were correct at the end of the procedure.  The patient was extubated in stable condition returned recovery with about  200 mL of blood loss.    Findings:Epidural tumor, L3 pathologic metastatic compression fracture]    Estimated Blood Loss:  200ml           Drains:7 flat CAIT           Total IV Fluids: ml           Specimens:   ID Type Source Tests Collected by Time   A : right superior labial biopsy Tissue Labia TISSUE PATHOLOGY EXAM Alonzo Lugo MD 12/10/2018 1659   B : right lateral labial biopsy Tissue Labia TISSUE PATHOLOGY EXAM Alonzo Lugo MD 12/10/2018 1700   C : right inferior labial biopsy Tissue Labia TISSUE PATHOLOGY EXAM Alonzo Lugo MD 12/10/2018 1701   D : lumbar 3 biopsy Tissue Spine, Lumbar TISSUE PATHOLOGY EXAM Erwin Benitez MD 12/10/2018 1702              Implants:   Implant Name Type Inv. Item Serial No.  Lot No. LRB No. Used   GELFOAM PWDR 1GM - [id number] Implant GELFOAM PWDR 1GM  [employer]   1   SPNG HEMO GELFOAM COLLGN  - [id number] Implant SPNG HEMO GELFOAM COLLGN   [employer]   1   CMT BONE KYPHX HV R - [id number] Implant CMT BONE KYPHX HV R  [employer] [id number]  1              Complications: None           Disposition: PACU - hemodynamically stable.           Condition: stable        Erwin Benitez MD

## 2018-12-11 ENCOUNTER — APPOINTMENT (OUTPATIENT)
Dept: MRI IMAGING | Facility: HOSPITAL | Age: 68
End: 2018-12-11

## 2018-12-11 ENCOUNTER — APPOINTMENT (OUTPATIENT)
Dept: GENERAL RADIOLOGY | Facility: HOSPITAL | Age: 68
End: 2018-12-11

## 2018-12-11 ENCOUNTER — ANESTHESIA EVENT (OUTPATIENT)
Dept: PERIOP | Facility: HOSPITAL | Age: 68
End: 2018-12-11

## 2018-12-11 ENCOUNTER — ANESTHESIA (OUTPATIENT)
Dept: PERIOP | Facility: HOSPITAL | Age: 68
End: 2018-12-11

## 2018-12-11 LAB
ANION GAP SERPL CALCULATED.3IONS-SCNC: 8 MMOL/L (ref 4–13)
BASOPHILS # BLD AUTO: 0.03 10*3/MM3 (ref 0–0.2)
BASOPHILS NFR BLD AUTO: 0.2 % (ref 0–2)
BUN BLD-MCNC: 11 MG/DL (ref 5–21)
BUN/CREAT SERPL: 18.6 (ref 7–25)
CALCIUM SPEC-SCNC: 8.6 MG/DL (ref 8.4–10.4)
CHLORIDE SERPL-SCNC: 96 MMOL/L (ref 98–110)
CO2 SERPL-SCNC: 29 MMOL/L (ref 24–31)
CREAT BLD-MCNC: 0.59 MG/DL (ref 0.5–1.4)
DEPRECATED RDW RBC AUTO: 44.3 FL (ref 40–54)
EOSINOPHIL # BLD AUTO: 0.17 10*3/MM3 (ref 0–0.7)
EOSINOPHIL NFR BLD AUTO: 1.2 % (ref 0–4)
ERYTHROCYTE [DISTWIDTH] IN BLOOD BY AUTOMATED COUNT: 14.1 % (ref 12–15)
FERRITIN SERPL-MCNC: 106 NG/ML (ref 11.1–264)
FOLATE SERPL-MCNC: 5.02 NG/ML
GFR SERPL CREATININE-BSD FRML MDRD: 101 ML/MIN/1.73
GLUCOSE BLD-MCNC: 87 MG/DL (ref 70–100)
HCT VFR BLD AUTO: 34.8 % (ref 37–47)
HGB BLD-MCNC: 11.7 G/DL (ref 12–16)
IMM GRANULOCYTES # BLD: 0.07 10*3/MM3 (ref 0–0.03)
IMM GRANULOCYTES NFR BLD: 0.5 % (ref 0–5)
LYMPHOCYTES # BLD AUTO: 2.66 10*3/MM3 (ref 0.72–4.86)
LYMPHOCYTES NFR BLD AUTO: 19.3 % (ref 15–45)
MCH RBC QN AUTO: 29 PG (ref 28–32)
MCHC RBC AUTO-ENTMCNC: 33.6 G/DL (ref 33–36)
MCV RBC AUTO: 86.4 FL (ref 82–98)
MONOCYTES # BLD AUTO: 1.47 10*3/MM3 (ref 0.19–1.3)
MONOCYTES NFR BLD AUTO: 10.6 % (ref 4–12)
NEUTROPHILS # BLD AUTO: 9.41 10*3/MM3 (ref 1.87–8.4)
NEUTROPHILS NFR BLD AUTO: 68.2 % (ref 39–78)
NRBC BLD MANUAL-RTO: 0 /100 WBC (ref 0–0)
PLATELET # BLD AUTO: 444 10*3/MM3 (ref 130–400)
PMV BLD AUTO: 9.6 FL (ref 6–12)
POTASSIUM BLD-SCNC: 4 MMOL/L (ref 3.5–5.3)
RBC # BLD AUTO: 4.03 10*6/MM3 (ref 4.2–5.4)
SODIUM BLD-SCNC: 133 MMOL/L (ref 135–145)
VIT B12 BLD-MCNC: 214 PG/ML (ref 239–931)
WBC NRBC COR # BLD: 13.81 10*3/MM3 (ref 4.8–10.8)

## 2018-12-11 PROCEDURE — 02HV33Z INSERTION OF INFUSION DEVICE INTO SUPERIOR VENA CAVA, PERCUTANEOUS APPROACH: ICD-10-PCS | Performed by: SPECIALIST

## 2018-12-11 PROCEDURE — G8988 SELF CARE GOAL STATUS: HCPCS | Performed by: OCCUPATIONAL THERAPIST

## 2018-12-11 PROCEDURE — 97165 OT EVAL LOW COMPLEX 30 MIN: CPT | Performed by: OCCUPATIONAL THERAPIST

## 2018-12-11 PROCEDURE — A9577 INJ MULTIHANCE: HCPCS | Performed by: NEUROLOGICAL SURGERY

## 2018-12-11 PROCEDURE — 0JH63XZ INSERTION OF TUNNELED VASCULAR ACCESS DEVICE INTO CHEST SUBCUTANEOUS TISSUE AND FASCIA, PERCUTANEOUS APPROACH: ICD-10-PCS | Performed by: SPECIALIST

## 2018-12-11 PROCEDURE — 25010000003 CEFAZOLIN PER 500 MG: Performed by: NURSE ANESTHETIST, CERTIFIED REGISTERED

## 2018-12-11 PROCEDURE — 25010000002 FENTANYL CITRATE (PF) 100 MCG/2ML SOLUTION: Performed by: ANESTHESIOLOGY

## 2018-12-11 PROCEDURE — 76000 FLUOROSCOPY <1 HR PHYS/QHP: CPT

## 2018-12-11 PROCEDURE — 25010000002 PROPOFOL 10 MG/ML EMULSION: Performed by: NURSE ANESTHETIST, CERTIFIED REGISTERED

## 2018-12-11 PROCEDURE — 25010000003 CEFAZOLIN PER 500 MG: Performed by: SPECIALIST

## 2018-12-11 PROCEDURE — 97161 PT EVAL LOW COMPLEX 20 MIN: CPT | Performed by: PHYSICAL THERAPIST

## 2018-12-11 PROCEDURE — 94799 UNLISTED PULMONARY SVC/PX: CPT

## 2018-12-11 PROCEDURE — 0 GADOBENATE DIMEGLUMINE 529 MG/ML SOLUTION: Performed by: NEUROLOGICAL SURGERY

## 2018-12-11 PROCEDURE — 70553 MRI BRAIN STEM W/O & W/DYE: CPT

## 2018-12-11 PROCEDURE — 99232 SBSQ HOSP IP/OBS MODERATE 35: CPT | Performed by: OBSTETRICS & GYNECOLOGY

## 2018-12-11 PROCEDURE — 85025 COMPLETE CBC W/AUTO DIFF WBC: CPT | Performed by: NEUROLOGICAL SURGERY

## 2018-12-11 PROCEDURE — 63710000001 DEXAMETHASONE PER 0.25 MG: Performed by: NEUROLOGICAL SURGERY

## 2018-12-11 PROCEDURE — 71045 X-RAY EXAM CHEST 1 VIEW: CPT

## 2018-12-11 PROCEDURE — G8978 MOBILITY CURRENT STATUS: HCPCS | Performed by: PHYSICAL THERAPIST

## 2018-12-11 PROCEDURE — 94760 N-INVAS EAR/PLS OXIMETRY 1: CPT

## 2018-12-11 PROCEDURE — G8987 SELF CARE CURRENT STATUS: HCPCS | Performed by: OCCUPATIONAL THERAPIST

## 2018-12-11 PROCEDURE — 80048 BASIC METABOLIC PNL TOTAL CA: CPT | Performed by: NEUROLOGICAL SURGERY

## 2018-12-11 PROCEDURE — 25010000002 CEFAZOLIN PER 500 MG: Performed by: NEUROLOGICAL SURGERY

## 2018-12-11 PROCEDURE — G8979 MOBILITY GOAL STATUS: HCPCS | Performed by: PHYSICAL THERAPIST

## 2018-12-11 PROCEDURE — 25010000002 METOCLOPRAMIDE PER 10 MG: Performed by: NURSE ANESTHETIST, CERTIFIED REGISTERED

## 2018-12-11 PROCEDURE — 99024 POSTOP FOLLOW-UP VISIT: CPT | Performed by: NURSE PRACTITIONER

## 2018-12-11 PROCEDURE — C1788 PORT, INDWELLING, IMP: HCPCS | Performed by: SPECIALIST

## 2018-12-11 PROCEDURE — 25010000002 MIDAZOLAM PER 1 MG: Performed by: NURSE ANESTHETIST, CERTIFIED REGISTERED

## 2018-12-11 PROCEDURE — 25010000002 MORPHINE PER 10 MG: Performed by: NEUROLOGICAL SURGERY

## 2018-12-11 PROCEDURE — 25010000002 ONDANSETRON PER 1 MG: Performed by: NURSE ANESTHETIST, CERTIFIED REGISTERED

## 2018-12-11 DEVICE — POWERPORT M.R.I. IMPLANTABLE PORT WITH ATTACHABLE 9.6F OPEN-ENDED SINGLE-LUMEN VENOUS CATHETER INTERMEDIATE KIT (WITH SUTURE PLUGS)
Type: IMPLANTABLE DEVICE | Status: FUNCTIONAL
Brand: POWERPORT M.R.I.

## 2018-12-11 RX ORDER — NALOXONE HCL 0.4 MG/ML
0.04 VIAL (ML) INJECTION AS NEEDED
Status: DISCONTINUED | OUTPATIENT
Start: 2018-12-11 | End: 2018-12-11 | Stop reason: HOSPADM

## 2018-12-11 RX ORDER — SODIUM CHLORIDE, SODIUM LACTATE, POTASSIUM CHLORIDE, CALCIUM CHLORIDE 600; 310; 30; 20 MG/100ML; MG/100ML; MG/100ML; MG/100ML
100 INJECTION, SOLUTION INTRAVENOUS CONTINUOUS
Status: DISCONTINUED | OUTPATIENT
Start: 2018-12-11 | End: 2018-12-11 | Stop reason: HOSPADM

## 2018-12-11 RX ORDER — SODIUM CHLORIDE 0.9 % (FLUSH) 0.9 %
3 SYRINGE (ML) INJECTION EVERY 12 HOURS SCHEDULED
Status: DISCONTINUED | OUTPATIENT
Start: 2018-12-11 | End: 2018-12-11 | Stop reason: HOSPADM

## 2018-12-11 RX ORDER — FLUMAZENIL 0.1 MG/ML
0.2 INJECTION INTRAVENOUS AS NEEDED
Status: DISCONTINUED | OUTPATIENT
Start: 2018-12-11 | End: 2018-12-11 | Stop reason: HOSPADM

## 2018-12-11 RX ORDER — MIDAZOLAM HYDROCHLORIDE 1 MG/ML
1 INJECTION INTRAMUSCULAR; INTRAVENOUS
Status: DISCONTINUED | OUTPATIENT
Start: 2018-12-11 | End: 2018-12-11 | Stop reason: HOSPADM

## 2018-12-11 RX ORDER — MEPERIDINE HYDROCHLORIDE 25 MG/ML
12.5 INJECTION INTRAMUSCULAR; INTRAVENOUS; SUBCUTANEOUS
Status: DISCONTINUED | OUTPATIENT
Start: 2018-12-11 | End: 2018-12-11 | Stop reason: HOSPADM

## 2018-12-11 RX ORDER — SODIUM CHLORIDE 0.9 % (FLUSH) 0.9 %
10 SYRINGE (ML) INJECTION AS NEEDED
Status: DISCONTINUED | OUTPATIENT
Start: 2018-12-11 | End: 2018-12-13 | Stop reason: HOSPADM

## 2018-12-11 RX ORDER — PROPOFOL 10 MG/ML
VIAL (ML) INTRAVENOUS AS NEEDED
Status: DISCONTINUED | OUTPATIENT
Start: 2018-12-11 | End: 2018-12-11 | Stop reason: SURG

## 2018-12-11 RX ORDER — FENTANYL CITRATE 50 UG/ML
25 INJECTION, SOLUTION INTRAMUSCULAR; INTRAVENOUS AS NEEDED
Status: DISCONTINUED | OUTPATIENT
Start: 2018-12-11 | End: 2018-12-11 | Stop reason: HOSPADM

## 2018-12-11 RX ORDER — SODIUM CHLORIDE 0.9 % (FLUSH) 0.9 %
10 SYRINGE (ML) INJECTION EVERY 12 HOURS SCHEDULED
Status: DISCONTINUED | OUTPATIENT
Start: 2018-12-11 | End: 2018-12-13 | Stop reason: HOSPADM

## 2018-12-11 RX ORDER — LIDOCAINE HYDROCHLORIDE 10 MG/ML
INJECTION, SOLUTION INFILTRATION; PERINEURAL AS NEEDED
Status: DISCONTINUED | OUTPATIENT
Start: 2018-12-11 | End: 2018-12-11 | Stop reason: HOSPADM

## 2018-12-11 RX ORDER — HYDRALAZINE HYDROCHLORIDE 20 MG/ML
5 INJECTION INTRAMUSCULAR; INTRAVENOUS
Status: DISCONTINUED | OUTPATIENT
Start: 2018-12-11 | End: 2018-12-11 | Stop reason: HOSPADM

## 2018-12-11 RX ORDER — METOCLOPRAMIDE HYDROCHLORIDE 5 MG/ML
5 INJECTION INTRAMUSCULAR; INTRAVENOUS
Status: DISCONTINUED | OUTPATIENT
Start: 2018-12-11 | End: 2018-12-11 | Stop reason: HOSPADM

## 2018-12-11 RX ORDER — ACETAMINOPHEN 500 MG
1000 TABLET ORAL ONCE
Status: COMPLETED | OUTPATIENT
Start: 2018-12-11 | End: 2018-12-11

## 2018-12-11 RX ORDER — SODIUM CHLORIDE 0.9 % (FLUSH) 0.9 %
20 SYRINGE (ML) INJECTION AS NEEDED
Status: DISCONTINUED | OUTPATIENT
Start: 2018-12-11 | End: 2018-12-13 | Stop reason: HOSPADM

## 2018-12-11 RX ORDER — FENTANYL CITRATE 50 UG/ML
25 INJECTION, SOLUTION INTRAMUSCULAR; INTRAVENOUS
Status: DISCONTINUED | OUTPATIENT
Start: 2018-12-11 | End: 2018-12-11 | Stop reason: HOSPADM

## 2018-12-11 RX ORDER — IPRATROPIUM BROMIDE AND ALBUTEROL SULFATE 2.5; .5 MG/3ML; MG/3ML
3 SOLUTION RESPIRATORY (INHALATION) ONCE AS NEEDED
Status: DISCONTINUED | OUTPATIENT
Start: 2018-12-11 | End: 2018-12-11 | Stop reason: HOSPADM

## 2018-12-11 RX ORDER — ONDANSETRON 2 MG/ML
4 INJECTION INTRAMUSCULAR; INTRAVENOUS AS NEEDED
Status: DISCONTINUED | OUTPATIENT
Start: 2018-12-11 | End: 2018-12-11 | Stop reason: HOSPADM

## 2018-12-11 RX ORDER — MIDAZOLAM HYDROCHLORIDE 1 MG/ML
2 INJECTION INTRAMUSCULAR; INTRAVENOUS
Status: DISCONTINUED | OUTPATIENT
Start: 2018-12-11 | End: 2018-12-11 | Stop reason: HOSPADM

## 2018-12-11 RX ORDER — SODIUM CHLORIDE 0.9 % (FLUSH) 0.9 %
1-10 SYRINGE (ML) INJECTION AS NEEDED
Status: DISCONTINUED | OUTPATIENT
Start: 2018-12-11 | End: 2018-12-11 | Stop reason: HOSPADM

## 2018-12-11 RX ORDER — LABETALOL HYDROCHLORIDE 5 MG/ML
5 INJECTION, SOLUTION INTRAVENOUS
Status: DISCONTINUED | OUTPATIENT
Start: 2018-12-11 | End: 2018-12-11 | Stop reason: HOSPADM

## 2018-12-11 RX ORDER — MORPHINE SULFATE 2 MG/ML
2 INJECTION, SOLUTION INTRAMUSCULAR; INTRAVENOUS
Status: DISCONTINUED | OUTPATIENT
Start: 2018-12-11 | End: 2018-12-11 | Stop reason: HOSPADM

## 2018-12-11 RX ORDER — CEFAZOLIN SODIUM 1 G/3ML
INJECTION, POWDER, FOR SOLUTION INTRAMUSCULAR; INTRAVENOUS AS NEEDED
Status: DISCONTINUED | OUTPATIENT
Start: 2018-12-11 | End: 2018-12-11 | Stop reason: SURG

## 2018-12-11 RX ADMIN — MORPHINE SULFATE 2 MG: 2 INJECTION, SOLUTION INTRAMUSCULAR; INTRAVENOUS at 18:17

## 2018-12-11 RX ADMIN — METOCLOPRAMIDE 5 MG: 5 INJECTION, SOLUTION INTRAMUSCULAR; INTRAVENOUS at 17:37

## 2018-12-11 RX ADMIN — OXYCODONE HYDROCHLORIDE AND ACETAMINOPHEN 1 TABLET: 7.5; 325 TABLET ORAL at 21:28

## 2018-12-11 RX ADMIN — PROPOFOL 150 MG: 10 INJECTION, EMULSION INTRAVENOUS at 16:48

## 2018-12-11 RX ADMIN — ONDANSETRON 4 MG: 2 INJECTION INTRAMUSCULAR; INTRAVENOUS at 17:23

## 2018-12-11 RX ADMIN — FENTANYL CITRATE 25 MCG: 50 INJECTION INTRAMUSCULAR; INTRAVENOUS at 16:25

## 2018-12-11 RX ADMIN — OXYCODONE HYDROCHLORIDE AND ACETAMINOPHEN 1 TABLET: 7.5; 325 TABLET ORAL at 08:27

## 2018-12-11 RX ADMIN — SODIUM CHLORIDE 75 ML/HR: 9 INJECTION, SOLUTION INTRAVENOUS at 10:47

## 2018-12-11 RX ADMIN — PROPOFOL 150 MG: 10 INJECTION, EMULSION INTRAVENOUS at 16:50

## 2018-12-11 RX ADMIN — MORPHINE SULFATE 2 MG: 2 INJECTION, SOLUTION INTRAMUSCULAR; INTRAVENOUS at 14:10

## 2018-12-11 RX ADMIN — PROPOFOL 150 MG: 10 INJECTION, EMULSION INTRAVENOUS at 16:42

## 2018-12-11 RX ADMIN — LIDOCAINE 1 PATCH: 50 PATCH CUTANEOUS at 09:09

## 2018-12-11 RX ADMIN — SODIUM CHLORIDE, PRESERVATIVE FREE 3 ML: 5 INJECTION INTRAVENOUS at 21:30

## 2018-12-11 RX ADMIN — SODIUM CHLORIDE, POTASSIUM CHLORIDE, SODIUM LACTATE AND CALCIUM CHLORIDE 100 ML/HR: 600; 310; 30; 20 INJECTION, SOLUTION INTRAVENOUS at 15:04

## 2018-12-11 RX ADMIN — OXYCODONE HYDROCHLORIDE AND ACETAMINOPHEN 1 TABLET: 7.5; 325 TABLET ORAL at 04:06

## 2018-12-11 RX ADMIN — SODIUM CHLORIDE, PRESERVATIVE FREE 3 ML: 5 INJECTION INTRAVENOUS at 17:51

## 2018-12-11 RX ADMIN — GADOBENATE DIMEGLUMINE 10 ML: 529 INJECTION, SOLUTION INTRAVENOUS at 14:30

## 2018-12-11 RX ADMIN — SODIUM CHLORIDE, PRESERVATIVE FREE 10 ML: 5 INJECTION INTRAVENOUS at 21:29

## 2018-12-11 RX ADMIN — ACETAMINOPHEN 1000 MG: 500 TABLET ORAL at 15:04

## 2018-12-11 RX ADMIN — DEXAMETHASONE 4 MG: 4 TABLET ORAL at 21:29

## 2018-12-11 RX ADMIN — CEFAZOLIN 1 G: 1 INJECTION, POWDER, FOR SOLUTION INTRAVENOUS at 16:41

## 2018-12-11 RX ADMIN — CARISOPRODOL 350 MG: 350 TABLET ORAL at 18:37

## 2018-12-11 RX ADMIN — FENTANYL CITRATE 25 MCG: 50 INJECTION INTRAMUSCULAR; INTRAVENOUS at 16:17

## 2018-12-11 RX ADMIN — ALPRAZOLAM 0.5 MG: 0.5 TABLET ORAL at 12:15

## 2018-12-11 RX ADMIN — MIDAZOLAM HYDROCHLORIDE 2 MG: 1 INJECTION, SOLUTION INTRAMUSCULAR; INTRAVENOUS at 15:04

## 2018-12-11 RX ADMIN — DEXAMETHASONE 4 MG: 4 TABLET ORAL at 09:11

## 2018-12-11 RX ADMIN — CEFAZOLIN SODIUM 2 G: 10 INJECTION, POWDER, FOR SOLUTION INTRAVENOUS at 09:10

## 2018-12-11 RX ADMIN — FENTANYL CITRATE 50 MCG: 50 INJECTION INTRAMUSCULAR; INTRAVENOUS at 16:30

## 2018-12-11 RX ADMIN — PROPOFOL 150 MG: 10 INJECTION, EMULSION INTRAVENOUS at 16:55

## 2018-12-11 NOTE — THERAPY EVALUATION
Acute Care - Occupational Therapy Initial Evaluation  [facility]     Patient Name: Kasey Rios  : 1950  MRN: [id number]  Today's Date: 2018  Onset of Illness/Injury or Date of Surgery: 18  Date of Referral to OT: 12/10/18  Referring Physician: Dr. Benitez    Admit Date: 2018       ICD-10-CM ICD-9-CM   1. 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 Malignancy (CMS/HCC) C80.1 199.1   3. 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 Vulvar lesion N90.89 624.8   5. Impaired functional mobility, balance, gait, and endurance Z74.09 V49.89   6. 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)     Past Medical History:   Diagnosis Date   • Hypertension      Past Surgical History:   Procedure Laterality Date   • APPENDECTOMY     • ECTOPIC PREGNANCY      Ruptured ectopic   • TOTAL ABDOMINAL HYSTERECTOMY WITH SALPINGO OOPHORECTOMY      DUB, Fibroids, benign          OT ASSESSMENT FLOWSHEET (last 72 hours)      Occupational Therapy Evaluation     Row Name 18 1100                   OT Evaluation Time/Intention    Subjective Information  complains of;pain  -CH        Document Type  evaluation  -        Mode of Treatment  occupational therapy  -        Comment  eval performed 1130  -           General Information    Patient Profile Reviewed?  yes  -        Onset of Illness/Injury or Date of Surgery  18  -        Referring Physician  Dr. Benitez   -        Patient Observations  alert;cooperative;agree to therapy  -        General Observations of Patient  fowlers, family present, IVs, O2 per NC  -        Prior Level of Function  independent:;all  household mobility;community mobility;ADL's;home management;cooking;cleaning pt. reports significant decline over past 2 weeks  -        Pertinent History of Current Functional Problem   L3 bilateral laminectomy resec L3 bilateral laminectomy resection of epidural tumortion of epidural tumor  -        Existing Precautions/Restrictions  fall;spinal  -        Risks Reviewed  patient and family:;LOB;increased discomfort  -        Benefits Reviewed  patient and family:;improve function;increase independence;increase strength;increase balance  -           Relationship/Environment    Lives With  child(jessica), adult;grandchild(jessica) children stay with pt.   -           Resource/Environmental Concerns    Current Living Arrangements  home/apartment/condo  -           Home Main Entrance    Number of Stairs, Main Entrance  three  -        Stair Railings, Main Entrance  railing on right side (ascending)  -           Cognitive Assessment/Interventions    Additional Documentation  Cognitive Assessment/Intervention (Group)  -           Cognitive Assessment/Intervention- PT/OT    Affect/Mental Status (Cognitive)  WNL  -CH        Orientation Status (Cognition)  oriented x 4  -CH        Follows Commands (Cognition)  WNL  -        Personal Safety Interventions  fall prevention program maintained;gait belt;muscle strengthening facilitated;nonskid shoes/slippers when out of bed;supervised activity  -           Safety Issues, Functional Mobility    Impairments Affecting Function (Mobility)  endurance/activity tolerance;balance;shortness of breath  -           Bed Mobility Assessment/Treatment    Bed Mobility Assessment/Treatment  sit-supine;supine-sit  -        Supine-Sit [place] (Bed Mobility)  conditional independence  -        Sit-Supine [place] (Bed Mobility)  conditional independence  -           Functional Mobility    Functional Mobility- Ind. Level  contact guard assist A  -         Functional Mobility- Comment  in room & hallway, decreased pace  -           Transfer Assessment/Treatment    Transfer Assessment/Treatment  sit-stand transfer;stand-sit transfer  -           Sit-Stand Transfer    Sit-Stand [place] (Transfers)  contact guard;verbal cues  -           Stand-Sit Transfer    Stand-Sit [place] (Transfers)  supervision  -           ADL Assessment/Intervention    BADL Assessment/Intervention  lower body dressing  -           Lower Body Dressing Assessment/Training    Lower Body Dressing [place] Level  set up;don;socks  -        Lower Body Dressing Position  edge of bed sitting  -           BADL Safety/Performance    Impairments, BADL Safety/Performance  pain  -           General ROM    GENERAL ROM COMMENTS  all extremities AROM WFL  -           MMT (Manual Muscle Testing)    General MMT Comments  BUE 5/5  -CH           Sensory Assessment/Intervention    Sensory General Assessment  no sensation deficits identified  -           Positioning and Restraints    Pre-Treatment Position  in bed  -        Post Treatment Position  bed  -        In Bed  fowlers;call light within reach;encouraged to call for assist;with family/caregiver;side rails up x2  -CH           Pain Scale: Numbers Pre/Post-Treatment    Pain Scale: Numbers, Pretreatment  5/10  -CH        Pain Scale: Numbers, Post-Treatment  5/10  -CH        Pain Location  back  -CH           Pain Scale 2: Numbers Pre/Post-Treatment    Pain Scale 2: Numbers, Pretreatment  8/10  -CH        Pain Scale 2: Numbers, Post-Treatment  8/10  -CH        Pain Location 2  -- labial surgical site  -           Wound 12/08/18 1600 Right labia other (see comments)    Wound - Properties Group Date first assessed: 12/08/18  -SK Time first assessed: 1600  -SK Present On Admission : yes  -SK Side: Right  -SK Location: labia  -SK Type: other (see comments)  -SK       Wound 12/10/18 1739 Other (See comments) other (see notes)  incision    Wound - Properties Group Date first assessed: 12/10/18  -HUGO Time first assessed: 1739  -HUGO Side: Other (See comments)  -HUGO Location: other (see notes)  -HUGO Type: incision  -HUGO       Wound 12/10/18 1739 Other (See comments) back incision    Wound - Properties Group Date first assessed: 12/10/18  -HUGO Time first assessed: 1739  -HUGO Side: Other (See comments)  -HUGO Location: back  -HUGO Type: incision  -HUGO       Coping    Observed Emotional State  accepting;cooperative  -        Verbalized Emotional State  acceptance  -           Plan of Care Review    Plan of Care Reviewed With  patient  -CH           Clinical Impression (OT)    Date of Referral to OT  12/10/18  -        OT Diagnosis  decline in ADLs  -        Patient/Family Goals Statement (OT Eval)  reduce pain, improve independence  -        Criteria for Skilled Therapeutic Interventions Met (OT Eval)  yes;treatment indicated  -        Therapy Frequency (OT Eval)  3 times/wk  -        Predicted Duration of Therapy Intervention (Therapy Eval)  until DC from this facility  -        Care Plan Review (OT)  evaluation/treatment results reviewed;care plan/treatment goals reviewed;risks/benefits reviewed;current/potential barriers reviewed;patient/other agree to care plan  -        Anticipated Discharge Disposition (OT)  home with assist  -           Planned OT Interventions    Planned Therapy Interventions (OT Eval)  activity tolerance training;BADL retraining;functional balance retraining;occupation/activity based interventions;patient/caregiver education/training;transfer/mobility retraining  -           OT Goals    Bathing Goal Selection (OT)  bathing, OT goal 1  -        Dressing Goal Selection (OT)  dressing, OT goal 1  -           Bathing Goal 1 (OT)    Activity/Assistive Device (Bathing Goal 1, OT)  bathing skills, all  -        [place] Level/Cues Needed (Bathing Goal 1, OT)  set-up required  -        Time Frame (Bathing  Goal 1, OT)  long term goal (LTG);by discharge  -CH        Barriers (Bathing Goal 1, OT)  .  -CH        Progress/Outcomes (Bathing Goal 1, OT)  goal ongoing  -CH           Dressing Goal 1 (OT)    Activity/Assistive Device (Dressing Goal 1, OT)  dressing skills, all  -CH        [place]/Cues Needed (Dressing Goal 1, OT)  independent  -CH        Time Frame (Dressing Goal 1, OT)  long term goal (LTG);by discharge  -CH        Barriers (Dressing Goal 1, OT)  .  -CH        Progress/Outcome (Dressing Goal 1, OT)  goal ongoing  -CH           Living Environment    Home Accessibility  stairs to enter home;tub/shower is not walk in  -          User Key  (r) = Recorded By, (t) = Taken By, (c) = Cosigned By    Initials Name Effective Dates    CH Daisha Rivas, OTR/L 08/02/16 -     Hodan Acuna RN 08/02/16 -     Henrietta Falcon RN 08/02/16 -                OT Recommendation and Plan  Outcome Summary/Treatment Plan (OT)  Anticipated Discharge Disposition (OT): home with assist  Planned Therapy Interventions (OT Eval): activity tolerance training, BADL retraining, functional balance retraining, occupation/activity based interventions, patient/caregiver education/training, transfer/mobility retraining  Therapy Frequency (OT Eval): 3 times/wk  Plan of Care Review  Plan of Care Reviewed With: patient  Plan of Care Reviewed With: patient  Outcome Summary: OT eval completed.  Pt. performed LBD at S & required CGA for t/f & ambulation.  Pt. is experiencing mod levels of pain which may impact her ability to perform ADLs at max independence.  She would benefit from cont'd OT tx to improve her indpe & self care & fxl mobility.  Anticipate DC home with family.    Outcome Measures     Row Name 12/11/18 1131 12/11/18 1130          How much help from another person do you currently need...    Turning from your back to your side while in flat bed without using bedrails?  4  -MS  --     Moving from lying on back to sitting on  the side of a flat bed without bedrails?  3  -MS  --     Moving to and from a bed to a chair (including a wheelchair)?  3  -MS  --     Standing up from a chair using your arms (e.g., wheelchair, bedside chair)?  3  -MS  --     Climbing 3-5 steps with a railing?  2  -MS  --     To walk in hospital room?  3  -MS  --     AM-PAC 6 Clicks Score  18  -MS  --        How much help from another is currently needed...    Putting on and taking off regular lower body clothing?  --  3  -CH     Bathing (including washing, rinsing, and drying)  --  3  -CH     Toileting (which includes using toilet bed pan or urinal)  --  3  -CH     Putting on and taking off regular upper body clothing  --  4  -CH     Taking care of personal grooming (such as brushing teeth)  --  4  -CH     Eating meals  --  4  -CH     Score  --  21  -CH        Functional Assessment    Outcome Measure Options  AM-PAC 6 Clicks Basic Mobility (PT)  -MS  AM-PAC 6 Clicks Daily Activity (OT)  -       User Key  (r) = Recorded By, (t) = Taken By, (c) = Cosigned By    Initials Name Provider Type     Daisha Rivas, OTR/L Occupational Therapist    Nicole Hurtado, PT, DPT, NCS Physical Therapist          Time Calculation:   Time Calculation- OT     Row Name 12/11/18 1130             Time Calculation- OT    OT Start Time  1130  -      OT Stop Time  1158  -      OT Time Calculation (min)  28 min  -      OT Received On  12/11/18  -      OT Goal Re-Cert Due Date  12/21/18  -        User Key  (r) = Recorded By, (t) = Taken By, (c) = Cosigned By    Initials Name Provider Type     Daisha Rivas, OTR/L Occupational Therapist        Therapy Suggested Charges     Code   Minutes Charges    None           Therapy Charges for Today     Code Description Service Date Service Provider Modifiers Qty    19248313143 HC OT SELFCARE CURRENT 12/11/2018 Daisha Rivas OTR/L GO, CJ 1    50550554049 HC OT SELFCARE PROJECTED 12/11/2018 Daisha Rivas OTR/MARINA GO, CI 1     [id number]  OT EVAL LOW COMPLEXITY 2 12/11/2018 Daisha Rivas OTR/L GO, KX 1          OT G-codes  OT Professional Judgement Used?: Yes  OT Functional Scales Options: AM-PAC 6 Clicks Daily Activity (OT)  Score: 21  Functional Limitation: Self care  Self Care Current Status (): At least 20 percent but less than 40 percent impaired, limited or restricted  Self Care Goal Status (): At least 1 percent but less than 20 percent impaired, limited or restricted    MERY Robles/MARINA  12/11/2018

## 2018-12-11 NOTE — PLAN OF CARE
Problem: Patient Care Overview  Goal: Plan of Care Review  Outcome: Ongoing (interventions implemented as appropriate)   12/11/18 1130   Coping/Psychosocial   Plan of Care Reviewed With patient   Plan of Care Review   Progress no change   OTHER   Outcome Summary OT eval completed. Pt. performed LBD at S & required CGA for t/f & ambulation. Pt. is experiencing mod levels of pain which may impact her ability to perform ADLs at max independence. She would benefit from cont'd OT tx to improve her indpe & self care & fxl mobility. Anticipate DC home with family.

## 2018-12-11 NOTE — ANESTHESIA POSTPROCEDURE EVALUATION
"Patient: Kasey Rios    Procedure Summary     Date:  12/11/18 Room / Location:   [facility] OR  /  [facility]    Anesthesia Start:  1641 Anesthesia Stop:  1715    Procedure:  INSERTION VENOUS ACCESS DEVICE (N/A Chin to Nipples) Diagnosis:      Surgeon:  Ellie Griffith MD Provider:  Henok Puga CRNA    Anesthesia Type:  general ASA Status:  3          Anesthesia Type: general  Last vitals  BP   157/97 (12/11/18 1203)   Temp   98.9 °F (37.2 °C) (12/11/18 1203)   Pulse   91 (12/11/18 1446)   Resp   16 (12/11/18 1446)     SpO2   98 % (12/11/18 1446)     Post Anesthesia Care and Evaluation    Patient location during evaluation: PACU  Patient participation: complete - patient participated  Level of consciousness: awake and alert  Pain management: adequate  Airway patency: patent  Anesthetic complications: No anesthetic complications    Cardiovascular status: acceptable  Respiratory status: acceptable  Hydration status: acceptable    Comments: Blood pressure 157/97, pulse 91, temperature 98.9 °F (37.2 °C), temperature source Oral, resp. rate 16, height 152.4 cm (60\"), weight 62.3 kg (137 lb 6.4 oz), SpO2 98 %.    Pt discharged from PACU based on chaz score >8      "

## 2018-12-11 NOTE — PLAN OF CARE
Problem: Patient Care Overview  Goal: Plan of Care Review  Outcome: Ongoing (interventions implemented as appropriate)   12/11/18 1224   Coping/Psychosocial   Plan of Care Reviewed With patient   Plan of Care Review   Progress improving   OTHER   Outcome Summary PT evaluation completed. The patient has full strength in her legs now and the pain in her right leg is gone. .However when walking, her right leg became fatigued. The patient is also on supplemental O2 which is new for her since admission. PT will continue to work with the patient to decrease her back pain, increase her endurance, and improve her O2 sat with activity.Home with assist is recommended upon discharge. .

## 2018-12-11 NOTE — H&P
Ellie Griffith MD  H&P    Patient Care Team:  Provider, No Known as PCP - General    Chief complaint cancer    Subjective     Kasey Rios  is a 68 y.o. female presents with cancer needs a port for chemotherapy.      Review of Systems   Pertinent items are noted in HPI, all other systems reviewed and negative    History  Past Medical History:   Diagnosis Date   • Hypertension      Past Surgical History:   Procedure Laterality Date   • APPENDECTOMY     • ECTOPIC PREGNANCY  1975    Ruptured ectopic   • TOTAL ABDOMINAL HYSTERECTOMY WITH SALPINGO OOPHORECTOMY  1978    DUB, Fibroids, benign     History reviewed. No pertinent family history.  Social History     Tobacco Use   • Smoking status: Current Every Day Smoker     Types: Cigarettes   Substance Use Topics   • Alcohol use: No     Frequency: Never   • Drug use: No     Medications Prior to Admission   Medication Sig Dispense Refill Last Dose   • aspirin 81 MG chewable tablet Chew 81 mg Daily.   Past Week at Unknown time   • cyclobenzaprine (FLEXERIL) 10 MG tablet Take 1 tablet by mouth Every 8 (Eight) Hours As Needed for Muscle Spasms. 10 tablet 0 Past Week at Unknown time   • ibuprofen (ADVIL,MOTRIN) 600 MG tablet Take 1 tablet by mouth Every 8 (Eight) Hours As Needed for Moderate Pain . 30 tablet 0 Past Week at Unknown time   • predniSONE (DELTASONE) 20 MG tablet Take 1 tablet by mouth 2 (Two) Times a Day. 10 tablet 0 Past Week at Unknown time     Allergies:  Patient has no known allergies.    Objective     Vital Signs  Temp:  [96.7 °F (35.9 °C)-98.9 °F (37.2 °C)] 98.9 °F (37.2 °C)  Heart Rate:  [] 91  Resp:  [10-22] 16  BP: (152-177)/(59-98) 157/97    Physical Exam:      General Appearance:    Alert, cooperative, in no acute distress   Head:    Normocephalic, without obvious abnormality, atraumatic   Eyes:            Lids and lashes normal, conjunctivae and sclerae normal, no   icterus, no pallor, corneas clear, PERRLA   Ears:    Ears appear intact with no  abnormalities noted   Neck:   No adenopathy, supple, trachea midline   Back:     No kyphosis present, no scoliosis present, no skin lesions,      erythema or scars, no tenderness to percussion or                   palpation,   range of motion normal   Lungs:     Clear to auscultation,respirations regular, even and                  unlabored    Heart:    Regular rhythm and normal rate, normal S1 and S2, no            murmur, no gallop, no rub, no click   Chest Wall:    No abnormalities observed   Abdomen:   Soft    Rectal:     Deferred   Extremities:   Moves all extremities well, no edema, no cyanosis, no             redness   Pulses:   Pulses palpable and equal bilaterally   Skin:   No bleeding, bruising or rash   Lymph nodes:   No palpable adenopathy   Neurologic:   No focal deficits       Results Review:      Lab Results (last 72 hours)     Procedure Component Value Units Date/Time    Tissue Pathology Exam [[id number]] Collected:  12/10/18 1702    Specimen:  Tissue from Spine, Lumbar Updated:  12/11/18 1222    Tissue Pathology Exam [[id number]] Collected:  12/10/18 1659    Specimen:  Tissue from Labia, Tissue from Labia, Tissue from Labia Updated:  12/11/18 1220    Vitamin B12 [[id number]]  (Abnormal) Collected:  12/10/18 0906    Specimen:  Blood Updated:  12/11/18 1000     Vitamin B-12 214 pg/mL     Folate [[id number]] Collected:  12/10/18 0906    Specimen:  Blood Updated:  12/11/18 1000     Folate 5.02 ng/mL     Ferritin [[id number]]  (Normal) Collected:  12/10/18 0906    Specimen:  Blood Updated:  12/11/18 0930     Ferritin 106.00 ng/mL     Basic Metabolic Panel [[id number]]  (Abnormal) Collected:  12/11/18 0549    Specimen:  Blood Updated:  12/11/18 0631     Glucose 87 mg/dL      BUN 11 mg/dL      Creatinine 0.59 mg/dL      Sodium 133 mmol/L      Potassium 4.0 mmol/L      Chloride 96 mmol/L      CO2 29.0 mmol/L      Calcium 8.6 mg/dL      eGFR Non African Amer 101 mL/min/1.73      BUN/Creatinine Ratio 18.6     Anion  Gap 8.0 mmol/L     Narrative:       GFR Normal >60  Chronic Kidney Disease <60  Kidney Failure <15    CBC & Differential [[id number]] Collected:  12/11/18 0549    Specimen:  Blood Updated:  12/11/18 0621    Narrative:       The following orders were created for panel order CBC & Differential.  Procedure                               Abnormality         Status                     ---------                               -----------         ------                     CBC Auto Differential[[id number]]        Abnormal            Final result                 Please view results for these tests on the individual orders.    CBC Auto Differential [[id number]]  (Abnormal) Collected:  12/11/18 0549    Specimen:  Blood Updated:  12/11/18 0621     WBC 13.81 10*3/mm3      RBC 4.03 10*6/mm3      Hemoglobin 11.7 g/dL      Hematocrit 34.8 %      MCV 86.4 fL      MCH 29.0 pg      MCHC 33.6 g/dL      RDW 14.1 %      RDW-SD 44.3 fl      MPV 9.6 fL      Platelets 444 10*3/mm3      Neutrophil % 68.2 %      Lymphocyte % 19.3 %      Monocyte % 10.6 %      Eosinophil % 1.2 %      Basophil % 0.2 %      Immature Grans % 0.5 %      Neutrophils, Absolute 9.41 10*3/mm3      Lymphocytes, Absolute 2.66 10*3/mm3      Monocytes, Absolute 1.47 10*3/mm3      Eosinophils, Absolute 0.17 10*3/mm3      Basophils, Absolute 0.03 10*3/mm3      Immature Grans, Absolute 0.07 10*3/mm3      nRBC 0.0 /100 WBC     CEA [[id number]]  (Abnormal) Collected:  12/10/18 0906    Specimen:  Blood Updated:  12/10/18 1043     CEA 11.60 ng/mL     Urine Culture - Urine, [[id number]]  (Abnormal) Collected:  12/08/18 2319    Specimen:  Urine Updated:  12/10/18 0623     Urine Culture >100,000 CFU/mL Mixed Gram Positive Sandra    Narrative:       Probable contaminant    Urinalysis With Culture If Indicated - [[id number]]  (Abnormal) Collected:  12/08/18 2319    Specimen:  Urine Updated:  12/08/18 2344     Color, UA Yellow     Appearance, UA Clear     pH, UA 7.0     Specific [place], UA  1.021     Glucose, UA Negative     Ketones, UA Negative     Bilirubin, UA Negative     Blood, UA Small (1+)     Protein, UA Negative     Leuk Esterase, UA Large (3+)     Nitrite, UA Negative     Urobilinogen, UA 0.2 E.U./dL    Urinalysis, Microscopic Only - Urine, Clean Catch [[id number]]  (Abnormal) Collected:  12/08/18 2319    Specimen:  Urine, Clean Catch Updated:  12/08/18 2344     RBC, UA 6-12 /HPF      WBC, UA 31-50 /HPF      Bacteria, UA None Seen /HPF      Squamous Epithelial Cells, UA None Seen /HPF      Hyaline Casts, UA None Seen /LPF      Methodology Automated Microscopy        Imaging Results (last 72 hours)     Procedure Component Value Units Date/Time    MRI Brain With & Without Contrast [[id number]] Updated:  12/11/18 1523    XR Spine Lumbar AP & Lateral [[id number]] Collected:  12/10/18 1753     Updated:  12/11/18 0716    Narrative:       INTRAOPERATIVE FLUOROSCOPIC GUIDANCE 12/10/2018      INDICATION: Intraoperative fluoroscopic guidance. Kyphoplasty      TECHNIQUE: 2 fluoroscopic images from the operating room were submitted  for evaluation. Please note, no radiologist was in attendance for  acquisition of these images. These images are available for future  reference to the attending surgeon. Total fluoroscopic time was 1.3  minutes.      FINDINGS: Intraoperative images related to lumbar kyphoplasty.        Impression:       1. Intraoperative fluoroscopic guidance as described.   2. Please refer to real-time fluoroscopy and operative report for full  details.  This report was finalized on 12/10/2018 17:55 by Dr. Rosaline Lechuga MD.    FL C Arm During Surgery [[id number]] Collected:  12/10/18 1753     Updated:  12/11/18 0716    Narrative:       INTRAOPERATIVE FLUOROSCOPIC GUIDANCE 12/10/2018      INDICATION: Intraoperative fluoroscopic guidance. Kyphoplasty      TECHNIQUE: 2 fluoroscopic images from the operating room were submitted  for evaluation. Please note, no radiologist was in attendance  for  acquisition of these images. These images are available for future  reference to the attending surgeon. Total fluoroscopic time was 1.3  minutes.      FINDINGS: Intraoperative images related to lumbar kyphoplasty.        Impression:       1. Intraoperative fluoroscopic guidance as described.   2. Please refer to real-time fluoroscopy and operative report for full  details.  This report was finalized on 12/10/2018 17:55 by Dr. Rosaline Lechuga MD.    NM Bone Scan Whole Body [[id number]] Collected:  12/10/18 1425     Updated:  12/10/18 1439    Narrative:       HISTORY: Large adrenal masses. Left lower lobe mass lesion with  intrathoracic lymphadenopathy.     Nuclear medicine bone scan: Following IV injection of 20.8 mCi of  technetium 99m HDP, delayed anterior and posterior whole body images are  obtained.     No intense abnormal tracer uptake is identified within the L3 vertebra  which suggests no osteoblastic activity at this site. There is arthritic  uptake noted within the shoulders and knees ankles and feet.  Asymmetrical increased tracer uptake is noted within the left glenoid  which could be degenerative as the arthritic changes are more prominent  on the left compared to the right with the CT chest exam of 12/8/2018.     Physiologic activity seen within the kidneys and the bladder.        Impression:       .   1. Only minimal tracer activity identified within the L3 vertebra on the  anterior images. The lack of intense uptake at the site of the L3  vertebral fracture indicates no significant osteoblastic activity at  this level.  2. Asymmetrical increased tracer activity of the left shoulder/glenoid  may be degenerative. Further assessment with MRI could be performed if  the presence of metastasis at this site would alter management.  This report was finalized on 12/10/2018 14:36 by Dr. Michelle Naranjo MD.    CT Abdomen Pelvis With Contrast [[id number]] Collected:  12/08/18 1643     Updated:  12/08/18 1657     Narrative:       CT ABDOMEN PELVIS W CONTRAST- 12/8/2018 3:58 PM CST     HISTORY: Neoplasm: abdomen, other primary, suspected;  M84.58XA-Pathological fracture in neoplastic disease, other specified  site, initial encounter for fracture; C80.1-Malignant (primary)  neoplasm, unspecified       COMPARISON: None.      DLP: 530 mGy cm. Automated exposure control was utilized to diminish  patient radiation dose.     TECHNIQUE: Following the oral ingestion and intravenous administration  of contrast, helical CT tomographic images of the abdomen and pelvis  were acquired. Coronal reformatted images were also provided for review.        FINDINGS:   There is a large lobular neoplastic mass within the left lower lobe.  There is associated studding of the pleura and major fissure on the  left. A paraspinal mass is also present at the level of the lesion and  the lesion is contiguous with the left infrahilar structures with left  hilar and infrahilar lymphadenopathy. There is also a middle mediastinal  cristi mass inseparable from the distal esophagus and I'm concerned that  there is most likely involvement with the esophagus from this component  of the neoplasm. The right lung bases clear. There is no pleural  effusion..      LIVER: No focal liver lesion. The hepatic vasculature is patent.      BILIARY SYSTEM: The gallbladder is unremarkable. No intrahepatic or  extrahepatic ductal dilatation.      PANCREAS: No focal pancreatic lesion.      SPLEEN: Unremarkable.      KIDNEYS AND ADRENALS: Bilateral necrotic adrenal metastases are present.  These were further discussed in the CT of the chest report. Both of  these measure more than 6 cm in size set. There is an ill-defined  hypodensity associated with the posterior interpolar aspect of the left  kidney measuring approximately 1.6 cm in size. I suspect that this may  also represent a metastasis to the left kidney. The kidneys otherwise  demonstrate normal enhancement.. The  ureters are decompressed and normal  in appearance.     RETROPERITONEUM: There is ectasia of the infrarenal abdominal aorta. An  enlarged necrotic interaortocaval node is present measuring 2.2 x 2.1 cm  in size. A 10 mm short axis left paraortic node is also present..      GI TRACT: Diverticular disease is noted of the descending and sigmoid  colon without evidence of acute diverticulitis. There is no evidence of  mechanical bowel obstruction.. The appendix is surgically absent..     OTHER: No evidence of a mesenteric mass. Some enlarged periportal nodes  are present including a 12 mm short axis and 13 mm short axis node just  above the main portal vein in the landon hepatis. Some smaller nodes are  noted within the gastrohepatic ligament as well.. The abdominopelvic  vasculature is patent. There is a moderate compression fracture which is  a 2 column fracture involving the L3 vertebral body. Along the posterior  margin of this fracture is a epidural soft tissue mass. The bone is  rather permeative in appearance and I suspect this represents a  pathologic fracture related to bony metastases. A rind of soft tissue  density is also noted adjacent to the vertebral body. No other discrete  lesions are demonstrated.. No free fluid or localized inflammation is  present.      PELVIS: No mass lesion, fluid collection or significant lymphadenopathy  is seen in the pelvis. The patient's undergone prior hysterectomy. The  urinary bladder is unremarkable..       Impression:       1. Dominant mass within the left lower lobe of the lung. There is  associated hilar and middle mediastinal adenopathy. A left paraspinal  cristi mass is also present as well as a satellite lesion and pleural  studding.. Also noted is an enlarged interaortocaval node and some  enlarged periportal nodes. There is a ill-defined hypodense lesion  involving the posterior interpolar aspect of the left kidney suspicious  for metastatic disease. No evidence of  hepatic lesions. Marked  enlargement and heterogeneity is noted of both adrenal glands consistent  with large adrenal metastases. Given the appearance of the lesions and  distribution I suspect this may represent a small cell lung carcinoma. A  borderline enlarged left para-aortic node is also present.  2. Diverticulosis of the descending and sigmoid colon without evidence  of acute diverticulitis. No evidence of mechanical bowel obstruction..   3. Moderate two column fracture at the L3 level with a epidural mass  along the posterior margin of the vertebral body. This vertebral body is  rather permeative in appearance and I suspect this represents a  pathologic fracture related to metastatic disease with epidural  extension.        This report was finalized on 12/08/2018 16:54 by Dr. Tj Paredes MD.    CT Chest With Contrast [[id number]] Collected:  12/08/18 1630     Updated:  12/08/18 1644    Narrative:       CT CHEST W CONTRAST- 12/8/2018 3:58 PM CST     HISTORY: Neoplasm: chest, heme/lymphatic, recurrence, suspected/known;  M84.58XA-Pathological fracture in neoplastic disease, other specified  site, initial encounter for fracture; C80.1-Malignant (primary)  neoplasm, unspecified      COMPARISON: None      DLP: 340 mGy cm. Automated exposure control was utilized to diminish  patient radiation dose.     TECHNIQUE: Serial helical tomographic images of the chest were acquired  following the infusion of Isovue contrast intravenously. Bone and soft  tissue algorithms were provided.       FINDINGS:   Neck base: The imaged portion of the neck and thyroid gland is  unremarkable.      Lungs: There are moderate changes of centrilobular emphysema. There is a  heterogeneously enhancing 5.6 x 5.7 cm lobular mass within the left  lower lobe. A peripheral satellite lesion also noted within left lower  lobe measures 1.3 cm in size. There is irregular nodular thickening of  the major fissure on the left. There is some  postobstructive atelectasis  within the left lower lobe. A pleural-based paraspinal mass is noted  within the medial left lung base measuring 2.2 x 1.5 cm in size. This  lower lobe mass is contiguous along its upper aspect with left hilar and  infrahilar lymphadenopathy. It is also contiguous with a mass posterior  to the left atrium which is inseparable from the esophagus. I suspect  there is most likely some involvement of the distal esophagus with the  neoplasm. The mass at this level measures approximately 3.9 x 2.5 cm in  size. There is a granuloma within the right upper lobe. No discrete  right-sided lung lesion is present.. No pleural effusion is present. The  left lower lobe is truncated and extends directly into the left lower  lobe mass lesion. The tracheobronchial tree are otherwise patent..      Heart: There is mild cardiomegaly. Coronary artery calcifications noted  in the right coronary, LAD and circumflex distributions. No evidence of  pericardial effusion..    .      Vasculature: Aorta is normal in caliber and appearance without  significant atherosclerosis, stenosis, or aneurysm. A prevascular node  is present adjacent to the proximal aortic arch. This measures  approximately 4.9 x 2.9 cm in size. There is atheromatous calcification  with mild stenosis at the origin left common carotid artery left  subclavian artery. The proximal great vessels are otherwise  unremarkable. There is effacement of the distal left main pulmonary  artery along its posterior margin related to the hilar component of the  patient's neoplasm.     Lymph nodes: No additional enlarged axillary or mediastinal nodes are  present..      Bones and soft tissues: No acute osseous or soft tissue abnormality is  seen. There are no worrisome osseous lesions.      Upper abdomen: Bilateral adrenal metastases are present. The left  measures 5.5 x 6.1 and the right measures 6.7 x 5.4 cm in size. A small  hiatal hernia is present. An  enlarged interaortocaval node is present  within the upper retroperitoneum measuring 1.9 x 2.3 cm in size..        Impression:       1. Large mass lesion within the left lower lobe. This is contiguous with  the left inferior hilar structures with associated left perihilar  lymphadenopathy. There is also evidence of metastatic disease to the  middle mediastinum at the level of the more distal esophagus. The  margins of the esophagus are difficult to delineate at this level and I  suspect there is secondary involvement of the esophagus. There is also  an enlarged prevascular node as well as a paraspinal cristi mass at the  level of the left lung base. Bilateral adrenal metastases are present.  There is periportal and interaortocaval adenopathy within the upper  abdomen. Satellite lesions are noted within the left lower lobe. There  is nodular thickening of the major fissure on the left as well  consistent with pleural studding..  2. Coronary calcifications as described above.  3. There is some effacement of the left main pulmonary artery along its  posterior aspect related to the hilar cristi component of the mass..        This report was finalized on 12/08/2018 16:41 by Dr. Tj Paredes MD.          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  The risks of bleeding infection injury to the artery, lung and other structures were discussed      Ellie Griffith MD  12/11/18  3:38 PM

## 2018-12-11 NOTE — PLAN OF CARE
Problem: Skin Injury Risk (Adult)  Goal: Identify Related Risk Factors and Signs and Symptoms  Outcome: Outcome(s) achieved Date Met: 12/11/18    Goal: Skin Health and Integrity  Outcome: Ongoing (interventions implemented as appropriate)      Problem: Patient Care Overview  Goal: Plan of Care Review  Outcome: Ongoing (interventions implemented as appropriate)   12/11/18 0546   Coping/Psychosocial   Plan of Care Reviewed With patient   Plan of Care Review   Progress no change   OTHER   Outcome Summary Kyphoplasty and vulvular bx performed. PRN pain meds available. Continue to monitor      Goal: Individualization and Mutuality  Outcome: Ongoing (interventions implemented as appropriate)    Goal: Discharge Needs Assessment  Outcome: Ongoing (interventions implemented as appropriate)      Problem: Pain, Acute (Adult)  Goal: Identify Related Risk Factors and Signs and Symptoms  Outcome: Outcome(s) achieved Date Met: 12/11/18    Goal: Acceptable Pain Control/Comfort Level  Outcome: Outcome(s) achieved Date Met: 12/11/18      Problem: Fall Risk (Adult)  Goal: Identify Related Risk Factors and Signs and Symptoms  Outcome: Outcome(s) achieved Date Met: 12/11/18    Goal: Absence of Fall  Outcome: Ongoing (interventions implemented as appropriate)      Problem: Surgery Nonspecified (Adult)  Goal: Signs and Symptoms of Listed Potential Problems Will be Absent, Minimized or Managed (Surgery Nonspecified)  Outcome: Ongoing (interventions implemented as appropriate)

## 2018-12-11 NOTE — PROGRESS NOTES
"Kasey Rios  68 y.o.      Chief complaint:   Back and lower extremity pain improved     Subjective  No events    Temp:  [96.7 °F (35.9 °C)-98.2 °F (36.8 °C)] 98.2 °F (36.8 °C)  Heart Rate:  [] 99  Resp:  [10-22] 18  BP: (116-177)/(59-98) 176/87      Objective:  General Appearance:  Comfortable, well-appearing, in no acute distress and in pain.    Vital signs: (most recent): Blood pressure 176/87, pulse 99, temperature 98.2 °F (36.8 °C), temperature source Oral, resp. rate 18, height 152.4 cm (60\"), weight 62.3 kg (137 lb 6.4 oz), SpO2 96 %.  Vital signs are normal.  No fever.    Output: Producing urine.    Lungs:  Normal effort and normal respiratory rate.  She is not in respiratory distress.    Heart: Normal rate.  Regular rhythm.    Chest: Symmetric chest wall expansion.   Skin:  Warm and dry.    Abdomen: Bowel sounds are normal.   There is no abdominal tenderness.     Pulses: Distal pulses are intact.        Neurologic Exam     Mental Status   Oriented to person, place, and time.   Attention: normal. Concentration: normal.   Speech: speech is normal   Level of consciousness: alert    Cranial Nerves   Cranial nerves II through XII intact.     Motor Exam   Muscle bulk: normal    Strength   Strength 5/5 throughout.     Sensory Exam   Light touch normal.         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)    Compression fracture of L3 lumbar vertebra (CMS/HCC)      Lab Results (last 24 hours)     Procedure Component Value Units Date/Time    Basic Metabolic Panel [[id number]]  (Abnormal) Collected:  12/11/18 0549    Specimen:  Blood Updated:  12/11/18 0631     Glucose 87 mg/dL      BUN 11 mg/dL      Creatinine 0.59 mg/dL      Sodium 133 mmol/L      Potassium 4.0 mmol/L      Chloride 96 mmol/L      CO2 29.0 mmol/L      Calcium 8.6 mg/dL      eGFR Non African Amer 101 mL/min/1.73      BUN/Creatinine Ratio 18.6     Anion Gap 8.0 mmol/L     Narrative:       GFR Normal " >60  Chronic Kidney Disease <60  Kidney Failure <15    CBC & Differential [[id number]] Collected:  12/11/18 0549    Specimen:  Blood Updated:  12/11/18 0621    Narrative:       The following orders were created for panel order CBC & Differential.  Procedure                               Abnormality         Status                     ---------                               -----------         ------                     CBC Auto Differential[[id number]]        Abnormal            Final result                 Please view results for these tests on the individual orders.    CBC Auto Differential [[id number]]  (Abnormal) Collected:  12/11/18 0549    Specimen:  Blood Updated:  12/11/18 0621     WBC 13.81 10*3/mm3      RBC 4.03 10*6/mm3      Hemoglobin 11.7 g/dL      Hematocrit 34.8 %      MCV 86.4 fL      MCH 29.0 pg      MCHC 33.6 g/dL      RDW 14.1 %      RDW-SD 44.3 fl      MPV 9.6 fL      Platelets 444 10*3/mm3      Neutrophil % 68.2 %      Lymphocyte % 19.3 %      Monocyte % 10.6 %      Eosinophil % 1.2 %      Basophil % 0.2 %      Immature Grans % 0.5 %      Neutrophils, Absolute 9.41 10*3/mm3      Lymphocytes, Absolute 2.66 10*3/mm3      Monocytes, Absolute 1.47 10*3/mm3      Eosinophils, Absolute 0.17 10*3/mm3      Basophils, Absolute 0.03 10*3/mm3      Immature Grans, Absolute 0.07 10*3/mm3      nRBC 0.0 /100 WBC     CEA [[id number]]  (Abnormal) Collected:  12/10/18 0906    Specimen:  Blood Updated:  12/10/18 1043     CEA 11.60 ng/mL               Plan:   Pathology results pending.  Continue with physical and occupational therapy.  Radiation/oncology to see today.  Consult general surgery for port placement.    Pernell Aguiar, APRN

## 2018-12-11 NOTE — ANESTHESIA POSTPROCEDURE EVALUATION
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) [C79.51, C80.1])    Surgeon:  Erwin Benitez MD; Alonzo Lugo MD Provider:  SOMMER Milan CRNA    Anesthesia Type:  general ASA Status:  3          Anesthesia Type: general  Last vitals  BP   164/87 (12/11/18 0339)   Temp   97.8 °F (36.6 °C) (12/11/18 0339)   Pulse   104 (12/11/18 0339)   Resp   16 (12/11/18 0339)     SpO2   99 % (12/11/18 0339)     Post Anesthesia Care and Evaluation    Patient location during evaluation: PACU  Patient participation: complete - patient participated  Level of consciousness: awake and alert  Pain management: adequate  Airway patency: patent  Anesthetic complications: No anesthetic complications  PONV Status: none  Cardiovascular status: acceptable and hemodynamically stable  Respiratory status: acceptable  Hydration status: acceptable    Comments: Blood pressure 164/87, pulse 104, temperature 97.8 °F (36.6 °C), temperature source Oral, resp. rate 16, height 152.4 cm (60\"), weight 62.3 kg (137 lb 6.4 oz), SpO2 99 %.    Patient discharged from PACU based upon Carlos score. Please see RN notes for further details      "

## 2018-12-11 NOTE — OP NOTE
INSERTION VENOUS ACCESS DEVICE  Procedure Note    Kasey Rios  12/8/2018 - 12/11/2018    Pre-op Diagnosis:   * No pre-op diagnosis entered *    Post-op Diagnosis:     same    Procedure/CPT® Codes:      Procedure(s):  INSERTION VENOUS ACCESS DEVICE    Surgeon(s):  Ellie Griffith MD    Anesthesia: Monitor Anesthesia Care    Staff:   Circulator: Naheed Mahan RN  Scrub Person: Anaya Fairbanks Jessica    Estimated Blood Loss: minimal    Specimens:                None      Drains:   Closed/Suction Drain 1 Back Bulb (Active)   Site Description Unable to view 12/11/2018  7:37 AM   Dressing Status Clean;Intact;Dry 12/11/2018  7:37 AM   Drainage Appearance Serosanguineous 12/11/2018  7:37 AM   Status To bulb suction 12/11/2018  7:37 AM   Output (mL) 5 mL 12/11/2018  3:50 AM           Complications: none          Date: 12/11/2018  Time: 5:09 PM  The patient was brought to the operating room and placed in the supine position. After IV sedation and infusion of IV antibiotics, the patient was prepped and draped in the usual sterile fashion. Lidocaine 1% was used for local anesthesia. The vein was accessed, the wire passed. Fluoroscopy revealed it to be in good position. The pocket was incised, developed. The catheter was tunneled, cut to size, secured to the port, and the port sutured in place with 0 Prolene. Sheath passed, catheter was fed. Fluoroscopy revealed it to be in good position. Good flush and flow obtained. The wound was closed with 3-0 and 4-0 Vicryl sutures. Dressing was placed. The patient was awakened and transferred to the recovery room in stable condition, tolerated the procedure well. At the end of the procedure, all counts were correct.       Ellie Griffith MD

## 2018-12-11 NOTE — PROGRESS NOTES
Marylu Rios  : 1950  MRN: [id number]  [facility]: [id number]    Post-operative Day #1 from both labial biopsies by GYN and kyphoplasty with neurosurgery  Subjective   Medications being utilized for pain control: narcotic analgesics including oxycodone/acetaminophen (Percocet, Tylox).  Patient reports pain is not well-controlled, but is significantly less than yesterday.  She reports that there has been scant spotting from the biopsy sites on her R labia, and that there is burning when she voids.  Patient also c/o inability to clean herself because the area is so tender to touch.       Objective     Min/max vitals past 24 hours:   Temp  Min: 96.7 °F (35.9 °C)  Max: 97.8 °F (36.6 °C)  BP  Min: 116/88  Max: 177/98  Pulse  Min: 66  Max: 104  Resp  Min: 10  Max: 22        I/O last 3 completed shifts:  In: 2192.9 [I.V.:2192.9]  Out: 7145 [Urine:7100; Drains:45]    General: well developed; well nourished  no acute distress   Lungs:  Abdomen: breathing is unlabored  Not performed.   Pelvic: Biopsy sites identified by cautery marks.  All are dry, with previously-noted induration of surrounding tissue.   Ext: Calves NT     WBC   Date/Time Value Ref Range Status   2018 0549 13.81 (H) 4.80 - 10.80 10*3/mm3 Final     Hemoglobin   Date/Time Value Ref Range Status   2018 0549 11.7 (L) 12.0 - 16.0 g/dL Final     Hematocrit   Date/Time Value Ref Range Status   2018 0549 34.8 (L) 37.0 - 47.0 % Final     Platelets   Date/Time Value Ref Range Status   2018 0549 444 (H) 130 - 400 10*3/mm3 Final        Assessment   1. Post-op Day #1 S/P Vulvar biopsies and kyphoplasty  2. Patient with burning of R labia and difficulty getting clean after going to bathroom     Plan   1. Dermaplast and ilsa-care bottle to help with discomfort at vulva   2. Gyn will continue to follow and await pathology from biopsies.    Sara sEtrada MD  2018  7:33 AM

## 2018-12-11 NOTE — PLAN OF CARE
Problem: Skin Injury Risk (Adult)  Goal: Skin Health and Integrity  Outcome: Ongoing (interventions implemented as appropriate)      Problem: Patient Care Overview  Goal: Plan of Care Review  Outcome: Ongoing (interventions implemented as appropriate)   12/11/18 5424   Coping/Psychosocial   Plan of Care Reviewed With patient   Plan of Care Review   Progress improving   OTHER   Outcome Summary Currently in OR for port placement, VSS this shift, up with PT, safety maintained     Goal: Individualization and Mutuality  Outcome: Ongoing (interventions implemented as appropriate)    Goal: Interprofessional Rounds/Family Conf  Outcome: Ongoing (interventions implemented as appropriate)      Problem: Fall Risk (Adult)  Goal: Absence of Fall  Outcome: Ongoing (interventions implemented as appropriate)      Problem: Surgery Nonspecified (Adult)  Goal: Signs and Symptoms of Listed Potential Problems Will be Absent, Minimized or Managed (Surgery Nonspecified)  Outcome: Ongoing (interventions implemented as appropriate)    Goal: Anesthesia/Sedation Recovery  Outcome: Ongoing (interventions implemented as appropriate)

## 2018-12-11 NOTE — CONSULTS
"[facility] Progress Note    Progress Note    Patient:  Kasey Rios  YOB: 1950  Date of Service: 12/11/2018  MRN: [id number]   Acct: [id number]   Primary Care Physician: Provider, No Known  Advance Directive:   Code Status and Medical Interventions:   Ordered at: 12/08/18 0845     Code Status:    CPR     Medical Interventions (Level of Support Prior to Arrest):    Full     Admit Date: 12/8/2018       Hospital Day: 3      Subjective:     Chief Compliant:  \"A lot better.\"    Subjective:  Says lower back/paraspinal pains with distal radiculitis to the right leg \"100% better\" after kyphoplasty.    Interval history: LUMBAR LAMINECTOMY WITHOUT FUSION L3 REMOVAL SPINAL TUMOR.  KYPHOPLASTY WITH BIOPSY RADIOFREQUENCY TREATMENT TO TUMOR VULVA BIOPSY on 12/10/2018    Review of Systems:   Constitutional / general:  No fever /No chills /No sweats  HEENT: No sore throat /No hoarseness /No vision changes  CV:  No chest pain /No palpitations/No orthopnea   Respiratory:  She admits to baseline exertional dyspnea and shortness of breath with her routine activities though has been avoiding exertiin. She has had a chronic cough sometimes productive of discolored phlegm but denies overt hemoptysis or pleuritic chest pain.  GI: No nausea /No vomiting /No abdominal pain /(+) intermittent soft stools without diarrhea /(+) intermittent constipation/No melena/No hematochezia  :  Improved dysuria, frequency, and urgency for the past several weeks associated with a blood-tinged vaginal discharge. She denies difficulty controlling her bladder. She admits to nocturia.   Neuro: Much improved right paraspinal pain, improved right hip pain and improved distal radiculopathy to her posterior right thigh and knee since kyphoplasty/No dysphagia /No headache /No paresthesias  Musculoskeletal:  No edema /No cyanosis /(+) back pain    Vitals: /87 (BP Location: Left arm, Patient Position: Lying)   Pulse 99   Temp 98.2 " "°F (36.8 °C) (Oral)   Resp 18   Ht 152.4 cm (60\")   Wt 62.3 kg (137 lb 6.4 oz)   SpO2 96%   BMI 26.83 kg/m²     General appearance: alert, appears stated age and cooperative  Skin: Skin color, texture, turgor normal. No rashes or lesions  HEENT: Head: Normal, normocephalic, atraumatic.  Neck: no adenopathy, no carotid bruit, no JVD, supple, symmetrical, trachea midline and thyroid not enlarged, symmetric, no tenderness/mass/nodules  Lungs: diminished breath sounds bilaterally  Heart: regular rate and rhythm, S1, S2 normal, no murmur, click, rub or gallop and regular rate and rhythm  Abdomen: soft, non-tender; bowel sounds normal; no masses,  no organomegaly  Extremities: extremities normal, atraumatic, no cyanosis or edema  Neurologic: Mental status: Alert, oriented, thought content appropriate    24HR INTAKE/OUTPUT:      Intake/Output Summary (Last 24 hours) at 12/11/2018 0804  Last data filed at 12/11/2018 0633  Gross per 24 hour   Intake 2192.85 ml   Output 5145 ml   Net -2952.15 ml        [place] Catheter: None    Diet: Diet Regular; Thin      Medications:   Scheduled Meds:    ceFAZolin 2 g Intravenous Q8H   [MAR Hold] dexamethasone 4 mg Oral Q12H   [MAR Hold] lidocaine 1 patch Transdermal Q24H   sodium chloride 3 mL Intravenous Q12H       Continuous Infusions:    sodium chloride 75 mL/hr Last Rate: 75 mL/hr (12/11/18 0023)       Labs:     Results from last 7 days   Lab Units  12/11/18   0549  12/08/18   0454   WBC 10*3/mm3  13.81*  13.25*   HEMOGLOBIN g/dL  11.7*  12.1   HEMATOCRIT %  34.8*  35.7*   PLATELETS 10*3/mm3  444*  444*        Results from last 7 days   Lab Units  12/11/18   0549  12/08/18   0454   SODIUM mmol/L  133*  135   POTASSIUM mmol/L  4.0  4.1   CHLORIDE mmol/L  96*  99   CO2 mmol/L  29.0  28.0   BUN mg/dL  11  18   CREATININE mg/dL  0.59  0.63   CALCIUM mg/dL  8.6  8.5   GLUCOSE mg/dL  87  101*     12/10/2018 0906  12/10/2018 1043  CEA [[id number]]   (Abnormal)   Blood     Final result  CEA " 11.60 Abnormally high  ng/mL                     ABG:  No results found for: PHART, PO2ART, DSA5JUF    Culture Data:   Urine Culture   Date Value Ref Range Status   12/08/2018 >100,000 CFU/mL Mixed Gram Positive Sandra (A)  Final       Radiology Data:   Bone scan, 12/10/2018.    HISTORY: Large adrenal masses. Left lower lobe mass lesion with  intrathoracic lymphadenopathy.     Nuclear medicine bone scan: Following IV injection of 20.8 mCi of  technetium 99m HDP, delayed anterior and posterior whole body images are  obtained.     No intense abnormal tracer uptake is identified within the L3 vertebra  which suggests no osteoblastic activity at this site. There is arthritic  uptake noted within the shoulders and knees ankles and feet.  Asymmetrical increased tracer uptake is noted within the left glenoid  which could be degenerative as the arthritic changes are more prominent  on the left compared to the right with the CT chest exam of 12/8/2018.     Physiologic activity seen within the kidneys and the bladder.       Impression:     .   1. Only minimal tracer activity identified within the L3 vertebra on the  anterior images. The lack of intense uptake at the site of the L3  vertebral fracture indicates no significant osteoblastic activity at  this level.  2. Asymmetrical increased tracer activity of the left shoulder/glenoid  may be degenerative. Further assessment with MRI could be performed if  the presence of metastasis at this site would alter management.  This report was finalized on 12/10/2018 14:36 by Dr. Michelle Naranjo MD.             Imaging Results (last 72 hours)     Procedure Component Value Units Date/Time    CT Abdomen Pelvis With Contrast [[id number]] Collected:  12/08/18 1643     Updated:  12/08/18 1657    Narrative:       CT ABDOMEN PELVIS W CONTRAST- 12/8/2018 3:58 PM CST     HISTORY: Neoplasm: abdomen, other primary, suspected;  M84.58XA-Pathological fracture in neoplastic disease, other  specified  site, initial encounter for fracture; C80.1-Malignant (primary)  neoplasm, unspecified       COMPARISON: None.      DLP: 530 mGy cm. Automated exposure control was utilized to diminish  patient radiation dose.     TECHNIQUE: Following the oral ingestion and intravenous administration  of contrast, helical CT tomographic images of the abdomen and pelvis  were acquired. Coronal reformatted images were also provided for review.        FINDINGS:   There is a large lobular neoplastic mass within the left lower lobe.  There is associated studding of the pleura and major fissure on the  left. A paraspinal mass is also present at the level of the lesion and  the lesion is contiguous with the left infrahilar structures with left  hilar and infrahilar lymphadenopathy. There is also a middle mediastinal  cristi mass inseparable from the distal esophagus and I'm concerned that  there is most likely involvement with the esophagus from this component  of the neoplasm. The right lung bases clear. There is no pleural  effusion..      LIVER: No focal liver lesion. The hepatic vasculature is patent.      BILIARY SYSTEM: The gallbladder is unremarkable. No intrahepatic or  extrahepatic ductal dilatation.      PANCREAS: No focal pancreatic lesion.      SPLEEN: Unremarkable.      KIDNEYS AND ADRENALS: Bilateral necrotic adrenal metastases are present.  These were further discussed in the CT of the chest report. Both of  these measure more than 6 cm in size set. There is an ill-defined  hypodensity associated with the posterior interpolar aspect of the left  kidney measuring approximately 1.6 cm in size. I suspect that this may  also represent a metastasis to the left kidney. The kidneys otherwise  demonstrate normal enhancement.. The ureters are decompressed and normal  in appearance.     RETROPERITONEUM: There is ectasia of the infrarenal abdominal aorta. An  enlarged necrotic interaortocaval node is present measuring 2.2 x  2.1 cm  in size. A 10 mm short axis left paraortic node is also present..      GI TRACT: Diverticular disease is noted of the descending and sigmoid  colon without evidence of acute diverticulitis. There is no evidence of  mechanical bowel obstruction.. The appendix is surgically absent..     OTHER: No evidence of a mesenteric mass. Some enlarged periportal nodes  are present including a 12 mm short axis and 13 mm short axis node just  above the main portal vein in the landon hepatis. Some smaller nodes are  noted within the gastrohepatic ligament as well.. The abdominopelvic  vasculature is patent. There is a moderate compression fracture which is  a 2 column fracture involving the L3 vertebral body. Along the posterior  margin of this fracture is a epidural soft tissue mass. The bone is  rather permeative in appearance and I suspect this represents a  pathologic fracture related to bony metastases. A rind of soft tissue  density is also noted adjacent to the vertebral body. No other discrete  lesions are demonstrated.. No free fluid or localized inflammation is  present.      PELVIS: No mass lesion, fluid collection or significant lymphadenopathy  is seen in the pelvis. The patient's undergone prior hysterectomy. The  urinary bladder is unremarkable..       Impression:       1. Dominant mass within the left lower lobe of the lung. There is  associated hilar and middle mediastinal adenopathy. A left paraspinal  cristi mass is also present as well as a satellite lesion and pleural  studding.. Also noted is an enlarged interaortocaval node and some  enlarged periportal nodes. There is a ill-defined hypodense lesion  involving the posterior interpolar aspect of the left kidney suspicious  for metastatic disease. No evidence of hepatic lesions. Marked  enlargement and heterogeneity is noted of both adrenal glands consistent  with large adrenal metastases. Given the appearance of the lesions and  distribution I suspect  this may represent a small cell lung carcinoma. A  borderline enlarged left para-aortic node is also present.  2. Diverticulosis of the descending and sigmoid colon without evidence  of acute diverticulitis. No evidence of mechanical bowel obstruction..   3. Moderate two column fracture at the L3 level with a epidural mass  along the posterior margin of the vertebral body. This vertebral body is  rather permeative in appearance and I suspect this represents a  pathologic fracture related to metastatic disease with epidural  extension.        This report was finalized on 12/08/2018 16:54 by Dr. Tj Paredes MD.    CT Chest With Contrast [[id number]] Collected:  12/08/18 1630     Updated:  12/08/18 1644    Narrative:       CT CHEST W CONTRAST- 12/8/2018 3:58 PM CST     HISTORY: Neoplasm: chest, heme/lymphatic, recurrence, suspected/known;  M84.58XA-Pathological fracture in neoplastic disease, other specified  site, initial encounter for fracture; C80.1-Malignant (primary)  neoplasm, unspecified      COMPARISON: None      DLP: 340 mGy cm. Automated exposure control was utilized to diminish  patient radiation dose.     TECHNIQUE: Serial helical tomographic images of the chest were acquired  following the infusion of Isovue contrast intravenously. Bone and soft  tissue algorithms were provided.       FINDINGS:   Neck base: The imaged portion of the neck and thyroid gland is  unremarkable.      Lungs: There are moderate changes of centrilobular emphysema. There is a  heterogeneously enhancing 5.6 x 5.7 cm lobular mass within the left  lower lobe. A peripheral satellite lesion also noted within left lower  lobe measures 1.3 cm in size. There is irregular nodular thickening of  the major fissure on the left. There is some postobstructive atelectasis  within the left lower lobe. A pleural-based paraspinal mass is noted  within the medial left lung base measuring 2.2 x 1.5 cm in size. This  lower lobe mass is contiguous  along its upper aspect with left hilar and  infrahilar lymphadenopathy. It is also contiguous with a mass posterior  to the left atrium which is inseparable from the esophagus. I suspect  there is most likely some involvement of the distal esophagus with the  neoplasm. The mass at this level measures approximately 3.9 x 2.5 cm in  size. There is a granuloma within the right upper lobe. No discrete  right-sided lung lesion is present.. No pleural effusion is present. The  left lower lobe is truncated and extends directly into the left lower  lobe mass lesion. The tracheobronchial tree are otherwise patent..      Heart: There is mild cardiomegaly. Coronary artery calcifications noted  in the right coronary, LAD and circumflex distributions. No evidence of  pericardial effusion..    .      Vasculature: Aorta is normal in caliber and appearance without  significant atherosclerosis, stenosis, or aneurysm. A prevascular node  is present adjacent to the proximal aortic arch. This measures  approximately 4.9 x 2.9 cm in size. There is atheromatous calcification  with mild stenosis at the origin left common carotid artery left  subclavian artery. The proximal great vessels are otherwise  unremarkable. There is effacement of the distal left main pulmonary  artery along its posterior margin related to the hilar component of the  patient's neoplasm.     Lymph nodes: No additional enlarged axillary or mediastinal nodes are  present..      Bones and soft tissues: No acute osseous or soft tissue abnormality is  seen. There are no worrisome osseous lesions.      Upper abdomen: Bilateral adrenal metastases are present. The left  measures 5.5 x 6.1 and the right measures 6.7 x 5.4 cm in size. A small  hiatal hernia is present. An enlarged interaortocaval node is present  within the upper retroperitoneum measuring 1.9 x 2.3 cm in size..        Impression:       1. Large mass lesion within the left lower lobe. This is contiguous  with  the left inferior hilar structures with associated left perihilar  lymphadenopathy. There is also evidence of metastatic disease to the  middle mediastinum at the level of the more distal esophagus. The  margins of the esophagus are difficult to delineate at this level and I  suspect there is secondary involvement of the esophagus. There is also  an enlarged prevascular node as well as a paraspinal cristi mass at the  level of the left lung base. Bilateral adrenal metastases are present.  There is periportal and interaortocaval adenopathy within the upper  abdomen. Satellite lesions are noted within the left lower lobe. There  is nodular thickening of the major fissure on the left as well  consistent with pleural studding..  2. Coronary calcifications as described above.  3. There is some effacement of the left main pulmonary artery along its  posterior aspect related to the hilar cristi component of the mass..        This report was finalized on 12/08/2018 16:41 by Dr. Tj Paredes MD.    CT Lumbar Spine Without Contrast [[id number]] Collected:  12/08/18 0658     Updated:  12/08/18 0735    Addenda:        Addendum: With additional imaging of the spine with MR imaging I am  concerned that the adrenal lesions and L3 fracture most likely represent  sequela of metastatic disease. The L3 vertebral body is somewhat  permeative in appearance which would also suggest the possibility of a  pathologic fracture related to metastatic disease. I suspect the ventral  mass within the spinal canal is related to neoplastic extension into the  spinal canal. There is a suspected lesion within the left lower lobe on  the upper most cut of today's exam and I suspect the constellation of  findings is related to a primary lung carcinoma with metastatic disease  to the adrenal glands and the L3 vertebral body. I spoke with Rin Ryan in the emergency room at 7:30 AM concerning the findings and  concern for metastatic disease  resulting in pathologic fracture at L3.  This report was finalized on 12/08/2018 07:32 by Dr. Tj Paredes MD.  Signed:  12/08/18 0732 by Tj Paredes MD    Narrative:       CT LUMBAR SPINE WO CONTRAST- 12/8/2018 3:54 AM CST     History: low back pain     Comparison: None      DLP: 675 mGy cm. Automated exposure control was utilized to diminish  patient radiation dose.     Technique: Serial helical tomographic images of the lumbar spine were  obtained without the use of intravenous contrast. Additionally, sagittal  and coronal reformatted images were provided for review.      Findings:   Alignment: Normal.     Bones: There is an acute 2 column fracture involving the anterior and  middle column of the L3 vertebral body. There is involvement of the  right L3 pedicle. There is no evidence of retropulsion. Centrally there  is an 80% loss of height. Vertebral body heights are otherwise  maintained.     Disc spaces: Maintained.     Canal and neuroforamina: There is no retropulsion at the L3 fracture  there is a 1.4 x 1.0 cm slightly hyperdense extradural mass with  associated effacement of the thecal sac. I feel this likely represents  an epidural hematoma follow-up with MRI would be recommended if not  contraindicated in this patient. Broad-based bulging of disc as well as  facet and ligamentous hypertrophy at the L3-L4, L4-L5 and L5-S1 level  contributes to central and foraminal stenosis.     Soft tissues: A 6.4 cm right super renal mass is demonstrated measuring  Hounsfield units of 34 suggesting it to be solid in nature. This would  also warrant follow-up. Also suspect a left adrenal lesion although not  as well-visualized. Metastatic disease is not entirely excluded.       Impression:       Impression:   1. Acute 2 column fracture involving anterior middle columns of the L3  vertebral body with involvement of the right pedicle. There is no  evidence of retropulsion of the posterior aspect of the  vertebral body  but there is evidence of thecal sac compression secondary to what I  suspect is an epidural hematoma along the ventral aspect of the thecal  sac. This measures approximately 1.4 x 1.0 cm in size. Follow-up imaging  with MRI is recommended if not contraindicated in this patient.  2. Large right suprarenal mass most likely related to the adrenal gland  but incompletely imaged. Adrenal cortical carcinoma is not excluded  given the size of this lesion. Given the history of trauma and adrenal  hematoma would also be in the differential but considered less likely.  Metastatic disease should also be considered. Some fullness within the  left suprarenal space is also suspicious for a left adrenal lesion  although incompletely imaged..        This report was finalized on 12/08/2018 07:07 by Dr. Tj Paredes MD.    MRI Lumbar Spine With & Without Contrast [[id number]] Collected:  12/08/18 0707     Updated:  12/08/18 0732    Narrative:       MRI LUMBAR SPINE W WO CONTRAST- 12/8/2018 6:05 AM CST     HISTORY: rule out epidural hematoma     COMPARISON: CT exam of earlier the same day.      TECHNIQUE: Multiplanar, multisequence MRI of the lumbar spine was  performed with and without the use of contrast.     FINDINGS:      Alignment: There are presumed to be 5 lumbar-type vertebrae with the  most inferior being labeled L5. Normal lumbar lordosis is present. There  is a 2 column fracture involving the anterior and middle columns of the  L3 vertebral body with an approximate 80% loss of height centrally.  There is no evidence of retropulsion of the vertebral body. There is  diffuse edema within the vertebral body as would be anticipated.. As  suspected by CT examination there is a ventral mass along the posterior  margin of the L3 vertebral body measuring approximately 2.2 cm in  transverse dimension by 1.1 cm in anterior to posterior dimension. This  is resulting in effacement of the thecal sac with moderate  central  spinal stenosis. The anterior posterior dimension of the canal is  narrowed to 6 mm at this level.     Marrow signal: There is diffuse abnormal signal within the L3 vertebral  body. There is otherwise normal marrow signal throughout the lumbar  spine. Degenerative disc disease is noted lower 2 lumbar disc levels  with mild disc desiccation..      Cord/Canal: The conus medullaris terminates at the level of L1. The  visualized spinal cord is normal in signal and morphology.      Soft tissues: Paraspinal soft tissue is noted related at the level of  the fractured L3 representing mild paraspinal hematoma versus tumor..  Bilateral adrenal masses are present each of which measure over 6 cm in  size. Given the size of the lesions adenomas are considered unlikely.  Follow-up with CT imaging of the chest, abdomen and pelvis is  recommended. Metastatic disease is not excluded.     Levels:        L1-L2: No disc bulge is present. There is moderate facet and mild  ligamentous hypertrophy. No evidence of central or foraminal stenosis..      L2-L3: There is mild bulging of disc as well as moderate facet and  ligamentous hypertrophy with mild ventral and posterolateral effacement  of the thecal sac. There is no central spinal stenosis. Bulging of the  disc and facet overgrowth does contribute to mild bilateral neural  foraminal narrowing..      L3-L4: Above the L3-L4 disc level there is the previously discussed  epidural mass which is resulting in effacement of the thecal sac with  moderate to severe central spinal stenosis. At the L3-L4 disc level  there is broad-based bulging disc as well as moderate facet and  ligamentous hypertrophy also contributing to mild central spinal  stenosis. There is severe bilateral neural foraminal narrowing related  to bulging disc and facet overgrowth along the under margin of the  foramen and the epidural hematoma along the upper margin of both  foramen..      L4-L5: There is broad-based  bulging of the disc with an annular fissure  along the posterior central annulus. There is moderate facet and  ligamentous hypertrophy with moderate central spinal stenosis. Facet and  ligamentous hypertrophy and bulging of the disc results in severe  bilateral neural foraminal narrowing..      L5-S1: There is bulging of disc with a small central disc protrusion.  There is moderate facet and ligamentous hypertrophy. No evidence of  central spinal stenosis. Severe right-sided and moderate left-sided  foraminal narrowing is present..      Postcontrast: There is diffuse enhancement associated with the L3  vertebral body. There is also enhancement associated with the epidural  mass. Although some enhancement could be anticipated associated with a  fracture the enhancement of the epidural mass I feel is concerning for a  pathologic fracture with epidural extension of neoplasia. Bilateral  bilateral adrenal mass lesions demonstrating peripheral enhancement with  a question with some central necrosis is also suggestive of metastatic  disease.. The right lesion measures 6.1 cm and the left adrenal lesion  6.2 cm in size. Adrenal metastasis should be considered in the  differential. Follow-up imaging of the chest, abdomen and pelvis would  be suggested, particularly if the patient is a smoker.       Impression:       1. Acute 2 column fracture involving the L3 vertebral body involving the  anterior and middle column and right pedicle with associated edema and  paraspinal soft tissue consistent with either hematoma or neoplasia.  There is no retropulsion of the posterior aspect of the vertebral body  but there is a moderate size epidural mass within the ventral spinal  canal resulting in effacement of the thecal sac and moderate to severe  central spinal stenosis. This is also contributing to neural foraminal  narrowing bilaterally at the L3-L4 disc level related to a combination  of bulging disc and facet overgrowth and the  epidural mass. Given the  findings within the adrenals and the enhancement of the epidural mass  I'm concerned this represents a pathologic fracture related to  metastatic disease with associated extension into the ventral epidural  space. The patient does not give a history of any recent trauma making a  posttraumatic fracture at L3 less likely. No other discrete spinal  lesions are demonstrated. I would suggest follow-up with a CT of the  chest, abdomen and pelvis to further evaluate for potential metastatic  disease, particularly if the patient is a smoker. The adrenal lesions  are worrisome for metastatic disease.  2. Central and foraminal stenosis at other levels related to bulging the  disc as well as facet and ligamentous hypertrophy..  3. Bilateral adrenal masses. Follow-up CT imaging is recommended.  Metastatic disease is not excluded.        This report was finalized on 12/08/2018 07:28 by Dr. Tj Paredes MD.    XR Hip With or Without Pelvis 2 - 3 View Right [[id number]] Collected:  12/08/18 0656     Updated:  12/08/18 0700    Narrative:       EXAMINATION: AP pelvis and two-view right hip 12/8/2018     HISTORY: Right hip pain     FINDINGS: AP radiograph of the pelvis as well as two-view exam of the  right hip demonstrates no fracture or dislocation. The joint space is  well-preserved. The femoral head is concentric and well located within  the acetabulum. No evidence of AVN.       Impression:       . Normal exam of the pelvis and right hip.  This report was finalized on 12/08/2018 06:57 by Dr. Tj Paredes MD.              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    POD # 1, LUMBAR LAMINECTOMY WITHOUT FUSION L3 REMOVAL SPINAL TUMOR KYPHOPLASTY WITH BIOPSY RADIOFREQUENCY TREATMENT TO TUMOR. VULVA  BIOPSY  2.    Widely metastatic malignancy, agree likely lung primary with small cell carcinoma at the forefront of all the differential possibilities.   · Tumor stage: IV or extensive stage (cT4 cN2 M1).  · Tumor burden: 5.6 x 5.7 cm mass within the left lower lobe with 1.3 cm peripheral satellite lesion in the left lower lobe, pleural based paraspinal mass in the medial left lung, 2.2 x 1.5 cm contiguous with infrahilar lymphadenopathy, and 2.5 x 3.9 cm contiguous mass posterior to the left atrium inseparable from the esophagus. 4.9 x 2.9 cm prevascular node. Effacement of the distal left main pulmonary artery along its posterior margin related to the hilar component of the neoplasm. Bilateral adrenal metastases (5.5 x 6.1 on the left and 6.7 x 5.4 cm on the right). Interaortocaval node 1.9 x 2.3 cm. Pleural studding. Ill-defined hypodense lesion involving the posterior interpolar aspect of the left kidney suspicious for metastatic disease. L3 vertebral body epidural mass associated with extension into the ventral epidural space (1.4 x 1.0 cm extradural mass associated with effacement of the thecal sac).   · Complications of tumor: Acute 2 column fracture involving L3 vertebral body involving the anterior and middle column and right pedicle with associated edema. Resultant myelopathic symptoms with radicular pain to the right leg and inability to walk.  · Tumor status: Untreated. Tissue diagnosis pending.  · Prognosis: Poor.  3.    Vulvar/right labial mass/lesion. 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  4.    Anemia, likely of chronic disease/malignancy  5.    Thrombocytosis, likely reactive  6.    Long-standing tobacco smoker (1/2 pack per day since age 18).  7.    Coronary artery calcifications (right coronary left anterior descending (LAD) and circumflex distributions).  8.    Poor performance status (ECOG 3).  9.    Poor overall prognosis.     RECOMMENDATIONS:  1.     Re: The patient was  previously apprised of the available diagnostic information. I explained that she likely has extensive stage lung malignancy for which cure is not possible and for which the intent of therapy is palliative.  2.    Op notes from L3 laminectomy, tumor debulking with biopsy, kyphoplasty, radiofrequency treatment and vulvar biopsy on 12/10 also noted.   3.    Brain MRI to complete staging.  4.    Check anemia substrates  5.    Surgical consult for Mediport placement  6.    Continue general medical support. Will need PT prior to disposition.  7.    Radiation oncology consult pending.  8.    Home with outpatient follow-up when ok with others

## 2018-12-11 NOTE — THERAPY EVALUATION
Acute Care - Physical Therapy Initial Evaluation  [facility]     Patient Name: Kasey Rios  : 1950  MRN: [id number]  Today's Date: 2018   Onset of Illness/Injury or Date of Surgery: 18  Date of Referral to PT: 12/10/18  Referring Physician: Dr. Benitez      Admit Date: 2018    Visit Dx:     ICD-10-CM ICD-9-CM   1. 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 Malignancy (CMS/HCC) C80.1 199.1   3. 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 Vulvar lesion N90.89 624.8   5. 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)     Past Medical History:   Diagnosis Date   • Hypertension      Past Surgical History:   Procedure Laterality Date   • APPENDECTOMY     • ECTOPIC PREGNANCY      Ruptured ectopic   • TOTAL ABDOMINAL HYSTERECTOMY WITH SALPINGO OOPHORECTOMY      DUB, Fibroids, benign        PT ASSESSMENT (last 12 hours)      Physical Therapy Evaluation     Row Name 18 1131 18 1059       PT Evaluation Time/Intention    Subjective Information  complains of;pain  -MS  --    Document Type  evaluation  -MS  --  -MS    Mode of Treatment  physical therapy;concurrent therapy  -MS  --  -MS    Row Name 18 1131 18 1059       General Information    Patient Profile Reviewed?  yes  -MS  --  -MS    Onset of Illness/Injury or Date of Surgery  18  -MS  --  -MS    Referring Physician  Dr. Benitez  -MS  --  -MS    Patient Observations  alert;cooperative;agree to therapy  -MS  --  -MS    Patient/Family Observations  multiple family members present in the rroom  -MS  --    General Observations of Patient  pt fowlers in bed, awake and alert  in no apparent distress  -MS  --    Pertinent History of Current Functional Problem  L3 bilateral laminectomy resec L3 bilateral laminectomy resection of epidural tumortion of epidural tumor, biopsy of labia   -MS  --  -MS    Existing Precautions/Restrictions  fall;spinal  -MS  --    Risks Reviewed  patient:;LOB;nausea/vomiting;dizziness;increased discomfort;change in vital signs  -MS  --    Benefits Reviewed  patient:;improve function;increase independence;increase strength;increase balance;decrease pain;increase knowledge  -MS  --    Barriers to Rehab  physical barrier  -MS  --    Row Name 12/11/18 1131          Cognitive Assessment/Interventions    Additional Documentation  Cognitive Assessment/Intervention (Group)  -MS     Row Name 12/11/18 1131          Cognitive Assessment/Intervention- PT/OT    Affect/Mental Status (Cognitive)  WNL  -MS     Orientation Status (Cognition)  oriented x 4  -MS     Follows Commands (Cognition)  WNL  -MS     Personal Safety Interventions  fall prevention program maintained;gait belt;nonskid shoes/slippers when out of bed;supervised activity  -MS     Row Name 12/11/18 1131          Safety Issues, Functional Mobility    Impairments Affecting Function (Mobility)  endurance/activity tolerance;pain;shortness of breath  -MS     Row Name 12/11/18 1131          Bed Mobility Assessment/Treatment    Bed Mobility Assessment/Treatment  supine-sit;sit-supine;scooting/bridging  -MS     Scooting/Bridging [place] (Bed Mobility)  independent  -MS     Supine-Sit [place] (Bed Mobility)  conditional independence  -MS     Sit-Supine [place] (Bed Mobility)  conditional independence  -MS     Assistive Device (Bed Mobility)  bed rails  -MS     Comment (Bed Mobility)  pt rolled too far and almost rolled off the bed. Cues to not roll so far  -MS     Row Name 12/11/18 1131          Transfer Assessment/Treatment    Transfer Assessment/Treatment  sit-stand transfer;stand-sit transfer  -MS      Sit-Stand [place] (Transfers)  contact guard  -MS     Stand-Sit [place] (Transfers)  supervision  -MS     Row Name 12/11/18 1131          Sit-Stand Transfer    Assistive Device (Sit-Stand Transfers)  -- HHA  -MS     Row Name 12/11/18 1131          Gait/Stairs Assessment/Training    [place] Level (Gait)  contact guard  -MS     Assistive Device (Gait)  -- HHA  -MS     Distance in Feet (Gait)  75ft, limited by R leg feeling weak the longer she walked  -MS     Row Name 12/11/18 1131          General ROM    GENERAL ROM COMMENTS  all extremities WFL  -MS     Row Name 12/11/18 1131          MMT (Manual Muscle Testing)    General MMT Comments  all extremities 5/5, however the R LE fatigues with walking  -MS     Row Name 12/11/18 1131          Sensory Assessment/Intervention    Sensory General Assessment  no sensation deficits identified  -MS     Row Name 12/11/18 1131          Pain Assessment    Additional Documentation  Pain Scale: Numbers Pre/Post-Treatment (Group);Pain Scale 2: Numbers Pre/Post-Treatment (Group)  -MS     Row Name 12/11/18 1131          Pain Scale: Numbers Pre/Post-Treatment    Pain Scale: Numbers, Pretreatment  5/10  -MS     Pain Scale: Numbers, Post-Treatment  5/10  -MS     Pain Location  back  -MS     Pre/Post Treatment Pain Comment  R LE fatigues with walking  -MS     Pain Intervention(s)  Repositioned;Ambulation/increased activity  -MS     Row Name 12/11/18 1131          Pain Scale 2: Numbers Pre/Post-Treatment    Pain Scale 2: Numbers, Pretreatment  8/10  -MS     Pain Scale 2: Numbers, Post-Treatment  8/10  -MS     Pain Location 2  -- labia-surgery site  -MS     Pre/Post Treatment Pain 2 Comment  increases with sitting up due to pressure  -MS     Pain Intervention(s) 2  Medication (See MAR);Repositioned;Ambulation/increased activity  -MS     Row Name             Wound 12/08/18 1600 Right labia other (see comments)    Wound - Properties Group Date first assessed: 12/08/18  -SK Time  first assessed: 1600  -SK Present On Admission : yes  -SK Side: Right  -SK Location: labia  -SK Type: other (see comments)  -SK    Row Name             Wound 12/10/18 1739 Other (See comments) other (see notes) incision    Wound - Properties Group Date first assessed: 12/10/18  -HUGO Time first assessed: 1739  -HUGO Side: Other (See comments)  -HUGO Location: other (see notes)  -HUGO Type: incision  -HUGO    Row Name             Wound 12/10/18 1739 Other (See comments) back incision    Wound - Properties Group Date first assessed: 12/10/18  -HUGO Time first assessed: 1739  -HUGO Side: Other (See comments)  -HUGO Location: back  -HUGO Type: incision  -HUGO    Row Name 12/11/18 1131          Plan of Care Review    Plan of Care Reviewed With  patient  -MS     Row Name 12/11/18 1131          Physical Therapy Clinical Impression    Date of Referral to PT  12/10/18  -MS     Patient/Family Goals Statement (PT Clinical Impression)  decrease pain with mobility  -MS     Criteria for Skilled Interventions Met (PT Clinical Impression)  yes;treatment indicated  -MS     Pathology/Pathophysiology Noted (Describe Specifically for Each System)  neuromuscular  -MS     Impairments Found (describe specific impairments)  aerobic capacity/endurance;gait, locomotion, and balance;motor function  -MS     Rehab Potential (PT Clinical Summary)  good, to achieve stated therapy goals  -MS     Predicted Duration of Therapy (PT)  until discharge  -MS     Care Plan Review (PT)  evaluation/treatment results reviewed;care plan/treatment goals reviewed;risks/benefits reviewed;current/potential barriers reviewed;patient/other agree to care plan  -MS     Row Name 12/11/18 1131          Vital Signs    Pre SpO2 (%)  94  -MS     O2 Delivery Pre Treatment  supplemental O2 2.5 L  -MS     Intra SpO2 (%)  92  -MS     O2 Delivery Intra Treatment  room air  -MS     Post SpO2 (%)  95  -MS     O2 Delivery Post Treatment  -- 2.5L  -MS     Pre Patient Position  Sitting  -MS     Intra  Patient Position  Standing  -MS     Post Patient Position  Sitting  -MS     Row Name 12/11/18 1131          Physical Therapy Goals    Transfer Goal Selection (PT)  transfer, PT goal 1  -MS     Gait Training Goal Selection (PT)  gait training, PT goal 1  -MS     Stairs Goal Selection (PT)  stairs, PT goal 1  -MS     Additional Documentation  Stairs Goal Selection (PT) (Row)  -MS     Row Name 12/11/18 1131          Transfer Goal 1 (PT)    Activity/Assistive Device (Transfer Goal 1, PT)  sit-to-stand/stand-to-sit;bed-to-chair/chair-to-bed  -MS     [place] Level/Cues Needed (Transfer Goal 1, PT)  independent  -MS     Time Frame (Transfer Goal 1, PT)  long term goal (LTG);by discharge  -MS     Progress/Outcome (Transfer Goal 1, PT)  goal ongoing  -MS     Row Name 12/11/18 1131          Gait Training Goal 1 (PT)    Activity/Assistive Device (Gait Training Goal 1, PT)  gait (walking locomotion);decrease fall risk  -MS     [place] Level (Gait Training Goal 1, PT)  independent  -MS     Distance (Gait Goal 1, PT)  200ft maintaining O2 sat at or above 95% and no complaint of R LE weakness  -MS     Time Frame (Gait Training Goal 1, PT)  long term goal (LTG);by discharge  -MS     Progress/Outcome (Gait Training Goal 1, PT)  goal ongoing  -MS     Row Name 12/11/18 1131          Stairs Goal 1 (PT)    Activity/Assistive Device (Stairs Goal 1, PT)  ascending stairs;descending stairs;using handrail, right  -MS     [place] Level/Cues Needed (Stairs Goal 1, PT)  supervision required  -MS     Number of Stairs (Stairs Goal 1, PT)  3  -MS     Time Frame (Stairs Goal 1, PT)  long term goal (LTG);by discharge  -MS     Progress/Outcome (Stairs Goal 1, PT)  goal ongoing  -MS     Row Name 12/11/18 1131          Positioning and Restraints    Post Treatment Position  bed  -MS     In Bed  fowlers;call light within reach;encouraged to call for assist;with family/caregiver;side rails up x2  -MS       User Key  (r) = Recorded By, (t)  = Taken By, (c) = Cosigned By    Initials Name Provider Type    MS Nicole Camargo JAYDE, PT, DPT, NCS Physical Therapist    Hodan Acuna RN Registered Nurse    Henrietta Falcon RN Registered Nurse        Physical Therapy Education     Title: PT OT SLP Therapies (In Progress)     Topic: Physical Therapy (In Progress)     Point: Mobility training (Done)     Learning Progress Summary           Patient Acceptance, E, VU by MS at 12/11/2018 12:23 PM    Comment:  role of PT in her care, importance of mobility after surgery to help decrease pain and help increase O2                               User Key     Initials Effective Dates Name Provider Type Discipline    MS 06/19/18 -  Raman Nicole JAYDE, PT, DPT, NCS Physical Therapist PT              PT Recommendation and Plan  Anticipated Discharge Disposition (PT): home with assist  Planned Therapy Interventions (PT Eval): balance training, gait training, patient/family education, stair training, strengthening, transfer training  Therapy Frequency (PT Clinical Impression): 2 times/day  Outcome Summary/Treatment Plan (PT)  Anticipated Discharge Disposition (PT): home with assist  Plan of Care Reviewed With: patient  Progress: improving  Outcome Summary: PT evaluation completed. The patient has full strength in her legs now and the pain in her right leg is gone. .However when walking, her right leg became fatigued. The patient is also on supplemental O2 which is new for her since admission. PT will continue to work with the patient to decrease her back pain, increase her endurance, and improve her O2 sat with activity.Home with assist is recommended upon discharge. .  Outcome Measures     Row Name 12/11/18 1131             How much help from another person do you currently need...    Turning from your back to your side while in flat bed without using bedrails?  4  -MS      Moving from lying on back to sitting on the side of a flat bed without bedrails?  3  -MS      Moving  to and from a bed to a chair (including a wheelchair)?  3  -MS      Standing up from a chair using your arms (e.g., wheelchair, bedside chair)?  3  -MS      Climbing 3-5 steps with a railing?  2  -MS      To walk in hospital room?  3  -MS      AM-PAC 6 Clicks Score  18  -MS         Functional Assessment    Outcome Measure Options  AM-PAC 6 Clicks Basic Mobility (PT)  -MS        User Key  (r) = Recorded By, (t) = Taken By, (c) = Cosigned By    Initials Name Provider Type    MS Nicole Camargo, PT, DPT, NCS Physical Therapist         Time Calculation:   PT Charges     Row Name 12/11/18 1227             Time Calculation    Start Time  1131  -MS      Stop Time  1200  -MS      Time Calculation (min)  29 min  -MS      PT Received On  12/11/18  -MS      PT Goal Re-Cert Due Date  12/21/18  -MS        User Key  (r) = Recorded By, (t) = Taken By, (c) = Cosigned By    Initials Name Provider Type    MS Nicole Camargo PT, DPT, NCS Physical Therapist        Therapy Suggested Charges     Code   Minutes Charges    None           Therapy Charges for Today     Code Description Service Date Service Provider Modifiers Qty    48595840354 HC PT MOBILITY CURRENT 12/11/2018 Nicole Camargo PT, DPT, NCS GP, CK 1    88594594203 HC PT MOBILITY PROJECTED 12/11/2018 Nicole Camargo PT, DPT, NCS GP, CI 1    [id number] HC PT EVAL LOW COMPLEXITY 2 12/11/2018 Nicole Camargo PT, DPT, NCS GP 1          PT G-Codes  Outcome Measure Options: AM-PAC 6 Clicks Basic Mobility (PT)  AM-PAC 6 Clicks Score: 18  Functional Limitation: Mobility: Walking and moving around  Mobility: Walking and Moving Around Current Status (): At least 40 percent but less than 60 percent impaired, limited or restricted  Mobility: Walking and Moving Around Goal Status (): At least 1 percent but less than 20 percent impaired, limited or restricted      Nicole Camargo, PT, DPT, NCS  12/11/2018

## 2018-12-12 LAB
IRON 24H UR-MRATE: 51 MCG/DL (ref 42–180)
IRON SATN MFR SERPL: 20 % (ref 20–45)
TIBC SERPL-MCNC: 256 MCG/DL (ref 225–420)

## 2018-12-12 PROCEDURE — 83550 IRON BINDING TEST: CPT | Performed by: INTERNAL MEDICINE

## 2018-12-12 PROCEDURE — 94760 N-INVAS EAR/PLS OXIMETRY 1: CPT

## 2018-12-12 PROCEDURE — 83540 ASSAY OF IRON: CPT | Performed by: INTERNAL MEDICINE

## 2018-12-12 PROCEDURE — 99024 POSTOP FOLLOW-UP VISIT: CPT | Performed by: NEUROLOGICAL SURGERY

## 2018-12-12 PROCEDURE — 99231 SBSQ HOSP IP/OBS SF/LOW 25: CPT | Performed by: OBSTETRICS & GYNECOLOGY

## 2018-12-12 PROCEDURE — 25010000002 CYANOCOBALAMIN PER 1000 MCG: Performed by: INTERNAL MEDICINE

## 2018-12-12 PROCEDURE — 94799 UNLISTED PULMONARY SVC/PX: CPT

## 2018-12-12 PROCEDURE — 63710000001 DEXAMETHASONE PER 0.25 MG: Performed by: NEUROLOGICAL SURGERY

## 2018-12-12 PROCEDURE — 25010000002 MORPHINE PER 10 MG: Performed by: NEUROLOGICAL SURGERY

## 2018-12-12 RX ORDER — PANTOPRAZOLE SODIUM 20 MG/1
20 TABLET, DELAYED RELEASE ORAL
Status: DISCONTINUED | OUTPATIENT
Start: 2018-12-13 | End: 2018-12-13 | Stop reason: HOSPADM

## 2018-12-12 RX ORDER — CYANOCOBALAMIN 1000 UG/ML
1000 INJECTION, SOLUTION INTRAMUSCULAR; SUBCUTANEOUS DAILY
Status: DISCONTINUED | OUTPATIENT
Start: 2018-12-12 | End: 2018-12-13 | Stop reason: HOSPADM

## 2018-12-12 RX ADMIN — OXYCODONE HYDROCHLORIDE AND ACETAMINOPHEN 1 TABLET: 7.5; 325 TABLET ORAL at 22:47

## 2018-12-12 RX ADMIN — CYANOCOBALAMIN 1000 MCG: 1000 INJECTION, SOLUTION INTRAMUSCULAR at 11:02

## 2018-12-12 RX ADMIN — MORPHINE SULFATE 2 MG: 2 INJECTION, SOLUTION INTRAMUSCULAR; INTRAVENOUS at 20:42

## 2018-12-12 RX ADMIN — DEXAMETHASONE 4 MG: 4 TABLET ORAL at 20:42

## 2018-12-12 RX ADMIN — OXYCODONE HYDROCHLORIDE AND ACETAMINOPHEN 1 TABLET: 7.5; 325 TABLET ORAL at 12:38

## 2018-12-12 RX ADMIN — LIDOCAINE 1 PATCH: 50 PATCH CUTANEOUS at 09:40

## 2018-12-12 RX ADMIN — DEXAMETHASONE 4 MG: 4 TABLET ORAL at 09:40

## 2018-12-12 RX ADMIN — MORPHINE SULFATE 2 MG: 2 INJECTION, SOLUTION INTRAMUSCULAR; INTRAVENOUS at 17:30

## 2018-12-12 RX ADMIN — SODIUM CHLORIDE 75 ML/HR: 9 INJECTION, SOLUTION INTRAVENOUS at 20:03

## 2018-12-12 RX ADMIN — OXYCODONE HYDROCHLORIDE AND ACETAMINOPHEN 1 TABLET: 7.5; 325 TABLET ORAL at 18:30

## 2018-12-12 RX ADMIN — SODIUM CHLORIDE, PRESERVATIVE FREE 3 ML: 5 INJECTION INTRAVENOUS at 09:41

## 2018-12-12 RX ADMIN — OXYCODONE HYDROCHLORIDE AND ACETAMINOPHEN 1 TABLET: 7.5; 325 TABLET ORAL at 01:32

## 2018-12-12 RX ADMIN — OXYCODONE HYDROCHLORIDE AND ACETAMINOPHEN 1 TABLET: 7.5; 325 TABLET ORAL at 08:10

## 2018-12-12 RX ADMIN — SODIUM CHLORIDE 75 ML/HR: 9 INJECTION, SOLUTION INTRAVENOUS at 07:15

## 2018-12-12 RX ADMIN — MORPHINE SULFATE 2 MG: 2 INJECTION, SOLUTION INTRAMUSCULAR; INTRAVENOUS at 13:34

## 2018-12-12 RX ADMIN — MORPHINE SULFATE 2 MG: 2 INJECTION, SOLUTION INTRAMUSCULAR; INTRAVENOUS at 11:00

## 2018-12-12 RX ADMIN — MORPHINE SULFATE 2 MG: 2 INJECTION, SOLUTION INTRAMUSCULAR; INTRAVENOUS at 05:06

## 2018-12-12 NOTE — PROGRESS NOTES
"[facility] Progress Note    Progress Note    Patient:  Kasey Rios  YOB: 1950  Date of Service: 12/12/2018  MRN: [id number]   Acct: [id number]   Primary Care Physician: Provider, No Known  Advance Directive:   Code Status and Medical Interventions:   Ordered at: 12/08/18 0845     Code Status:    CPR     Medical Interventions (Level of Support Prior to Arrest):    Full     Admit Date: 12/8/2018       Hospital Day: 4      Subjective:     Chief Compliant: \"Better.  I could move my right leg after surgery.\"  Seen with son, nurse, daughter in law at bedside. Port placed yesterday.       Review of Systems:   Review of Systems   Constitutional: Negative for chills, diaphoresis and fever.   HENT: Negative for facial swelling, sore throat and trouble swallowing.    Eyes: Negative for redness and visual disturbance.   Respiratory:        Less shortness of air.    Cardiovascular: Negative for chest pain and leg swelling.   Gastrointestinal: Negative for abdominal pain, nausea and vomiting.   Genitourinary: Negative for flank pain and hematuria.   Musculoskeletal: Positive for back pain.   Skin: Positive for pallor.   Neurological: Negative for facial asymmetry and speech difficulty.   Psychiatric/Behavioral: Negative for agitation, behavioral problems, confusion and hallucinations.           Medications:   Scheduled Meds:  dexamethasone 4 mg Oral Q12H   lidocaine 1 patch Transdermal Q24H   sodium chloride 10 mL Intravenous Q12H   sodium chloride 3 mL Intravenous Q12H       Continuous Infusions:  sodium chloride 75 mL/hr Last Rate: 75 mL/hr (12/12/18 0715)       Labs:     Lab Results (last 72 hours)     Procedure Component Value Units Date/Time    Iron Profile [[id number]] Collected:  12/12/18 0709    Specimen:  Blood Updated:  12/12/18 0717    Tissue Pathology Exam [[id number]] Collected:  12/10/18 1702    Specimen:  Tissue from Spine, Lumbar Updated:  12/11/18 1222    Tissue Pathology Exam " [[id number]] Collected:  12/10/18 1659    Specimen:  Tissue from Labia, Tissue from Labia, Tissue from Labia Updated:  12/11/18 1220    Vitamin B12 [[id number]]  (Abnormal) Collected:  12/10/18 0906    Specimen:  Blood Updated:  12/11/18 1000     Vitamin B-12 214 pg/mL     Folate [[id number]] Collected:  12/10/18 0906    Specimen:  Blood Updated:  12/11/18 1000     Folate 5.02 ng/mL     Ferritin [[id number]]  (Normal) Collected:  12/10/18 0906    Specimen:  Blood Updated:  12/11/18 0930     Ferritin 106.00 ng/mL     Basic Metabolic Panel [[id number]]  (Abnormal) Collected:  12/11/18 0549    Specimen:  Blood Updated:  12/11/18 0631     Glucose 87 mg/dL      BUN 11 mg/dL      Creatinine 0.59 mg/dL      Sodium 133 mmol/L      Potassium 4.0 mmol/L      Chloride 96 mmol/L      CO2 29.0 mmol/L      Calcium 8.6 mg/dL      eGFR Non African Amer 101 mL/min/1.73      BUN/Creatinine Ratio 18.6     Anion Gap 8.0 mmol/L     Narrative:       GFR Normal >60  Chronic Kidney Disease <60  Kidney Failure <15    CBC & Differential [[id number]] Collected:  12/11/18 0549    Specimen:  Blood Updated:  12/11/18 0621    Narrative:       The following orders were created for panel order CBC & Differential.  Procedure                               Abnormality         Status                     ---------                               -----------         ------                     CBC Auto Differential[[id number]]        Abnormal            Final result                 Please view results for these tests on the individual orders.    CBC Auto Differential [[id number]]  (Abnormal) Collected:  12/11/18 0549    Specimen:  Blood Updated:  12/11/18 0621     WBC 13.81 10*3/mm3      RBC 4.03 10*6/mm3      Hemoglobin 11.7 g/dL      Hematocrit 34.8 %      MCV 86.4 fL      MCH 29.0 pg      MCHC 33.6 g/dL      RDW 14.1 %      RDW-SD 44.3 fl      MPV 9.6 fL      Platelets 444 10*3/mm3      Neutrophil % 68.2 %      Lymphocyte % 19.3 %      Monocyte % 10.6 %       Eosinophil % 1.2 %      Basophil % 0.2 %      Immature Grans % 0.5 %      Neutrophils, Absolute 9.41 10*3/mm3      Lymphocytes, Absolute 2.66 10*3/mm3      Monocytes, Absolute 1.47 10*3/mm3      Eosinophils, Absolute 0.17 10*3/mm3      Basophils, Absolute 0.03 10*3/mm3      Immature Grans, Absolute 0.07 10*3/mm3      nRBC 0.0 /100 WBC     CEA [[id number]]  (Abnormal) Collected:  12/10/18 0906    Specimen:  Blood Updated:  12/10/18 1043     CEA 11.60 ng/mL     Urine Culture - Urine, [[id number]]  (Abnormal) Collected:  12/08/18 2319    Specimen:  Urine Updated:  12/10/18 0623     Urine Culture >100,000 CFU/mL Mixed Gram Positive Sandra    Narrative:       Probable contaminant            Radiology:     Imaging Results (last 72 hours)     Procedure Component Value Units Date/Time    XR Chest 1 View [[id number]] Collected:  12/11/18 1746     Updated:  12/11/18 1752    Narrative:       Exam:   XR CHEST 1 VW-       Date:  12/11/2018      History:  Female, age  68 years;post port placement pt in pacu #15;  M84.58XA-Pathological fracture in neoplastic disease, other specified  site, initial encounter for fracture; C80.1-Malignant (primary)  neoplasm, unspecified; C79.51-Secondary malignant neoplasm of bone;  C80.1-Malignant (primary) neoplasm, unspecified; N90.89-Other specified  noninflammatory disorders of vulva and perineum; Z74.09-Other reduced  mobility; R68.89-Ot     COMPARISON:  CT chest dated 12/8/2018.     Findings :     The heart and mediastinum are similar in size. Opacity projecting over  the left lower lobe, reflecting known lung mass. Left-sided chest port,  tip projecting over the mid SVC, new, without measurable pneumothorax.  No obvious new pleural effusion identified with similar blunting of the  left costophrenic angle. The bones show no acute pathology.         Impression:       Impression:     1.  Interval placement of left-sided chest port without measurable  pneumothorax.  2.  No obvious developing  pleural effusion with similar blunting of the  costophrenic angle and known left lower lobe mass.     This report was finalized on 12/11/2018 17:49 by Dr. Rosaline Lechuga MD.    XR Chest Post CVA Port [[id number]] Collected:  12/11/18 1724     Updated:  12/11/18 1728    Narrative:       INTRAOPERATIVE FLUOROSCOPIC GUIDANCE 12/11/2018      INDICATION: Intraoperative fluoroscopic guidance. Chest port placement      TECHNIQUE: Single fluoroscopic image from the operating room was  submitted for evaluation. Please note, no radiologist was in attendance  for acquisition of these images. These images are available for future  reference to the attending surgeon. Total fluoroscopic time was 3  seconds.      FINDINGS: Single intraoperative image related to placement of the chest  port placement. Catheter tip seen projecting over the expected location  of the mid SVC.        Impression:       1. Intraoperative fluoroscopic guidance as described.   2. Please refer to real-time fluoroscopy and operative report for full  details.  This report was finalized on 12/11/2018 17:25 by Dr. Rosaline Lechuga MD.    FL C Arm During Surgery [[id number]] Updated:  12/11/18 1718    MRI Brain With & Without Contrast [[id number]] Collected:  12/11/18 1546     Updated:  12/11/18 1604    Narrative:       EXAMINATION:  MRI BRAIN W WO CONTRAST-  12/11/2018 1:14 PM CST     HISTORY: cancer staging; M84.58XA-Pathological fracture in neoplastic  disease, other specified site, initial encounter for fracture;  C80.1-Malignant (primary) neoplasm, unspecified; C79.51-Secondary  malignant neoplasm of bone; C80.1-Malignant (primary) neoplasm,  unspecified; N90.89-Other specified noninflammatory disorders of vulva  and perineum; Z74.09-Other reduced mobility; R68.89-Other general  symptoms      COMPARISON: No comparison study.     TECHNIQUE: Multiplanar MR imaging the brain was performed. Gadolinium  enhanced imaging obtained.     FINDINGS:      There is large  amount of image quadrant degradation related to patient  motion particularly on the gallium contrast T1-weighted imaging  unfortunately.     In the superior pao, to the left of midline there is focal area of  gadolinium enhancement identified measuring nearly 5 mm. There is  corresponding T2 signal hyperintensity and restricted diffusion most  worrisome for brain metastases. There is low signal appreciated on the  gradient echo imaging sequence worrisome for hemosiderin/blood products.     There is also punctate area of increased enhancement observed in the  midbrain, just to the right of midline anteriorly near the middle  cerebral peduncle again worrisome for metastatic lesion.     In the left temporal lobe, there is a punctate area of restricted  diffusion with a similar punctate restricted diffusion noted in the left  parietal lobe.     There is extensive artifact on the postcontrast studies and correlation  for gadolinium enhancement is challenging at best. A Small amount of  punctate enhancement is suspected in the left parietal lobe region on  the coronal images.     There is punctate low signal appreciated on gradient echo imaging in the  right occipital lobe and small area of hemosiderin considered.     There is no significant mass effect or midline shift are edema signal.     There are multiple punctate FLAIR signal hyperintensities in the  periventricular and subcortical white matter compatible with chronic  ischemic changes.             Impression:       1. Significant image quality degradation related to patient motion  artifact.  2. Small metastatic lesions suspected as described above, particularly  in the pao. Hemorrhagic changes involving the left pontine lesion  suspected.  This report was finalized on 12/11/2018 16:01 by Dr. Alan Garcia MD.    XR Spine Lumbar AP & Lateral [[id number]] Collected:  12/10/18 1753     Updated:  12/11/18 0716    Narrative:       INTRAOPERATIVE FLUOROSCOPIC GUIDANCE  12/10/2018      INDICATION: Intraoperative fluoroscopic guidance. Kyphoplasty      TECHNIQUE: 2 fluoroscopic images from the operating room were submitted  for evaluation. Please note, no radiologist was in attendance for  acquisition of these images. These images are available for future  reference to the attending surgeon. Total fluoroscopic time was 1.3  minutes.      FINDINGS: Intraoperative images related to lumbar kyphoplasty.        Impression:       1. Intraoperative fluoroscopic guidance as described.   2. Please refer to real-time fluoroscopy and operative report for full  details.  This report was finalized on 12/10/2018 17:55 by Dr. Rosaline Lechuga MD.    FL C Arm During Surgery [[id number]] Collected:  12/10/18 1753     Updated:  12/11/18 0716    Narrative:       INTRAOPERATIVE FLUOROSCOPIC GUIDANCE 12/10/2018      INDICATION: Intraoperative fluoroscopic guidance. Kyphoplasty      TECHNIQUE: 2 fluoroscopic images from the operating room were submitted  for evaluation. Please note, no radiologist was in attendance for  acquisition of these images. These images are available for future  reference to the attending surgeon. Total fluoroscopic time was 1.3  minutes.      FINDINGS: Intraoperative images related to lumbar kyphoplasty.        Impression:       1. Intraoperative fluoroscopic guidance as described.   2. Please refer to real-time fluoroscopy and operative report for full  details.  This report was finalized on 12/10/2018 17:55 by Dr. Rosaline Lechuga MD.    NM Bone Scan Whole Body [[id number]] Collected:  12/10/18 1425     Updated:  12/10/18 1439    Narrative:       HISTORY: Large adrenal masses. Left lower lobe mass lesion with  intrathoracic lymphadenopathy.     Nuclear medicine bone scan: Following IV injection of 20.8 mCi of  technetium 99m HDP, delayed anterior and posterior whole body images are  obtained.     No intense abnormal tracer uptake is identified within the L3 vertebra  which  "suggests no osteoblastic activity at this site. There is arthritic  uptake noted within the shoulders and knees ankles and feet.  Asymmetrical increased tracer uptake is noted within the left glenoid  which could be degenerative as the arthritic changes are more prominent  on the left compared to the right with the CT chest exam of 12/8/2018.     Physiologic activity seen within the kidneys and the bladder.        Impression:       .   1. Only minimal tracer activity identified within the L3 vertebra on the  anterior images. The lack of intense uptake at the site of the L3  vertebral fracture indicates no significant osteoblastic activity at  this level.  2. Asymmetrical increased tracer activity of the left shoulder/glenoid  may be degenerative. Further assessment with MRI could be performed if  the presence of metastasis at this site would alter management.  This report was finalized on 12/10/2018 14:36 by Dr. Michelle Naranjo MD.            Objective:   Vitals: /87 (BP Location: Left arm, Patient Position: Lying)   Pulse 105   Temp 98 °F (36.7 °C) (Oral)   Resp 18   Ht 152.4 cm (60\")   Wt 62.3 kg (137 lb 6.4 oz)   SpO2 97%   BMI 26.83 kg/m²   Physical Exam   Constitutional: She is oriented to person, place, and time. No distress.   Frail looking and thin.   HENT:   Head: Normocephalic and atraumatic.   Eyes: No scleral icterus.   Neck: No JVD present.   Cardiovascular: Normal rate and regular rhythm.   Pulmonary/Chest: No respiratory distress. She has no wheezes.   Port left chest.   Abdominal: Soft. Bowel sounds are normal. There is no tenderness.   Musculoskeletal: She exhibits no edema.   Bilateral SCDs.   Neurological: She is alert and oriented to person, place, and time.   Skin: She is not diaphoretic. There is pallor.   Psychiatric: She has a normal mood and affect. Her behavior is normal. Judgment and thought content normal.   Nursing note and vitals reviewed.    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    POD # 2, LUMBAR LAMINECTOMY WITHOUT FUSION L3 REMOVAL SPINAL TUMOR KYPHOPLASTY WITH BIOPSY RADIOFREQUENCY TREATMENT TO TUMOR. VULVA BIOPSY  2.    Widely metastatic malignancy, agree likely lung primary with small cell carcinoma at the forefront of all the differential possibilities.   · Tumor stage: IV or extensive stage (cT4 cN2 M1).  · Tumor burden: 5.6 x 5.7 cm mass within the left lower lobe with 1.3 cm peripheral satellite lesion in the left lower lobe, pleural based paraspinal mass in the medial left lung, 2.2 x 1.5 cm contiguous with infrahilar lymphadenopathy, and 2.5 x 3.9 cm contiguous mass posterior to the left atrium inseparable from the esophagus. 4.9 x 2.9 cm prevascular node. Effacement of the distal left main pulmonary artery along its posterior margin related to the hilar component of the neoplasm. Bilateral adrenal metastases (5.5 x 6.1 on the left and 6.7 x 5.4 cm on the right). Interaortocaval node 1.9 x 2.3 cm. Pleural studding. Ill-defined hypodense lesion involving the posterior interpolar aspect of the left kidney suspicious for metastatic disease. L3 vertebral body epidural mass associated with extension into the ventral epidural space (1.4 x 1.0 cm extradural mass associated with effacement of the thecal sac).   · Complications of tumor: Acute 2 column fracture involving L3 vertebral body involving the anterior and middle column and right pedicle with associated edema. Resultant myelopathic symptoms with radicular pain to the right leg and inability to walk.  · Tumor status: Untreated. Tissue diagnosis  pending.  · Prognosis: Poor.  3.    Vulvar/right labial mass/lesion. 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  4.    Anemia, of chronic disease/malignancy/B12 deficiency.  5.    Thrombocytosis, likely reactive  6.    Long-standing tobacco smoker (1/2 pack per day since age 18).  7.    Coronary artery calcifications (right coronary left anterior descending (LAD) and circumflex distributions).  8.    Poor performance status (ECOG 3).  9.    Poor overall prognosis.     RECOMMENDATIONS:  1.    Case discussed with patient, nurse Hodan, daughter in law Clau, and son Lele.  Await pathology report to direct therapy.  Poor overall prognosis.  2.    Brain MRI showed small metastatic lesions suspected. On Decadron.   3.    Await iron panel.   4.    Start B12 shots.  Can continue as outpatient.  5.    Continue general medical support.   6.    Radiation oncology consult pending.  7.    Home with outpatient follow-up when ok with others.  8.    Clinic appointment arranged.

## 2018-12-12 NOTE — PROGRESS NOTES
Continued Stay Note   [facility]     Patient Name: Kasey Rios  MRN: [id number]  Today's Date: 12/12/2018    Admit Date: 12/8/2018    Discharge Plan     Row Name 12/12/18 1525       Plan    Plan  Home vs rehab    Patient/Family in Agreement with Plan  yes    Plan Comments  Spoke with pt about d/c planning and rehab. She said she will consider rehab but does not really want to go. She said she will talk with her family. Pt's nurse, Hodan, mentioned family may take pt home to a son's house. Spoke with her daughter in law Clau and that is something that is being considered. Will follow.         Discharge Codes    No documentation.             VADIM Nicholas

## 2018-12-12 NOTE — PROGRESS NOTES
Kasey Rios  68 y.o.      Chief complaint:   Back pain     Subjective  Overall back pain and right lower extremity hip and leg pain are improved.   She also had a port placed yesterday.    Temp:  [97.6 °F (36.4 °C)-98.9 °F (37.2 °C)] 98 °F (36.7 °C)  Heart Rate:  [] 105  Resp:  [16-18] 18  BP: (140-180)/(79-97) 180/87      Objective    Neurologic Exam     Mental Status   Oriented to person, place, and time.   Attention: normal.   Speech: speech is normal   Level of consciousness: alert  Knowledge: good.     Cranial Nerves   Cranial nerves II through XII intact.     Motor Exam   Muscle bulk: normal  Overall muscle tone: normal  Right arm pronator drift: absent  Left arm pronator drift: absent    Sensory Exam   Light touch normal.   Pinprick normal.     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)    Compression fracture of L3 lumbar vertebra (CMS/HCC)      Lab Results (last 24 hours)     Procedure Component Value Units Date/Time    Iron Profile [[id number]] Collected:  12/12/18 0709    Specimen:  Blood Updated:  12/12/18 0717    Tissue Pathology Exam [[id number]] Collected:  12/10/18 1702    Specimen:  Tissue from Spine, Lumbar Updated:  12/11/18 1222    Tissue Pathology Exam [[id number]] Collected:  12/10/18 1659    Specimen:  Tissue from Labia, Tissue from Labia, Tissue from Labia Updated:  12/11/18 1220    Vitamin B12 [[id number]]  (Abnormal) Collected:  12/10/18 0906    Specimen:  Blood Updated:  12/11/18 1000     Vitamin B-12 214 pg/mL     Folate [[id number]] Collected:  12/10/18 0906    Specimen:  Blood Updated:  12/11/18 1000     Folate 5.02 ng/mL     Ferritin [[id number]]  (Normal) Collected:  12/10/18 0906    Specimen:  Blood Updated:  12/11/18 0930     Ferritin 106.00  ng/mL               Plan:   Metastatic cancer s/p biopsy, kyphoplasty and laminectomy. Mobilize with PT. Path pending, discharge planning.    Erwin Benitez MD

## 2018-12-12 NOTE — PLAN OF CARE
Problem: Skin Injury Risk (Adult)  Goal: Skin Health and Integrity  Outcome: Ongoing (interventions implemented as appropriate)   12/12/18 0416   Skin Injury Risk (Adult)   Skin Health and Integrity making progress toward outcome       Problem: Patient Care Overview  Goal: Plan of Care Review  Outcome: Ongoing (interventions implemented as appropriate)   12/12/18 0416   Coping/Psychosocial   Plan of Care Reviewed With patient   Plan of Care Review   Progress improving   OTHER   Outcome Summary Pt c/o pain x2 this so far this shift; prn pain meds given. IVF cont as ordered. Drsg to back c/d/i. CAIT remains in place. Voiding. SCD's on. Numbing spray in use for ilsa area. L port placement today; not accessed, drsg c/d/i. VSS. Will cont to monitor.      Goal: Individualization and Mutuality  Outcome: Ongoing (interventions implemented as appropriate)   12/12/18 0416   Individualization   Patient Specific Preferences Keep R leg elevated    Patient Specific Goals (Include Timeframe) Keep pain at tolerabla level   Patient Specific Interventions Prn pain meds; numbing spray in use      Goal: Discharge Needs Assessment  Outcome: Ongoing (interventions implemented as appropriate)   12/08/18 1100 12/10/18 1414   Discharge Needs Assessment   Readmission Within the Last 30 Days --  no previous admission in last 30 days   Concerns to be Addressed --  adjustment to diagnosis/illness   Patient/Family Anticipates Transition to --  home with family   Patient/Family Anticipated Services at Transition none --    Transportation Anticipated --  family or friend will provide   Anticipated Changes Related to Illness --  none   Equipment Needed After Discharge --  none   Outpatient/Agency/Support Group Needs --  homecare agency   Discharge Coordination/Progress --  Pt lives at home and her daughter and/or grandson stay with her. She plans to go home at d/c. She does have rx coverage. She does not use DME. Pt not sure if she will need home  health or not.    Disability   Equipment Currently Used at Home --  none     Goal: Interprofessional Rounds/Family Conf  Outcome: Ongoing (interventions implemented as appropriate)   12/12/18 0416   Interdisciplinary Rounds/Family Conf   Participants nursing;patient;family       Problem: Fall Risk (Adult)  Goal: Absence of Fall  Outcome: Ongoing (interventions implemented as appropriate)   12/12/18 0416   Fall Risk (Adult)   Absence of Fall making progress toward outcome       Problem: Surgery Nonspecified (Adult)  Goal: Signs and Symptoms of Listed Potential Problems Will be Absent, Minimized or Managed (Surgery Nonspecified)  Outcome: Ongoing (interventions implemented as appropriate)   12/11/18 0546 12/11/18 1455   Goal/Outcome Evaluation   Problems Assessed (Surgery) --  all   Problems Present (Surgery) pain --      Goal: Anesthesia/Sedation Recovery  Outcome: Outcome(s) achieved Date Met: 12/12/18 12/12/18 0416   Goal/Outcome Evaluation   Anesthesia/Sedation Recovery criteria met for discharge

## 2018-12-12 NOTE — PROGRESS NOTES
Marylu Rios  : 1950  MRN: [id number]  [facility]: [id number]    Post-operative Day #2  Subjective   Medications being utilized for pain control: narcotic analgesics including morphine.  Patient reports pain is well-controlled.  Patient reports that minimal spotting from biopsy site. Derma plast seems to be helping.      Objective     Min/max vitals past 24 hours:   Temp  Min: 97.6 °F (36.4 °C)  Max: 98.9 °F (37.2 °C)  BP  Min: 140/83  Max: 187/100  Pulse  Min: 87  Max: 105  Resp  Min: 16  Max: 18        I/O last 3 completed shifts:  In: 1499.9 [I.V.:1499.9]  Out: 5865 [Urine:5700; Drains:165]    General: well developed; well nourished  no acute distress   Lungs:  Abdomen: breathing is unlabored  Not performed.   Pelvic: Scant discharge noted, area healing well   Ext: Calves NT     WBC   Date/Time Value Ref Range Status   2018 0549 13.81 (H) 4.80 - 10.80 10*3/mm3 Final     Hemoglobin   Date/Time Value Ref Range Status   2018 0549 11.7 (L) 12.0 - 16.0 g/dL Final     Hematocrit   Date/Time Value Ref Range Status   2018 0549 34.8 (L) 37.0 - 47.0 % Final     Platelets   Date/Time Value Ref Range Status   2018 0549 444 (H) 130 - 400 10*3/mm3 Final        Assessment   1. Post-op Day #2 S/P vulvar biopsies and kyphplasty     Plan   1. Continue dermaplast at this time.    2. Will continue to follow and await pathology biopsies.     Bronwyn Mccullough DO  2018  8:13 AM

## 2018-12-12 NOTE — PROGRESS NOTES
This patient is status post kyphoplasty and microwave ablation for metastatic disease at L3 vertebral body.    We will see her in the outpatient setting in 2-3 weeks for consideration of postoperative palliative radiotherapy to this region.

## 2018-12-13 VITALS
HEART RATE: 89 BPM | DIASTOLIC BLOOD PRESSURE: 93 MMHG | OXYGEN SATURATION: 92 % | HEIGHT: 60 IN | SYSTOLIC BLOOD PRESSURE: 178 MMHG | WEIGHT: 137.35 LBS | BODY MASS INDEX: 26.97 KG/M2 | TEMPERATURE: 98 F | RESPIRATION RATE: 16 BRPM

## 2018-12-13 LAB
BASOPHILS # BLD AUTO: 0.02 10*3/MM3 (ref 0–0.2)
BASOPHILS NFR BLD AUTO: 0.1 % (ref 0–2)
DEPRECATED RDW RBC AUTO: 44.3 FL (ref 40–54)
EOSINOPHIL # BLD AUTO: 0.02 10*3/MM3 (ref 0–0.7)
EOSINOPHIL NFR BLD AUTO: 0.1 % (ref 0–4)
ERYTHROCYTE [DISTWIDTH] IN BLOOD BY AUTOMATED COUNT: 14.1 % (ref 12–15)
HCT VFR BLD AUTO: 33.9 % (ref 37–47)
HGB BLD-MCNC: 11.4 G/DL (ref 12–16)
IMM GRANULOCYTES # BLD: 0.09 10*3/MM3 (ref 0–0.03)
IMM GRANULOCYTES NFR BLD: 0.7 % (ref 0–5)
LYMPHOCYTES # BLD AUTO: 1.69 10*3/MM3 (ref 0.72–4.86)
LYMPHOCYTES NFR BLD AUTO: 12.6 % (ref 15–45)
MCH RBC QN AUTO: 29.2 PG (ref 28–32)
MCHC RBC AUTO-ENTMCNC: 33.6 G/DL (ref 33–36)
MCV RBC AUTO: 86.7 FL (ref 82–98)
MONOCYTES # BLD AUTO: 0.77 10*3/MM3 (ref 0.19–1.3)
MONOCYTES NFR BLD AUTO: 5.8 % (ref 4–12)
NEUTROPHILS # BLD AUTO: 10.77 10*3/MM3 (ref 1.87–8.4)
NEUTROPHILS NFR BLD AUTO: 80.7 % (ref 39–78)
NRBC BLD MANUAL-RTO: 0 /100 WBC (ref 0–0)
PLATELET # BLD AUTO: 382 10*3/MM3 (ref 130–400)
PMV BLD AUTO: 9.5 FL (ref 6–12)
RBC # BLD AUTO: 3.91 10*6/MM3 (ref 4.2–5.4)
WBC NRBC COR # BLD: 13.36 10*3/MM3 (ref 4.8–10.8)

## 2018-12-13 PROCEDURE — 63710000001 DEXAMETHASONE PER 0.25 MG: Performed by: NEUROLOGICAL SURGERY

## 2018-12-13 PROCEDURE — 94799 UNLISTED PULMONARY SVC/PX: CPT

## 2018-12-13 PROCEDURE — G8980 MOBILITY D/C STATUS: HCPCS | Performed by: PHYSICAL THERAPIST

## 2018-12-13 PROCEDURE — 97164 PT RE-EVAL EST PLAN CARE: CPT | Performed by: PHYSICAL THERAPIST

## 2018-12-13 PROCEDURE — 94760 N-INVAS EAR/PLS OXIMETRY 1: CPT

## 2018-12-13 PROCEDURE — 25010000002 MORPHINE PER 10 MG: Performed by: NEUROLOGICAL SURGERY

## 2018-12-13 PROCEDURE — G8978 MOBILITY CURRENT STATUS: HCPCS | Performed by: PHYSICAL THERAPIST

## 2018-12-13 PROCEDURE — 85025 COMPLETE CBC W/AUTO DIFF WBC: CPT | Performed by: INTERNAL MEDICINE

## 2018-12-13 PROCEDURE — G8979 MOBILITY GOAL STATUS: HCPCS | Performed by: PHYSICAL THERAPIST

## 2018-12-13 PROCEDURE — 99024 POSTOP FOLLOW-UP VISIT: CPT | Performed by: NURSE PRACTITIONER

## 2018-12-13 PROCEDURE — 25010000002 CYANOCOBALAMIN PER 1000 MCG: Performed by: INTERNAL MEDICINE

## 2018-12-13 RX ORDER — PANTOPRAZOLE SODIUM 20 MG/1
20 TABLET, DELAYED RELEASE ORAL DAILY
Qty: 30 TABLET | Refills: 2 | Status: SHIPPED | OUTPATIENT
Start: 2018-12-13

## 2018-12-13 RX ORDER — OXYCODONE AND ACETAMINOPHEN 10; 325 MG/1; MG/1
2 TABLET ORAL EVERY 6 HOURS PRN
Qty: 240 TABLET | Refills: 0 | Status: SHIPPED | OUTPATIENT
Start: 2018-12-13 | End: 2018-12-19 | Stop reason: SDUPTHER

## 2018-12-13 RX ORDER — OXYCODONE AND ACETAMINOPHEN 7.5; 325 MG/1; MG/1
1 TABLET ORAL EVERY 4 HOURS PRN
Qty: 180 TABLET | Refills: 0 | Status: SHIPPED | OUTPATIENT
Start: 2018-12-13 | End: 2018-12-13 | Stop reason: HOSPADM

## 2018-12-13 RX ORDER — ALPRAZOLAM 0.5 MG/1
0.5 TABLET ORAL EVERY 8 HOURS PRN
Qty: 90 TABLET | Refills: 0 | Status: SHIPPED | OUTPATIENT
Start: 2018-12-13 | End: 2018-12-19

## 2018-12-13 RX ADMIN — PANTOPRAZOLE SODIUM 20 MG: 20 TABLET, DELAYED RELEASE ORAL at 06:45

## 2018-12-13 RX ADMIN — MORPHINE SULFATE 2 MG: 2 INJECTION, SOLUTION INTRAMUSCULAR; INTRAVENOUS at 07:18

## 2018-12-13 RX ADMIN — DEXAMETHASONE 4 MG: 4 TABLET ORAL at 08:37

## 2018-12-13 RX ADMIN — CYANOCOBALAMIN 1000 MCG: 1000 INJECTION, SOLUTION INTRAMUSCULAR at 14:17

## 2018-12-13 RX ADMIN — OXYCODONE HYDROCHLORIDE AND ACETAMINOPHEN 1 TABLET: 7.5; 325 TABLET ORAL at 08:37

## 2018-12-13 RX ADMIN — OXYCODONE HYDROCHLORIDE AND ACETAMINOPHEN 1 TABLET: 7.5; 325 TABLET ORAL at 04:13

## 2018-12-13 RX ADMIN — MORPHINE SULFATE 2 MG: 2 INJECTION, SOLUTION INTRAMUSCULAR; INTRAVENOUS at 10:15

## 2018-12-13 RX ADMIN — LIDOCAINE 1 PATCH: 50 PATCH CUTANEOUS at 10:15

## 2018-12-13 RX ADMIN — MORPHINE SULFATE 2 MG: 2 INJECTION, SOLUTION INTRAMUSCULAR; INTRAVENOUS at 16:36

## 2018-12-13 RX ADMIN — MORPHINE SULFATE 2 MG: 2 INJECTION, SOLUTION INTRAMUSCULAR; INTRAVENOUS at 01:47

## 2018-12-13 RX ADMIN — OXYCODONE HYDROCHLORIDE AND ACETAMINOPHEN 1 TABLET: 7.5; 325 TABLET ORAL at 13:37

## 2018-12-13 NOTE — PROGRESS NOTES
Continued Stay Note   Marylu     Patient Name: Kasey Rios  MRN: [id number]  Today's Date: 12/13/2018    Admit Date: 12/8/2018    Discharge Plan     Row Name 12/13/18 1020       Plan    Plan  Home with [facility]     Patient/Family in Agreement with Plan  yes    Final Discharge Disposition Code  06 - home with home health care    Final Note  Pt is being d/c'ed home today. She has orders for home health and dme. Spoke with her about this and discussed options. She chose [facility] . Notified Negar [phone] with [facility]. She chose [facility] for her DME (w/c, hospital bed, and bsc). They will deliver to pt's son's home.         Discharge Codes    No documentation.       Expected Discharge Date and Time     Expected Discharge Date Expected Discharge Time    Dec 13, 2018             VADIM Nicholas

## 2018-12-13 NOTE — PLAN OF CARE
Problem: Skin Injury Risk (Adult)  Goal: Skin Health and Integrity  Outcome: Ongoing (interventions implemented as appropriate)      Problem: Patient Care Overview  Goal: Plan of Care Review  Outcome: Ongoing (interventions implemented as appropriate)  Continue to have a lot of pain today.  Attempting to control with Iv and po medications.  Plan for possible discharge home tomorrow    Problem: Pain, Acute (Adult)  Goal: Identify Related Risk Factors and Signs and Symptoms  Outcome: Ongoing (interventions implemented as appropriate)  Continue with pain today.  Continue with Iv Morphine and po percocet to control pain.      Problem: Fall Risk (Adult)  Goal: Absence of Fall  Outcome: Ongoing (interventions implemented as appropriate)      Problem: Surgery Nonspecified (Adult)  Goal: Signs and Symptoms of Listed Potential Problems Will be Absent, Minimized or Managed (Surgery Nonspecified)  Outcome: Ongoing (interventions implemented as appropriate)  yaneth drain to back discontinued today per Dr. Benitez.  Incision clean dry and intact   12/12/18 2972   Goal/Outcome Evaluation   Problems Assessed (Surgery) all   Problems Present (Surgery) pain

## 2018-12-13 NOTE — PROGRESS NOTES
[facility] Progress Note    Progress Note    Patient:  Kasey Rios  YOB: 1950  Date of Service: 12/13/2018  MRN: [id number]   Acct: [id number]   Primary Care Physician: Provider, No Known  Advance Directive:   Code Status and Medical Interventions:   Ordered at: 12/08/18 0845     Code Status:    CPR     Medical Interventions (Level of Support Prior to Arrest):    Full     Admit Date: 12/8/2018       Hospital Day: 5      Subjective:     Chief Compliant: Back pain 6/10 from 5/10.  Seen with nurse Juan and son Lele.       Review of Systems:   Review of Systems   Constitutional: Positive for fatigue. Negative for chills, diaphoresis and fever.   HENT: Negative for mouth sores and trouble swallowing.    Eyes: Negative for pain and visual disturbance.   Respiratory: Negative for shortness of breath and wheezing.    Cardiovascular: Negative for chest pain and leg swelling.   Gastrointestinal: Negative for abdominal pain, nausea and vomiting.   Genitourinary: Negative for dysuria and flank pain.   Musculoskeletal: Positive for back pain.   Skin: Positive for pallor.   Neurological: Negative for tremors and speech difficulty.   Psychiatric/Behavioral: Negative for agitation, behavioral problems, confusion and hallucinations.           Medications:   Scheduled Meds:  cyanocobalamin 1,000 mcg Intramuscular Daily   dexamethasone 4 mg Oral Q12H   lidocaine 1 patch Transdermal Q24H   pantoprazole 20 mg Oral Q AM   sodium chloride 10 mL Intravenous Q12H   sodium chloride 3 mL Intravenous Q12H       Continuous Infusions:  sodium chloride 75 mL/hr Last Rate: 75 mL/hr (12/12/18 2003)       Labs:     Lab Results (last 72 hours)     Procedure Component Value Units Date/Time    CBC & Differential [[id number]] Collected:  12/13/18 0416    Specimen:  Blood Updated:  12/13/18 0442    Narrative:       The following orders were created for panel order CBC & Differential.  Procedure                                Abnormality         Status                     ---------                               -----------         ------                     CBC Auto Differential[[id number]]        Abnormal            Final result                 Please view results for these tests on the individual orders.    CBC Auto Differential [[id number]]  (Abnormal) Collected:  12/13/18 0416    Specimen:  Blood Updated:  12/13/18 0442     WBC 13.36 10*3/mm3      RBC 3.91 10*6/mm3      Hemoglobin 11.4 g/dL      Hematocrit 33.9 %      MCV 86.7 fL      MCH 29.2 pg      MCHC 33.6 g/dL      RDW 14.1 %      RDW-SD 44.3 fl      MPV 9.5 fL      Platelets 382 10*3/mm3      Neutrophil % 80.7 %      Lymphocyte % 12.6 %      Monocyte % 5.8 %      Eosinophil % 0.1 %      Basophil % 0.1 %      Immature Grans % 0.7 %      Neutrophils, Absolute 10.77 10*3/mm3      Lymphocytes, Absolute 1.69 10*3/mm3      Monocytes, Absolute 0.77 10*3/mm3      Eosinophils, Absolute 0.02 10*3/mm3      Basophils, Absolute 0.02 10*3/mm3      Immature Grans, Absolute 0.09 10*3/mm3      nRBC 0.0 /100 WBC     Iron Profile [[id number]]  (Normal) Collected:  12/12/18 0709    Specimen:  Blood Updated:  12/12/18 0749     Iron 51 mcg/dL      TIBC 256 mcg/dL      Iron Saturation 20 %     Tissue Pathology Exam [[id number]] Collected:  12/10/18 1702    Specimen:  Tissue from Spine, Lumbar Updated:  12/11/18 1222    Tissue Pathology Exam [[id number]] Collected:  12/10/18 1659    Specimen:  Tissue from Labia, Tissue from Labia, Tissue from Labia Updated:  12/11/18 1220    Vitamin B12 [[id number]]  (Abnormal) Collected:  12/10/18 0906    Specimen:  Blood Updated:  12/11/18 1000     Vitamin B-12 214 pg/mL     Folate [[id number]] Collected:  12/10/18 0906    Specimen:  Blood Updated:  12/11/18 1000     Folate 5.02 ng/mL     Ferritin [[id number]]  (Normal) Collected:  12/10/18 0906    Specimen:  Blood Updated:  12/11/18 0930     Ferritin 106.00 ng/mL     Basic Metabolic Panel [[id number]]  (Abnormal)  Collected:  12/11/18 0549    Specimen:  Blood Updated:  12/11/18 0631     Glucose 87 mg/dL      BUN 11 mg/dL      Creatinine 0.59 mg/dL      Sodium 133 mmol/L      Potassium 4.0 mmol/L      Chloride 96 mmol/L      CO2 29.0 mmol/L      Calcium 8.6 mg/dL      eGFR Non African Amer 101 mL/min/1.73      BUN/Creatinine Ratio 18.6     Anion Gap 8.0 mmol/L     Narrative:       GFR Normal >60  Chronic Kidney Disease <60  Kidney Failure <15    CBC & Differential [[id number]] Collected:  12/11/18 0549    Specimen:  Blood Updated:  12/11/18 0621    Narrative:       The following orders were created for panel order CBC & Differential.  Procedure                               Abnormality         Status                     ---------                               -----------         ------                     CBC Auto Differential[[id number]]        Abnormal            Final result                 Please view results for these tests on the individual orders.    CBC Auto Differential [[id number]]  (Abnormal) Collected:  12/11/18 0549    Specimen:  Blood Updated:  12/11/18 0621     WBC 13.81 10*3/mm3      RBC 4.03 10*6/mm3      Hemoglobin 11.7 g/dL      Hematocrit 34.8 %      MCV 86.4 fL      MCH 29.0 pg      MCHC 33.6 g/dL      RDW 14.1 %      RDW-SD 44.3 fl      MPV 9.6 fL      Platelets 444 10*3/mm3      Neutrophil % 68.2 %      Lymphocyte % 19.3 %      Monocyte % 10.6 %      Eosinophil % 1.2 %      Basophil % 0.2 %      Immature Grans % 0.5 %      Neutrophils, Absolute 9.41 10*3/mm3      Lymphocytes, Absolute 2.66 10*3/mm3      Monocytes, Absolute 1.47 10*3/mm3      Eosinophils, Absolute 0.17 10*3/mm3      Basophils, Absolute 0.03 10*3/mm3      Immature Grans, Absolute 0.07 10*3/mm3      nRBC 0.0 /100 WBC     CEA [[id number]]  (Abnormal) Collected:  12/10/18 0906    Specimen:  Blood Updated:  12/10/18 1043     CEA 11.60 ng/mL             Radiology:     Imaging Results (last 72 hours)     Procedure Component Value Units Date/Time     XR Chest Post CVA Port [[id number]] Collected:  12/11/18 1724     Updated:  12/12/18 0752    Narrative:       INTRAOPERATIVE FLUOROSCOPIC GUIDANCE 12/11/2018      INDICATION: Intraoperative fluoroscopic guidance. Chest port placement      TECHNIQUE: Single fluoroscopic image from the operating room was  submitted for evaluation. Please note, no radiologist was in attendance  for acquisition of these images. These images are available for future  reference to the attending surgeon. Total fluoroscopic time was 3  seconds.      FINDINGS: Single intraoperative image related to placement of the chest  port placement. Catheter tip seen projecting over the expected location  of the mid SVC.        Impression:       1. Intraoperative fluoroscopic guidance as described.   2. Please refer to real-time fluoroscopy and operative report for full  details.  This report was finalized on 12/11/2018 17:25 by Dr. Rosaline Lechuga MD.    FL C Arm During Surgery [[id number]] Collected:  12/11/18 1724     Updated:  12/12/18 0752    Narrative:       INTRAOPERATIVE FLUOROSCOPIC GUIDANCE 12/11/2018      INDICATION: Intraoperative fluoroscopic guidance. Chest port placement      TECHNIQUE: Single fluoroscopic image from the operating room was  submitted for evaluation. Please note, no radiologist was in attendance  for acquisition of these images. These images are available for future  reference to the attending surgeon. Total fluoroscopic time was 3  seconds.      FINDINGS: Single intraoperative image related to placement of the chest  port placement. Catheter tip seen projecting over the expected location  of the mid SVC.        Impression:       1. Intraoperative fluoroscopic guidance as described.   2. Please refer to real-time fluoroscopy and operative report for full  details.  This report was finalized on 12/11/2018 17:25 by Dr. Rosaline Lechuga MD.    XR Chest 1 View [[id number]] Collected:  12/11/18 1746     Updated:  12/11/18 1759     Narrative:       Exam:   XR CHEST 1 VW-       Date:  12/11/2018      History:  Female, age  68 years;post port placement pt in pacu #15;  M84.58XA-Pathological fracture in neoplastic disease, other specified  site, initial encounter for fracture; C80.1-Malignant (primary)  neoplasm, unspecified; C79.51-Secondary malignant neoplasm of bone;  C80.1-Malignant (primary) neoplasm, unspecified; N90.89-Other specified  noninflammatory disorders of vulva and perineum; Z74.09-Other reduced  mobility; R68.89-Ot     COMPARISON:  CT chest dated 12/8/2018.     Findings :     The heart and mediastinum are similar in size. Opacity projecting over  the left lower lobe, reflecting known lung mass. Left-sided chest port,  tip projecting over the mid SVC, new, without measurable pneumothorax.  No obvious new pleural effusion identified with similar blunting of the  left costophrenic angle. The bones show no acute pathology.         Impression:       Impression:     1.  Interval placement of left-sided chest port without measurable  pneumothorax.  2.  No obvious developing pleural effusion with similar blunting of the  costophrenic angle and known left lower lobe mass.     This report was finalized on 12/11/2018 17:49 by Dr. Rosaline Lechuga MD.    MRI Brain With & Without Contrast [[id number]] Collected:  12/11/18 1546     Updated:  12/11/18 1604    Narrative:       EXAMINATION:  MRI BRAIN W WO CONTRAST-  12/11/2018 1:14 PM CST     HISTORY: cancer staging; M84.58XA-Pathological fracture in neoplastic  disease, other specified site, initial encounter for fracture;  C80.1-Malignant (primary) neoplasm, unspecified; C79.51-Secondary  malignant neoplasm of bone; C80.1-Malignant (primary) neoplasm,  unspecified; N90.89-Other specified noninflammatory disorders of vulva  and perineum; Z74.09-Other reduced mobility; R68.89-Other general  symptoms      COMPARISON: No comparison study.     TECHNIQUE: Multiplanar MR imaging the brain was  performed. Gadolinium  enhanced imaging obtained.     FINDINGS:      There is large amount of image quadrant degradation related to patient  motion particularly on the gallium contrast T1-weighted imaging  unfortunately.     In the superior pao, to the left of midline there is focal area of  gadolinium enhancement identified measuring nearly 5 mm. There is  corresponding T2 signal hyperintensity and restricted diffusion most  worrisome for brain metastases. There is low signal appreciated on the  gradient echo imaging sequence worrisome for hemosiderin/blood products.     There is also punctate area of increased enhancement observed in the  midbrain, just to the right of midline anteriorly near the middle  cerebral peduncle again worrisome for metastatic lesion.     In the left temporal lobe, there is a punctate area of restricted  diffusion with a similar punctate restricted diffusion noted in the left  parietal lobe.     There is extensive artifact on the postcontrast studies and correlation  for gadolinium enhancement is challenging at best. A Small amount of  punctate enhancement is suspected in the left parietal lobe region on  the coronal images.     There is punctate low signal appreciated on gradient echo imaging in the  right occipital lobe and small area of hemosiderin considered.     There is no significant mass effect or midline shift are edema signal.     There are multiple punctate FLAIR signal hyperintensities in the  periventricular and subcortical white matter compatible with chronic  ischemic changes.             Impression:       1. Significant image quality degradation related to patient motion  artifact.  2. Small metastatic lesions suspected as described above, particularly  in the pao. Hemorrhagic changes involving the left pontine lesion  suspected.  This report was finalized on 12/11/2018 16:01 by Dr. Alan Garcia MD.    XR Spine Lumbar AP & Lateral [[id number]] Collected:  12/10/18 6521      Updated:  12/11/18 0716    Narrative:       INTRAOPERATIVE FLUOROSCOPIC GUIDANCE 12/10/2018      INDICATION: Intraoperative fluoroscopic guidance. Kyphoplasty      TECHNIQUE: 2 fluoroscopic images from the operating room were submitted  for evaluation. Please note, no radiologist was in attendance for  acquisition of these images. These images are available for future  reference to the attending surgeon. Total fluoroscopic time was 1.3  minutes.      FINDINGS: Intraoperative images related to lumbar kyphoplasty.        Impression:       1. Intraoperative fluoroscopic guidance as described.   2. Please refer to real-time fluoroscopy and operative report for full  details.  This report was finalized on 12/10/2018 17:55 by Dr. Rosaline Lechuga MD.    FL C Arm During Surgery [[id number]] Collected:  12/10/18 1753     Updated:  12/11/18 0716    Narrative:       INTRAOPERATIVE FLUOROSCOPIC GUIDANCE 12/10/2018      INDICATION: Intraoperative fluoroscopic guidance. Kyphoplasty      TECHNIQUE: 2 fluoroscopic images from the operating room were submitted  for evaluation. Please note, no radiologist was in attendance for  acquisition of these images. These images are available for future  reference to the attending surgeon. Total fluoroscopic time was 1.3  minutes.      FINDINGS: Intraoperative images related to lumbar kyphoplasty.        Impression:       1. Intraoperative fluoroscopic guidance as described.   2. Please refer to real-time fluoroscopy and operative report for full  details.  This report was finalized on 12/10/2018 17:55 by Dr. Rosaline Lechuga MD.    NM Bone Scan Whole Body [[id number]] Collected:  12/10/18 1425     Updated:  12/10/18 1439    Narrative:       HISTORY: Large adrenal masses. Left lower lobe mass lesion with  intrathoracic lymphadenopathy.     Nuclear medicine bone scan: Following IV injection of 20.8 mCi of  technetium 99m HDP, delayed anterior and posterior whole body images are  obtained.    "  No intense abnormal tracer uptake is identified within the L3 vertebra  which suggests no osteoblastic activity at this site. There is arthritic  uptake noted within the shoulders and knees ankles and feet.  Asymmetrical increased tracer uptake is noted within the left glenoid  which could be degenerative as the arthritic changes are more prominent  on the left compared to the right with the CT chest exam of 12/8/2018.     Physiologic activity seen within the kidneys and the bladder.        Impression:       .   1. Only minimal tracer activity identified within the L3 vertebra on the  anterior images. The lack of intense uptake at the site of the L3  vertebral fracture indicates no significant osteoblastic activity at  this level.  2. Asymmetrical increased tracer activity of the left shoulder/glenoid  may be degenerative. Further assessment with MRI could be performed if  the presence of metastasis at this site would alter management.  This report was finalized on 12/10/2018 14:36 by Dr. Michelle Naranjo MD.            Objective:   Vitals: /88 (BP Location: Left arm, Patient Position: Lying)   Pulse 100   Temp 98 °F (36.7 °C) (Oral)   Resp 20   Ht 152.4 cm (60\")   Wt 62.3 kg (137 lb 5.6 oz)   SpO2 97%   BMI 26.82 kg/m²   Physical Exam   Constitutional: She is oriented to person, place, and time. No distress.   Frail looking.   HENT:   Head: Normocephalic and atraumatic.   Eyes: No scleral icterus.   Neck: No JVD present.   Cardiovascular: Normal rate and regular rhythm.   Pulmonary/Chest: No respiratory distress. She has no wheezes.   + port.    Abdominal: Soft. Bowel sounds are normal. There is no tenderness.   Musculoskeletal: She exhibits no edema.   Neurological: She is alert and oriented to person, place, and time.   Skin: She is not diaphoretic. There is pallor.   Psychiatric: She has a normal mood and affect. Her behavior is normal. Judgment and thought content normal.   Nursing note and vitals " reviewed.    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    POD # 3, LUMBAR LAMINECTOMY WITHOUT FUSION L3 REMOVAL SPINAL TUMOR KYPHOPLASTY WITH BIOPSY RADIOFREQUENCY TREATMENT TO TUMOR. VULVA BIOPSY  2.    Widely metastatic malignancy, agree likely lung primary with small cell carcinoma at the forefront of all the differential possibilities.   · Tumor stage: IV or extensive stage (cT4 cN2 M1).  · Tumor burden: 5.6 x 5.7 cm mass within the left lower lobe with 1.3 cm peripheral satellite lesion in the left lower lobe, pleural based paraspinal mass in the medial left lung, 2.2 x 1.5 cm contiguous with infrahilar lymphadenopathy, and 2.5 x 3.9 cm contiguous mass posterior to the left atrium inseparable from the esophagus. 4.9 x 2.9 cm prevascular node. Effacement of the distal left main pulmonary artery along its posterior margin related to the hilar component of the neoplasm. Bilateral adrenal metastases (5.5 x 6.1 on the left and 6.7 x 5.4 cm on the right). Interaortocaval node 1.9 x 2.3 cm. Pleural studding. Ill-defined hypodense lesion involving the posterior interpolar aspect of the left kidney suspicious for metastatic disease. L3 vertebral body epidural mass associated with extension into the ventral epidural space (1.4 x 1.0 cm extradural mass associated with effacement of the thecal sac).   · Complications of tumor: Acute 2 column fracture involving L3 vertebral body involving the anterior and middle column and right pedicle with associated edema. Resultant myelopathic symptoms with radicular pain to the right leg and inability to walk.  · Tumor status:  Untreated. Tissue diagnosis pending.  · Prognosis: Poor.  3.    Vulvar/right labial mass/lesion. 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  4.    Anemia, of chronic disease/malignancy/B12 deficiency.  5.    Thrombocytosis, likely reactive  6.    Long-standing tobacco smoker (1/2 pack per day since age 18).  7.    Coronary artery calcifications (right coronary left anterior descending (LAD) and circumflex distributions).  8.    Poor performance status (ECOG 3).  9.    Poor overall prognosis.     RECOMMENDATIONS:  1.    Case discussed with patient, nurse Juan, and son Lele.    2.    Await pathology report to direct therapy.    3.    Poor overall prognosis.  4.    Normal iron panel.   5.    Continue  B12 shots.  Can continue as outpatient.  6.    Continue general medical support.   7.    Radiation oncology appointment 2-3 weeks.  8.    Home with outpatient follow-up when ok with others.  9.    Clinic appointment had been arranged.   10.  Above discussed with patient, son, and nurse.  They verbalized understanding.

## 2018-12-13 NOTE — DISCHARGE SUMMARY
Date of Discharge:  12/13/2018    Discharge Diagnosis: Back pain with radiculopathy, metastatic lesion of spine, lung lesion, vaginal lesion, brain lesion    Presenting Problem/History of Present Illness  Compression fracture of L3 lumbar vertebra (CMS/HCC) [S32.030A]       Hospital Course  Patient is a 68 y.o. female presented with back and lower extremity pain that has been progressively getting worse the point where she is having difficulty with ambulation.  He came into the emergency room and was found that she had a lesion in her spine as well as fracture due to the lesion.  Because the concern for cancer of metastatic workup was done which did find a lung lesion as well as lesions in the brain.  Is also found that she had a vaginal as well oncology was consult.  Patient was taken to the operating room or an L3 laminectomy as well as vertebral body biopsy and kyphoplasty with radiofrequency ablation.  She also had vaginal biopsy that was done by Dr. Manas Lugo.  She tolerated procedure well.  Oncology did see and did recommend the patient go for port placement.  Dr. Griffith did place the port on 12/12/2018.  She is ambulate with therapy.  She is tolerating by mouth.  She is tolerating pain medication.  She is voiding spontaneously.  It is felt The Patient Is Stable and Suitable for Discharged Home.  We will send her home with home health nursing and physical and occupational therapy. SHe will follow-up with oncology and radiation oncology.  I will also send the patient home with a wheelchair, hospital bed and a bedside commode.  We will send her home with Percocet 7.5/325 mg 2 pills every 6 hours by mouth for a total of 240 pain pills    Procedures Performed  Procedure(s):  INSERTION VENOUS ACCESS DEVICE       Consults:   Consults     Date and Time Order Name Status Description    12/12/2018 0811 Inpatient Radiation Oncology Consult      12/11/2018 0809 Inpatient General Surgery Consult For Implanted Port  Placement      12/9/2018 0745 Inpatient Oncology Consult Completed     12/8/2018 1638 Inpatient Gynecology Consult Completed           Pertinent Test Results: Pathology results pending    Condition on Discharge:  Stable    Vital Signs  Temp:  [97.8 °F (36.6 °C)-99.7 °F (37.6 °C)] 98 °F (36.7 °C)  Heart Rate:  [] 89  Resp:  [16-20] 16  BP: (158-187)/() 178/93    Physical Exam:   Physical Exam   Constitutional: She is oriented to person, place, and time. She appears well-developed and well-nourished.   HENT:   Head: Normocephalic.   Eyes: EOM are normal. Pupils are equal, round, and reactive to light.   Neck: Normal range of motion.   Pulmonary/Chest: Effort normal.   Musculoskeletal: Normal range of motion.   Neurological: She is alert and oriented to person, place, and time. She has normal strength and normal reflexes. No cranial nerve deficit or sensory deficit. Gait normal. GCS eye subscore is 4. GCS verbal subscore is 5. GCS motor subscore is 6.   Skin: Skin is warm.   Incision clean dry and intact   Psychiatric: She has a normal mood and affect. Her speech is normal and behavior is normal. Thought content normal.        Neurologic Exam     Mental Status   Oriented to person, place, and time.   Speech: speech is normal     Cranial Nerves     CN III, IV, VI   Pupils are equal, round, and reactive to light.  Extraocular motions are normal.     Motor Exam     Strength   Strength 5/5 throughout.          Discharge Disposition  Home or Self Care    Discharge Medications     Discharge Medications      New Medications      Instructions Start Date   ALPRAZolam 0.5 MG tablet  Commonly known as:  XANAX   0.5 mg, Oral, Every 8 Hours PRN      pantoprazole 20 MG EC tablet  Commonly known as:  PROTONIX   20 mg, Oral, Daily         Continue These Medications      Instructions Start Date   aspirin 81 MG chewable tablet   81 mg, Oral, Daily      cyclobenzaprine 10 MG tablet  Commonly known as:  FLEXERIL   10 mg, Oral,  Every 8 Hours PRN      ibuprofen 600 MG tablet  Commonly known as:  ADVIL,MOTRIN   600 mg, Oral, Every 8 Hours PRN      predniSONE 20 MG tablet  Commonly known as:  DELTASONE   20 mg, Oral, 2 Times Daily             Discharge Diet:   Diet Instructions     Diet: Regular; Thin      Discharge Diet:  Regular    Fluid Consistency:  Thin          Activity at Discharge:   Activity Instructions     Discharge Activity Restrictions      1) No driving for 1 week and no longer taking narcotics.   2) Wear back brace when out of bed or sitting in upright chair  3) May shower / sponge bathe in 72 hours.  4) Do not lift, push, or pull          Follow-up Appointments  No future appointments.  Additional Instructions for the Follow-ups that You Need to Schedule     Call MD With Problems / Concerns   As directed      Call with worsening back or leg pain, drainage from wound, fever, or difficulty walking    Order Comments:  Call with worsening back or leg pain, drainage from wound, fever, or difficulty walking          Discharge Follow-up with Specialty: velia resendez np; 3 Weeks   As directed      Specialty:  velia resendez np    Follow Up:  3 Weeks         Referral to Home Health   As directed      Face to Face Visit Date:  12/13/2018    Follow-up Provider for Plan of Care?:  I will be treating the patient on an ongoing basis.  Please send me the Plan of Care for signature.    Follow-up Provider:  VELIA RESENDEZ [[id number]]    Reason/Clinical Findings:  post op    Describe mobility limitations that make leaving home difficult:  post op    Nursing/Therapeutic Services Requested:  Skilled Nursing Physical Therapy Occupational Therapy    Skilled nursing orders:  Medication education Pain management Wound care dressing/changes    Instructions:  may remove dressing on 12/14. do not get wet until December 14.  Clean wound with soap and water once the dressing comes off and leave open to air.  Call with any concerns about the incision     PT orders:  Gait Training Home safety assessment Strengthening    Weight Bearing Status:  Full Weight Bearing    Occupational orders:  Fine motor Strengthening Activities of daily living Home safety assessment    Frequency:  1 Week 1               Test Results Pending at Discharge   Order Current Status    Tissue Pathology Exam In process    Tissue Pathology Exam In process           Pernell Aguiar, ABELINO  12/13/18  7:53 AM    Time: Discharge 30 min

## 2018-12-13 NOTE — THERAPY DISCHARGE NOTE
Acute Care - Physical Therapy RE Eval/Discharge  [facility]     Patient Name: Kasey Rios  : 1950  MRN: [id number]  Today's Date: 2018   Onset of Illness/Injury or Date of Surgery: 18  Date of Referral to PT: 18  Referring Physician: Dr. Benitez      Admit Date: 2018    Visit Dx:    ICD-10-CM ICD-9-CM   1. 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 Malignancy (CMS/HCC) C80.1 199.1   3. 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 Vulvar lesion N90.89 624.8   5. Impaired functional mobility, balance, gait, and endurance Z74.09 V49.89   6. Decreased activities of daily living (ADL) R68.89 780.99   7. 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  yes  -MS     Onset of Illness/Injury or Date of Surgery  18  -MS     Referring Physician  Dr. Benitez  -MS     Patient Observations  alert;cooperative;agree to therapy  -MS     Patient/Family Observations  multiple family members present in rom  -MS     General Observations of Patient  pt fowlers in bed, awake and alert in no  apparent distress. back dressing intact  -MS     Pertinent History of Current Functional Problem   L3 bilateral laminectomy resec L3 bilateral laminectomy resection of epidural tumortion of epidural tumor. s/p port placement yesterday  -MS     Existing Precautions/Restrictions  fall;spinal  -MS     Risks Reviewed  patient:;LOB;nausea/vomiting;dizziness;increased discomfort  -MS     Benefits Reviewed  patient:;improve function;increase independence;increase strength;increase balance;decrease pain;increase knowledge;decrease risk of DVT  -MS     Barriers to Rehab  physical barrier;medically complex  -MS     Row Name 12/13/18 1000          Cognitive Assessment/Intervention- PT/OT    Affect/Mental Status (Cognitive)  WNL  -MS     Orientation Status (Cognition)  oriented x 4  -MS     Follows Commands (Cognition)  WNL  -MS     Personal Safety Interventions  fall prevention program maintained;muscle strengthening facilitated;nonskid shoes/slippers when out of bed;supervised activity  -MS     Row Name 12/13/18 1000          Safety Issues, Functional Mobility    Impairments Affecting Function (Mobility)  endurance/activity tolerance;pain  -MS     Row Name 12/13/18 1000          Bed Mobility Assessment/Treatment    Bed Mobility Assessment/Treatment  supine-sit;sit-supine  -MS     Supine-Sit [place] (Bed Mobility)  conditional independence  -MS     Sit-Supine [place] (Bed Mobility)  conditional independence  -MS     Assistive Device (Bed Mobility)  bed rails;head of bed elevated  -MS     Comment (Bed Mobility)  pt getting hospital bed for home  -MS     Row Name 12/13/18 1000          Transfer Assessment/Treatment    Transfer Assessment/Treatment  sit-stand transfer;stand-sit transfer  -MS     Sit-Stand [place] (Transfers)  supervision  -MS     Stand-Sit [place] (Transfers)  supervision  -MS     Row Name 12/13/18 1000          Sit-Stand Transfer    Assistive Device (Sit-Stand Transfers)  walker,  front-wheeled  -MS     Row Name 12/13/18 1000          Stand-Sit Transfer    Assistive Device (Stand-Sit Transfers)  walker, front-wheeled  -MS     Row Name 12/13/18 1000          Gait/Stairs Assessment/Training    [place] Level (Gait)  contact guard  -MS     Assistive Device (Gait)  walker, front-wheeled  -MS     Distance in Feet (Gait)  75ft, pt began to have significant pain about half way and turned around. She bent over the walker and supported herself on her elbows due to her back pain increasing and radiating down her to her R hip and leg  -MS     Row Name 12/13/18 1000          General ROM    GENERAL ROM COMMENTS  all extremities WFL  -MS     Row Name 12/13/18 1000          MMT (Manual Muscle Testing)    General MMT Comments  R LE fatigues and hurts with activity, otherwise no focal neuro deficit  -MS     Row Name 12/13/18 1000          Sensory Assessment/Intervention    Sensory General Assessment  no sensation deficits identified  -MS     Row Name 12/13/18 1000          Pain Assessment    Additional Documentation  Pain Scale: Numbers Pre/Post-Treatment (Group)  -MS     Row Name 12/13/18 1000          Pain Scale: Numbers Pre/Post-Treatment    Pain Scale: Numbers, Pretreatment  3/10  -MS     Pain Scale: Numbers, Post-Treatment  -- 7.5  -MS     Pain Location - Side  Right  -MS     Pain Location - Orientation  lower  -MS     Pain Location  back radiates into R hip and leg  -MS     Pre/Post Treatment Pain Comment  increased pain with standing and walking  -MS     Pain Intervention(s)  Medication (See MAR);Repositioned;Ambulation/increased activity  -MS     Row Name             Wound 12/08/18 1600 Right labia other (see comments)    Wound - Properties Group Date first assessed: 12/08/18  -SK Time first assessed: 1600  -SK Present On Admission : yes  -SK Side: Right  -SK Location: labia  -SK Type: other (see comments)  -SK    Row Name             Wound 12/10/18 7339 Other (See comments) back incision    Wound  - Properties Group Date first assessed: 12/10/18  -HUGO Time first assessed: 1739  -HUGO Side: Other (See comments)  -HUGO Location: back  -HUGO Type: incision  -HUGO    Row Name             Wound 12/11/18 1658 Other (See comments) neck incision    Wound - Properties Group Date first assessed: 12/11/18  -LH Time first assessed: 1658  -LH Side: Other (See comments)  -LH Location: neck  -LH Type: incision  -LH    Row Name 12/13/18 1000          Physical Therapy Clinical Impression    Date of Referral to PT  12/12/18  -MS     Patient/Family Goals Statement (PT Clinical Impression)  go home today  -MS     Criteria for Skilled Interventions Met (PT Clinical Impression)  yes;treatment indicated however pt is being discharged home  -MS     Row Name 12/13/18 1000          Positioning and Restraints    Post Treatment Position  bed  -MS     In Bed  fowlers;call light within reach;encouraged to call for assist;with family/caregiver;side rails up x2;notified nsg;SCD pump applied  -MS     Row Name 12/13/18 1000          Physical Therapy Discharge Summary    Additional Documentation  Discharge Summary, PT Eval (Group)  -MS     Row Name 12/13/18 1000          Discharge Summary, PT Eval    Reason for Discharge (PT Discharge Summary)  patient discharged from this facility  -MS     Outcomes Achieved Upon Discharge (PT Discharge Summary)  evaluation only  -MS       User Key  (r) = Recorded By, (t) = Taken By, (c) = Cosigned By    Initials Name Provider Type    Nicole Hurtado R, PT, DPT, NCS Physical Therapist    Hodan Acuna RN Registered Nurse    Naheed Calix RN Registered Nurse    Henrietta Falcon RN Registered Nurse          Physical Therapy Education     Title: PT OT SLP Therapies (In Progress)     Topic: Physical Therapy (In Progress)     Point: Mobility training (Done)     Learning Progress Summary           Patient Acceptance, E, VU by MS at 12/11/2018 12:23 PM    Comment:  role of PT in her care, importance of  mobility after surgery to help decrease pain and help increase O2                               User Key     Initials Effective Dates Name Provider Type Discipline    MS 06/19/18 -  Nicole Camargo, PT, DPT, NCS Physical Therapist PT                PT Recommendation and Plan  Anticipated Discharge Disposition (PT): home with assist, home with home health  Planned Therapy Interventions (PT Eval): balance training, gait training, patient/family education, stair training, strengthening, transfer training  Therapy Frequency (PT Clinical Impression): evaluation only  Outcome Summary/Treatment Plan (PT)  Anticipated Equipment Needs at Discharge (PT): bedside commode, hospital bed, front wheeled walker  Anticipated Discharge Disposition (PT): home with assist, home with home health  Reason for Discharge (PT Discharge Summary): patient discharged from this facility  Plan of Care Reviewed With: patient  Progress: no change  Outcome Summary: PT re-evaluation completed due to patient having a port placed yesterday. .The patient has more back pain that radiates down to her right hip and into her right leg with walking. This is significant enough that she has to lean forward on her walker to help releave some pain. The patient has not had pain medication at this time and nursing is getting this for her. The patient is discharging home with assist today and she will need a rolling walker which family states they have available at home.     Outcome Measures     Row Name 12/13/18 1000 12/11/18 1131 12/11/18 1130       How much help from another person do you currently need...    Turning from your back to your side while in flat bed without using bedrails?  4  -MS  4  -MS  --    Moving from lying on back to sitting on the side of a flat bed without bedrails?  4  -MS  3  -MS  --    Moving to and from a bed to a chair (including a wheelchair)?  3  -MS  3  -MS  --    Standing up from a chair using your arms (e.g., wheelchair, bedside  chair)?  3  -MS  3  -MS  --    Climbing 3-5 steps with a railing?  3  -MS  2  -MS  --    To walk in hospital room?  3  -MS  3  -MS  --    AM-PAC 6 Clicks Score  20  -MS  18  -MS  --       How much help from another is currently needed...    Putting on and taking off regular lower body clothing?  --  --  3  -CH    Bathing (including washing, rinsing, and drying)  --  --  3  -CH    Toileting (which includes using toilet bed pan or urinal)  --  --  3  -CH    Putting on and taking off regular upper body clothing  --  --  4  -CH    Taking care of personal grooming (such as brushing teeth)  --  --  4  -CH    Eating meals  --  --  4  -CH    Score  --  --  21  -CH       Functional Assessment    Outcome Measure Options  AM-PAC 6 Clicks Basic Mobility (PT)  -MS  AM-PAC 6 Clicks Basic Mobility (PT)  -MS  AM-PAC 6 Clicks Daily Activity (OT)  -CH      User Key  (r) = Recorded By, (t) = Taken By, (c) = Cosigned By    Initials Name Provider Type     Daisha Rivas, OTR/L Occupational Therapist    MS Nicole Camargo, PT, DPT, NCS Physical Therapist           Time Calculation:   PT Charges     Row Name 12/13/18 1020             Time Calculation    Start Time  0955  -MS      Stop Time  1018  -MS      Time Calculation (min)  23 min  -MS      PT Received On  12/13/18  -MS        User Key  (r) = Recorded By, (t) = Taken By, (c) = Cosigned By    Initials Name Provider Type    MS Nicole Camargo, PT, DPT, NCS Physical Therapist        Therapy Suggested Charges     Code   Minutes Charges    None           Therapy Charges for Today     Code Description Service Date Service Provider Modifiers Qty    67488020843 HC PT MOBILITY CURRENT 12/13/2018 Nicole Camargo, PT, DPT, NCS GP,  1    30832061402 HC PT MOBILITY PROJECTED 12/13/2018 Nicole Camargo, PT, DPT, NCS GP,  1    [id number] HC PT MOBILITY DISCHARGE 12/13/2018 Nicole Camargo, PT, DPT, NCS GP,  1    01703260552 HC PT RE-EVAL ESTABLISHED PLAN 2 12/13/2018 Nicole Camargo,  PT, DPT, NCS GP, KX 1          PT G-Codes  Outcome Measure Options: AM-PAC 6 Clicks Basic Mobility (PT)  AM-PAC 6 Clicks Score: 20  Score: 21  Functional Limitation: Mobility: Walking and moving around  Mobility: Walking and Moving Around Current Status (): At least 20 percent but less than 40 percent impaired, limited or restricted  Mobility: Walking and Moving Around Goal Status (): At least 20 percent but less than 40 percent impaired, limited or restricted  Mobility: Walking and Moving Around Discharge Status (): At least 20 percent but less than 40 percent impaired, limited or restricted    PT Discharge Summary  Anticipated Discharge Disposition (PT): home with assist, home with home health    Nicole Camargo, PT, DPT, NCS  12/13/2018

## 2018-12-13 NOTE — PLAN OF CARE
Problem: Skin Injury Risk (Adult)  Goal: Skin Health and Integrity  Outcome: Ongoing (interventions implemented as appropriate)   12/12/18 1852   Skin Injury Risk (Adult)   Skin Health and Integrity making progress toward outcome       Problem: Patient Care Overview  Goal: Plan of Care Review  Outcome: Ongoing (interventions implemented as appropriate)   12/13/18 0407   Coping/Psychosocial   Plan of Care Reviewed With patient   Plan of Care Review   Progress no change   OTHER   Outcome Summary Pt has been c/o frequent pain this shiftl prn IV and po pain meds given as ordered. IVF cont. Bath given by family last night. L port in placce; not currently accessed. Voiding. VSS. Will cont to monitor.      Goal: Discharge Needs Assessment  Outcome: Ongoing (interventions implemented as appropriate)   12/08/18 1100 12/10/18 1414   Discharge Needs Assessment   Readmission Within the Last 30 Days --  no previous admission in last 30 days   Concerns to be Addressed --  adjustment to diagnosis/illness   Patient/Family Anticipates Transition to --  home with family   Patient/Family Anticipated Services at Transition none --    Transportation Anticipated --  family or friend will provide   Anticipated Changes Related to Illness --  none   Equipment Needed After Discharge --  none   Outpatient/Agency/Support Group Needs --  homecare agency   Discharge Coordination/Progress --  Pt lives at home and her daughter and/or grandson stay with her. She plans to go home at d/c. She does have rx coverage. She does not use DME. Pt not sure if she will need home health or not.    Disability   Equipment Currently Used at Home --  none     Goal: Interprofessional Rounds/Family Conf  Outcome: Ongoing (interventions implemented as appropriate)   12/12/18 0416   Interdisciplinary Rounds/Family Conf   Participants nursing;patient;family       Problem: Pain, Acute (Adult)  Goal: Identify Related Risk Factors and Signs and Symptoms  Outcome: Ongoing  (interventions implemented as appropriate)   12/13/18 0407   Pain, Acute (Adult)   Related Risk Factors (Acute Pain) persistent pain;disease process;positioning   Signs and Symptoms (Acute Pain) verbalization of pain descriptors;facial mask of pain/grimace       Problem: Fall Risk (Adult)  Goal: Absence of Fall  Outcome: Ongoing (interventions implemented as appropriate)   12/13/18 0407   Fall Risk (Adult)   Absence of Fall making progress toward outcome       Problem: Surgery Nonspecified (Adult)  Goal: Signs and Symptoms of Listed Potential Problems Will be Absent, Minimized or Managed (Surgery Nonspecified)  Outcome: Ongoing (interventions implemented as appropriate)   12/12/18 7472   Goal/Outcome Evaluation   Problems Assessed (Surgery) all   Problems Present (Surgery) pain

## 2018-12-13 NOTE — PLAN OF CARE
Problem: Patient Care Overview  Goal: Plan of Care Review  Outcome: Ongoing (interventions implemented as appropriate)   12/13/18 1018   Coping/Psychosocial   Plan of Care Reviewed With patient   Plan of Care Review   Progress no change   OTHER   Outcome Summary PT re-evaluation completed due to patient having a port placed yesterday. .The patient has more back pain that radiates down to her right hip and into her right leg with walking. This is significant enough that she has to lean forward on her walker to help releave some pain. The patient has not had pain medication at this time and nursing is getting this for her. The patient is discharging home with assist today and she will need a rolling walker which family states they have available at home.

## 2018-12-14 ENCOUNTER — READMISSION MANAGEMENT (OUTPATIENT)
Dept: CALL CENTER | Facility: HOSPITAL | Age: 68
End: 2018-12-14

## 2018-12-14 NOTE — OUTREACH NOTE
Prep Survey      Responses   Facility patient discharged from?  [facility]   Is patient eligible?  Yes   Discharge diagnosis   Back pain with radiculopathy, metastatic lesion of spine, lung lesion, vaginal lesion, brain lesion   Does the patient have one of the following disease processes/diagnoses(primary or secondary)?  Other   Does the patient have Home health ordered?  Yes   What is the Home health agency?   [facility]   Is there a DME ordered?  Yes   What DME was ordered?  [facility] - bed and [facility],    Prep survey completed?  Yes          Rukhsana Molina RN

## 2018-12-14 NOTE — THERAPY DISCHARGE NOTE
Acute Care - Physical Therapy Discharge Summary  [facility]       Patient Name: Kasey Rios  : 1950  MRN: [id number]    Today's Date: 2018  Onset of Illness/Injury or Date of Surgery: 18    Date of Referral to PT: 18  Referring Physician: Dr. Benitez      Admit Date: 2018      PT Recommendation and Plan    Visit Dx:    ICD-10-CM ICD-9-CM   1. 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 Malignancy (CMS/HCC) C80.1 199.1   3. 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 Vulvar lesion N90.89 624.8   5. Impaired functional mobility, balance, gait, and endurance Z74.09 V49.89   6. Decreased activities of daily living (ADL) R68.89 780.99   7. Closed compression fracture of L3 lumbar vertebra with routine healing, subsequent encounter S32.030D V54.17       Outcome Measures     Row Name 18 1000 18 1131 18 1130       How much help from another person do you currently need...    Turning from your back to your side while in flat bed without using bedrails?  4  -MS  4  -MS  --    Moving from lying on back to sitting on the side of a flat bed without bedrails?  4  -MS  3  -MS  --    Moving to and from a bed to a chair (including a wheelchair)?  3  -MS  3  -MS  --    Standing up from a chair using your arms (e.g., wheelchair, bedside chair)?  3  -MS  3  -MS  --    Climbing 3-5 steps with a railing?  3  -MS  2  -MS  --    To walk in hospital room?  3  -MS  3  -MS  --    AM-PAC 6 Clicks Score  20  -MS  18  -MS  --       How much help from another is currently needed...    Putting on and taking off regular lower body clothing?  --  --  3  -CH    Bathing (including washing, rinsing, and drying)  --  --  3  -CH    Toileting (which includes using toilet bed pan or urinal)  --  --  3  -CH    Putting on and taking off regular upper body clothing  --  --  4   -CH    Taking care of personal grooming (such as brushing teeth)  --  --  4  -CH    Eating meals  --  --  4  -CH    Score  --  --  21  -CH       Functional Assessment    Outcome Measure Options  AM-PAC 6 Clicks Basic Mobility (PT)  -MS  AM-PAC 6 Clicks Basic Mobility (PT)  -MS  AM-PAC 6 Clicks Daily Activity (OT)  -CH      User Key  (r) = Recorded By, (t) = Taken By, (c) = Cosigned By    Initials Name Provider Type    CH Daisha Rivas, OTR/L Occupational Therapist    Nicole Hurtado, PT, DPT, NCS Physical Therapist            Therapy Suggested Charges     Code   Minutes Charges    None             Rehab Goal Summary     Row Name 12/14/18 0700             Bathing Goal 1 (OT)    Activity/Assistive Device (Bathing Goal 1, OT)  bathing skills, all  -TS      [place] Level/Cues Needed (Bathing Goal 1, OT)  set-up required  -TS      Time Frame (Bathing Goal 1, OT)  long term goal (LTG);by discharge  -TS      Progress/Outcomes (Bathing Goal 1, OT)  goal not met  -TS         Dressing Goal 1 (OT)    Activity/Assistive Device (Dressing Goal 1, OT)  dressing skills, all  -TS      [place]/Cues Needed (Dressing Goal 1, OT)  independent  -TS      Time Frame (Dressing Goal 1, OT)  long term goal (LTG);by discharge  -TS      Progress/Outcome (Dressing Goal 1, OT)  goal not met  -TS        User Key  (r) = Recorded By, (t) = Taken By, (c) = Cosigned By    Initials Name Provider Type Discipline    TS Karmen Calderón, CAGLE/L Occupational Therapy Assistant OT              PT Discharge Summary  Anticipated Discharge Disposition (PT): home  Reason for Discharge: Discharge from facility  Outcomes Achieved: Other(eval only)  Discharge Destination: Home      Lavern CARRIE Segura, PTA   12/14/2018

## 2018-12-14 NOTE — THERAPY DISCHARGE NOTE
Acute Care - Occupational Therapy Discharge Summary  [facility]     Patient Name: Kasey Rios  : 1950  MRN: [id number]    Today's Date: 2018  Onset of Illness/Injury or Date of Surgery: 18    Date of Referral to OT: 12/10/18  Referring Physician: Dr. Benitez      Admit Date: 2018        OT Recommendation and Plan    Visit Dx:    ICD-10-CM ICD-9-CM   1. 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 Malignancy (CMS/HCC) C80.1 199.1   3. 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 Vulvar lesion N90.89 624.8   5. Impaired functional mobility, balance, gait, and endurance Z74.09 V49.89   6. Decreased activities of daily living (ADL) R68.89 780.99   7. Closed compression fracture of L3 lumbar vertebra with routine healing, subsequent encounter S32.030D V54.17               Rehab Goal Summary     Row Name 18 0700             Bathing Goal 1 (OT)    Activity/Assistive Device (Bathing Goal 1, OT)  bathing skills, all  -TS      [place] Level/Cues Needed (Bathing Goal 1, OT)  set-up required  -TS      Time Frame (Bathing Goal 1, OT)  long term goal (LTG);by discharge  -TS      Progress/Outcomes (Bathing Goal 1, OT)  goal not met  -TS         Dressing Goal 1 (OT)    Activity/Assistive Device (Dressing Goal 1, OT)  dressing skills, all  -TS      [place]/Cues Needed (Dressing Goal 1, OT)  independent  -TS      Time Frame (Dressing Goal 1, OT)  long term goal (LTG);by discharge  -TS      Progress/Outcome (Dressing Goal 1, OT)  goal not met  -TS        User Key  (r) = Recorded By, (t) = Taken By, (c) = Cosigned By    Initials Name Provider Type Discipline    TS Karmen Calderón, GURJIT/L Occupational Therapy Assistant OT          Outcome Measures     Row Name 18 1000 18 1131 18 1130       How much help from another person do you currently  need...    Turning from your back to your side while in flat bed without using bedrails?  4  -MS  4  -MS  --    Moving from lying on back to sitting on the side of a flat bed without bedrails?  4  -MS  3  -MS  --    Moving to and from a bed to a chair (including a wheelchair)?  3  -MS  3  -MS  --    Standing up from a chair using your arms (e.g., wheelchair, bedside chair)?  3  -MS  3  -MS  --    Climbing 3-5 steps with a railing?  3  -MS  2  -MS  --    To walk in hospital room?  3  -MS  3  -MS  --    AM-PAC 6 Clicks Score  20  -MS  18  -MS  --       How much help from another is currently needed...    Putting on and taking off regular lower body clothing?  --  --  3  -CH    Bathing (including washing, rinsing, and drying)  --  --  3  -CH    Toileting (which includes using toilet bed pan or urinal)  --  --  3  -CH    Putting on and taking off regular upper body clothing  --  --  4  -CH    Taking care of personal grooming (such as brushing teeth)  --  --  4  -CH    Eating meals  --  --  4  -CH    Score  --  --  21  -CH       Functional Assessment    Outcome Measure Options  AM-PAC 6 Clicks Basic Mobility (PT)  -MS  AM-PAC 6 Clicks Basic Mobility (PT)  -MS  AM-PAC 6 Clicks Daily Activity (OT)  -CH      User Key  (r) = Recorded By, (t) = Taken By, (c) = Cosigned By    Initials Name Provider Type    CH Daisha Rivas, OTR/L Occupational Therapist    Nicole Hurtado, PT, DPT, NCS Physical Therapist          Therapy Suggested Charges     Code   Minutes Charges    None                 OT Discharge Summary  Reason for Discharge: Discharge from facility  Outcomes Achieved: Refer to plan of care for updates on goals achieved  Discharge Destination: Home with assist      DIGNA Streeter  12/14/2018

## 2018-12-15 RX ORDER — CEPHALEXIN 500 MG/1
500 CAPSULE ORAL 4 TIMES DAILY
Qty: 40 CAPSULE | Refills: 0 | Status: SHIPPED | OUTPATIENT
Start: 2018-12-15 | End: 2018-12-25

## 2018-12-17 ENCOUNTER — READMISSION MANAGEMENT (OUTPATIENT)
Dept: CALL CENTER | Facility: HOSPITAL | Age: 68
End: 2018-12-17

## 2018-12-17 ENCOUNTER — TELEPHONE (OUTPATIENT)
Dept: OBSTETRICS AND GYNECOLOGY | Facility: CLINIC | Age: 68
End: 2018-12-17

## 2018-12-17 ENCOUNTER — TELEPHONE (OUTPATIENT)
Dept: NEUROSURGERY | Facility: CLINIC | Age: 68
End: 2018-12-17

## 2018-12-17 LAB
CYTO UR: NORMAL
CYTO UR: NORMAL
LAB AP CASE REPORT: NORMAL
LAB AP CASE REPORT: NORMAL
PATH REPORT.FINAL DX SPEC: NORMAL
PATH REPORT.FINAL DX SPEC: NORMAL
PATH REPORT.GROSS SPEC: NORMAL
PATH REPORT.GROSS SPEC: NORMAL

## 2018-12-17 NOTE — TELEPHONE ENCOUNTER
Dr Lugo wanted us to call and check on patient he had done vaginal biopsy on her. spoke with Hawa maldonado home health nurse pt is on ATB but the biopsy areas are ok and no concerns. Pt has no temperature. Nurse notified to monitor area and call if any concerns of infection noted she understood. Spoke with Dr Lugo pt does not have to come in for appointment its best to have home health monitor and oncology will call us if any concerns due to transportation issues.  I tried calling son to update on status and appointment but no answer left voicemail to call us back.

## 2018-12-17 NOTE — OUTREACH NOTE
Medical Week 1 Survey      Responses   Facility patient discharged from?  [place]   Does the patient have one of the following disease processes/diagnoses(primary or secondary)?  Other   Is there a successful TCM telephone encounter documented?  No   Week 1 attempt successful?  Yes   Call start time  1448   Call end time  1501   Discharge diagnosis   Back pain with radiculopathy, metastatic lesion of spine, lung lesion, vaginal lesion, brain lesion   Is patient permission given to speak with other caregiver?  Yes   Person spoke with today (if not patient) and relationship  Son   Meds reviewed with patient/caregiver?  Yes   Is the patient having any side effects they believe may be caused by any medication additions or changes?  No   Does the patient have all medications ordered at discharge?  Yes   Is the patient taking all medications as directed (includes completed medication regime)?  Yes   Does the patient have a primary care provider?   Yes   Does the patient have an appointment with their PCP within 7 days of discharge?  Yes   Has the patient kept scheduled appointments due by today?  Yes   What is the Home health agency?   [facility]   Has home health visited the patient within 72 hours of discharge?  Yes   Home health comments   consult to assist with transportation to hospitals.   What DME was ordered?  [facility] - bed and BSC, WC   Has all DME been delivered?  Yes   Did the patient receive a copy of their discharge instructions?  Yes   Nursing interventions  Reviewed instructions with patient   What is the patient's perception of their health status since discharge?  Improving   Is the patient/caregiver able to teach back signs and symptoms related to disease process for when to call PCP?  Yes   Is the patient/caregiver able to teach back signs and symptoms related to disease process for when to call 911?  Yes   Is the patient/caregiver able to teach back the hierarchy of who to call/visit for symptoms/problems?  PCP, Specialist, Home health nurse, Urgent Care, ED, 911  Yes   Week 1 call completed?  Yes          Randy Green RN

## 2018-12-17 NOTE — TELEPHONE ENCOUNTER
I received a call from [employer] (#[phone]) but couldn't understand the person's name and all of her questions about this patient.  I did hear something about a bandage being removed and the incision oozing & then something about medication that she doesn't have but couldn't understand what the name of the medication was.    I have called [facility] and left a message with Alyce - she is going to research this and have them call me back.    mandy perez CMA

## 2018-12-18 ENCOUNTER — TELEPHONE (OUTPATIENT)
Dept: NEUROSURGERY | Facility: CLINIC | Age: 68
End: 2018-12-18

## 2018-12-18 ENCOUNTER — TELEPHONE (OUTPATIENT)
Dept: OBSTETRICS AND GYNECOLOGY | Facility: CLINIC | Age: 68
End: 2018-12-18

## 2018-12-18 NOTE — TELEPHONE ENCOUNTER
Jagruti Dang called and gave permission for us to speak with Lele dang and Suzanna dang for pt care.   I called Lele and let him know about yesterday about speaking with home health and how they just need to monitor biopsy areas per dr lugo and call if any concerns. I also let him know Dr Lugo said pt does not have to come in for an appointment, she is more than welcome to but we understand she is terminally ill and transportation is an issue. Lele was upset because his mother is in pain and he feels like no one is caring for her. I let him know he needs to address these issues with her cancer doctor and also speak with home health. He understood. They will call us with any concerns or if need to be seen   Lele dang [phone]

## 2018-12-19 RX ORDER — OXYCODONE AND ACETAMINOPHEN 10; 325 MG/1; MG/1
2 TABLET ORAL EVERY 6 HOURS PRN
Qty: 240 TABLET | Refills: 0 | Status: SHIPPED | OUTPATIENT
Start: 2018-12-19

## 2018-12-19 NOTE — TELEPHONE ENCOUNTER
Patient's son is requesting that we do a PA# for his mom's percocet script.  They were given only 7 days last Thursday when she was discharged but is going to be out this Thursday and needs more.  I have filled out the paperwork that her son, Lele, faxed me and have faxed to [facility] requested authorization.    mandy perez CMA      Copy of the form is in the chart/aj

## 2018-12-19 NOTE — TELEPHONE ENCOUNTER
Approval has been obtained from the patient's medicaid for this prescription.  The previous script had been voided b/c they were only given 7 days worth due to no authorization at that time.      mandy perez CMA

## 2018-12-21 NOTE — TELEPHONE ENCOUNTER
I have never received a call back regarding this patient.  I have called the # that had called me (no name) and left a voicemail asking for a call back.  Then I have called the main home health # and talked with Alyce Quiñonez, and Alessandro.    In the meantime, Erica called me back and said everything was taken care of and the patient is doing fine.    mandy perez CMA

## 2018-12-28 ENCOUNTER — READMISSION MANAGEMENT (OUTPATIENT)
Dept: CALL CENTER | Facility: HOSPITAL | Age: 68
End: 2018-12-28

## 2018-12-28 PROBLEM — F17.200 CURRENT EVERY DAY SMOKER: Status: ACTIVE | Noted: 2018-12-28

## 2018-12-28 PROBLEM — Z71.9 ENCOUNTER FOR CONSULTATION: Status: ACTIVE | Noted: 2018-12-28

## 2018-12-28 NOTE — OUTREACH NOTE
Medical Week 2 Survey      Responses   Facility patient discharged from?  [place]   Does the patient have one of the following disease processes/diagnoses(primary or secondary)?  Other   Week 2 attempt successful?  Yes   Call start time  1003   Discharge diagnosis   Back pain with radiculopathy, metastatic lesion of spine, lung lesion, vaginal lesion, brain lesion   Call end time  1006   Is patient permission given to speak with other caregiver?  Yes   Person spoke with today (if not patient) and relationship  Daughter   Meds reviewed with patient/caregiver?  Yes   Is the patient having any side effects they believe may be caused by any medication additions or changes?  No   Does the patient have all medications ordered at discharge?  Yes   Is the patient taking all medications as directed (includes completed medication regime)?  Yes   Does the patient have a primary care provider?   Yes   Does the patient have an appointment with their PCP within 7 days of discharge?  Yes   Has the patient kept scheduled appointments due by today?  Yes   What is the Home health agency?   [facility]   Has home health visited the patient within 72 hours of discharge?  Yes   Did the patient receive a copy of their discharge instructions?  Yes   Nursing interventions  Reviewed instructions with patient   What is the patient's perception of their health status since discharge?  Improving   Is the patient/caregiver able to teach back signs and symptoms related to disease process for when to call PCP?  Yes   Is the patient/caregiver able to teach back signs and symptoms related to disease process for when to call 911?  Yes   Is the patient/caregiver able to teach back the hierarchy of who to call/visit for symptoms/problems? PCP, Specialist, Home health nurse, Urgent Care, ED, 911  Yes   Additional teach back comments  DTr hasnt seen her but has called and said she is doing better.   Week 2 Call Completed?  Yes          Michelle Smith RN

## 2018-12-31 ENCOUNTER — DOCUMENTATION (OUTPATIENT)
Dept: ONCOLOGY | Facility: HOSPITAL | Age: 68
End: 2018-12-31

## 2018-12-31 ENCOUNTER — HOSPITAL ENCOUNTER (OUTPATIENT)
Dept: RADIATION ONCOLOGY | Facility: HOSPITAL | Age: 68
Setting detail: RADIATION/ONCOLOGY SERIES
End: 2018-12-31

## 2018-12-31 ENCOUNTER — CONSULT (OUTPATIENT)
Dept: RADIATION ONCOLOGY | Facility: HOSPITAL | Age: 68
End: 2018-12-31

## 2018-12-31 ENCOUNTER — HOSPITAL ENCOUNTER (OUTPATIENT)
Dept: RADIATION ONCOLOGY | Facility: HOSPITAL | Age: 68
Discharge: HOME | End: 2018-12-31

## 2018-12-31 VITALS — SYSTOLIC BLOOD PRESSURE: 140 MMHG | HEIGHT: 60 IN | DIASTOLIC BLOOD PRESSURE: 78 MMHG | BODY MASS INDEX: 26.82 KG/M2

## 2018-12-31 DIAGNOSIS — Z71.89 COUNSELING REGARDING ADVANCED CARE PLANNING AND GOALS OF CARE: ICD-10-CM

## 2018-12-31 DIAGNOSIS — C79.9 MULTIPLE LESIONS OF METASTATIC MALIGNANCY (HCC): ICD-10-CM

## 2018-12-31 DIAGNOSIS — C51.9: ICD-10-CM

## 2018-12-31 DIAGNOSIS — F17.200 CURRENT EVERY DAY SMOKER: ICD-10-CM

## 2018-12-31 DIAGNOSIS — C77.9 REGIONAL LYMPH NODE METASTASIS PRESENT (HCC): ICD-10-CM

## 2018-12-31 DIAGNOSIS — C34.32 MALIGNANT NEOPLASM OF LOWER LOBE OF LEFT LUNG (HCC): Primary | ICD-10-CM

## 2018-12-31 DIAGNOSIS — C7A.8 NEUROENDOCRINE CARCINOMA METASTATIC TO BONE (HCC): ICD-10-CM

## 2018-12-31 DIAGNOSIS — Z71.9 ENCOUNTER FOR CONSULTATION: ICD-10-CM

## 2018-12-31 DIAGNOSIS — G89.3 CANCER RELATED PAIN: ICD-10-CM

## 2018-12-31 DIAGNOSIS — C7B.8 NEUROENDOCRINE CARCINOMA METASTATIC TO BONE (HCC): ICD-10-CM

## 2018-12-31 PROCEDURE — 77290 THER RAD SIMULAJ FIELD CPLX: CPT | Performed by: RADIOLOGY

## 2018-12-31 RX ORDER — ALPRAZOLAM 0.5 MG/1
0.5 TABLET ORAL 2 TIMES DAILY PRN
COMMUNITY

## 2019-01-02 ENCOUNTER — HOSPITAL ENCOUNTER (OUTPATIENT)
Dept: RADIATION ONCOLOGY | Facility: HOSPITAL | Age: 69
Setting detail: RADIATION/ONCOLOGY SERIES
End: 2019-01-02

## 2019-01-02 PROCEDURE — 77295 3-D RADIOTHERAPY PLAN: CPT | Performed by: RADIOLOGY

## 2019-01-02 PROCEDURE — 77300 RADIATION THERAPY DOSE PLAN: CPT | Performed by: RADIOLOGY

## 2019-01-02 PROCEDURE — 77334 RADIATION TREATMENT AID(S): CPT | Performed by: RADIOLOGY

## 2019-01-03 PROBLEM — C7A.8 NEUROENDOCRINE CARCINOMA METASTATIC TO BONE (HCC): Status: ACTIVE | Noted: 2018-12-08

## 2019-01-03 PROBLEM — C79.9 MULTIPLE LESIONS OF METASTATIC MALIGNANCY (HCC): Status: ACTIVE | Noted: 2019-01-03

## 2019-01-03 PROBLEM — G89.3 CANCER RELATED PAIN: Status: ACTIVE | Noted: 2019-01-03

## 2019-01-03 PROBLEM — C51.9: Status: ACTIVE | Noted: 2019-01-03

## 2019-01-03 PROBLEM — C7A.8 NEUROENDOCRINE CARCINOMA METASTATIC TO BONE (HCC): Status: RESOLVED | Noted: 2018-12-08 | Resolved: 2019-01-03

## 2019-01-03 PROBLEM — C80.1 MALIGNANT NEOPLASM METASTATIC TO LUMBAR SPINE WITH UNKNOWN PRIMARY SITE (HCC): Status: RESOLVED | Noted: 2018-12-08 | Resolved: 2019-01-03

## 2019-01-03 PROBLEM — C7B.8 NEUROENDOCRINE CARCINOMA METASTATIC TO BONE (HCC): Status: ACTIVE | Noted: 2018-12-08

## 2019-01-03 PROBLEM — C79.51 MALIGNANT NEOPLASM METASTATIC TO LUMBAR SPINE WITH UNKNOWN PRIMARY SITE (HCC): Status: RESOLVED | Noted: 2018-12-08 | Resolved: 2019-01-03

## 2019-01-03 PROBLEM — C77.9 REGIONAL LYMPH NODE METASTASIS PRESENT (HCC): Status: ACTIVE | Noted: 2019-01-03

## 2019-01-03 PROBLEM — R91.8 MASS OF LEFT LUNG: Status: ACTIVE | Noted: 2019-01-03

## 2019-01-03 PROBLEM — Z71.89 COUNSELING REGARDING ADVANCED CARE PLANNING AND GOALS OF CARE: Status: ACTIVE | Noted: 2019-01-03

## 2019-01-03 PROBLEM — C7B.8 NEUROENDOCRINE CARCINOMA METASTATIC TO BONE (HCC): Status: RESOLVED | Noted: 2018-12-08 | Resolved: 2019-01-03

## 2019-01-04 ENCOUNTER — READMISSION MANAGEMENT (OUTPATIENT)
Dept: CALL CENTER | Facility: HOSPITAL | Age: 69
End: 2019-01-04

## 2019-01-05 NOTE — OUTREACH NOTE
Medical Week 3 Survey      Responses   Facility patient discharged from?  [place]   Does the patient have one of the following disease processes/diagnoses(primary or secondary)?  Other   Week 3 attempt successful?  No   Unsuccessful attempts  Attempt 1          Stephanie Garza RN

## 2019-01-07 ENCOUNTER — READMISSION MANAGEMENT (OUTPATIENT)
Dept: CALL CENTER | Facility: HOSPITAL | Age: 69
End: 2019-01-07

## 2019-01-07 NOTE — OUTREACH NOTE
Medical Week 3 Survey      Responses   Facility patient discharged from?  [place]   Does the patient have one of the following disease processes/diagnoses(primary or secondary)?  Other   Week 3 attempt successful?  Yes   Call start time  1627   Call end time  1629   Discharge diagnosis   Back pain with radiculopathy, metastatic lesion of spine, lung lesion, vaginal lesion, brain lesion   Is patient permission given to speak with other caregiver?  Yes   Person spoke with today (if not patient) and relationship  Daughter   Meds reviewed with patient/caregiver?  Yes   Is the patient having any side effects they believe may be caused by any medication additions or changes?  No   Does the patient have all medications ordered at discharge?  Yes   Is the patient taking all medications as directed (includes completed medication regime)?  Yes   Medication comments  added Fentanyl patch   Does the patient have a primary care provider?   Yes   Does the patient have an appointment with their PCP within 7 days of discharge?  Yes   Has the patient kept scheduled appointments due by today?  Yes   What is the Home health agency?   [facility]   Has home health visited the patient within 72 hours of discharge?  Yes   What DME was ordered?  [facility] - bed and BSC, WC   Week 3 Call Completed?  Yes   Revoked  No further contact(revokes)-requires comment          Michelle Chatman RN

## 2019-01-09 ENCOUNTER — HOSPITAL ENCOUNTER (OUTPATIENT)
Dept: RADIATION ONCOLOGY | Facility: HOSPITAL | Age: 69
Discharge: HOME | End: 2019-01-09

## 2019-01-09 PROCEDURE — 77417 THER RADIOLOGY PORT IMAGE(S): CPT | Performed by: RADIOLOGY

## 2019-01-09 PROCEDURE — 77332 RADIATION TREATMENT AID(S): CPT | Performed by: RADIOLOGY

## 2019-01-09 PROCEDURE — 77412 RADIATION TX DELIVERY LVL 3: CPT | Performed by: RADIOLOGY

## 2019-01-10 ENCOUNTER — HOSPITAL ENCOUNTER (OUTPATIENT)
Dept: RADIATION ONCOLOGY | Facility: HOSPITAL | Age: 69
Discharge: HOME | End: 2019-01-10

## 2019-01-10 PROCEDURE — 77412 RADIATION TX DELIVERY LVL 3: CPT | Performed by: RADIOLOGY

## 2019-01-11 ENCOUNTER — HOSPITAL ENCOUNTER (OUTPATIENT)
Dept: RADIATION ONCOLOGY | Facility: HOSPITAL | Age: 69
Discharge: HOME | End: 2019-01-11

## 2019-01-11 PROCEDURE — 77412 RADIATION TX DELIVERY LVL 3: CPT | Performed by: RADIOLOGY

## 2019-01-14 ENCOUNTER — HOSPITAL ENCOUNTER (OUTPATIENT)
Dept: RADIATION ONCOLOGY | Facility: HOSPITAL | Age: 69
Discharge: HOME | End: 2019-01-14

## 2019-01-14 PROCEDURE — 77412 RADIATION TX DELIVERY LVL 3: CPT | Performed by: RADIOLOGY

## 2019-01-15 ENCOUNTER — HOSPITAL ENCOUNTER (OUTPATIENT)
Dept: RADIATION ONCOLOGY | Facility: HOSPITAL | Age: 69
Discharge: HOME | End: 2019-01-15

## 2019-01-15 PROCEDURE — 77412 RADIATION TX DELIVERY LVL 3: CPT | Performed by: RADIOLOGY

## 2019-01-16 ENCOUNTER — APPOINTMENT (OUTPATIENT)
Dept: GENERAL RADIOLOGY | Facility: HOSPITAL | Age: 69
End: 2019-01-16

## 2019-01-16 ENCOUNTER — HOSPITAL ENCOUNTER (INPATIENT)
Facility: HOSPITAL | Age: 69
LOS: 4 days | Discharge: HOME | End: 2019-01-20
Attending: FAMILY MEDICINE | Admitting: FAMILY MEDICINE

## 2019-01-16 ENCOUNTER — APPOINTMENT (OUTPATIENT)
Dept: CT IMAGING | Facility: HOSPITAL | Age: 69
End: 2019-01-16

## 2019-01-16 DIAGNOSIS — Z78.9 DECREASED ACTIVITIES OF DAILY LIVING (ADL): ICD-10-CM
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
## 2019-01-16 DIAGNOSIS — J18.9 HOSPITAL-ACQUIRED PNEUMONIA: Primary | ICD-10-CM

## 2019-01-16 DIAGNOSIS — Z74.09 IMPAIRED MOBILITY: ICD-10-CM

## 2019-01-16 LAB
ALBUMIN SERPL-MCNC: 3 G/DL (ref 3.5–5)
ALBUMIN/GLOB SERPL: 0.7 G/DL (ref 1.1–2.5)
ALP SERPL-CCNC: 139 U/L (ref 24–120)
ALT SERPL W P-5'-P-CCNC: 23 U/L (ref 0–54)
ANION GAP SERPL CALCULATED.3IONS-SCNC: 9 MMOL/L (ref 4–13)
APTT PPP: 46.2 SECONDS (ref 24.1–34.8)
AST SERPL-CCNC: 52 U/L (ref 7–45)
BASOPHILS # BLD AUTO: 0.08 10*3/MM3 (ref 0–0.2)
BASOPHILS NFR BLD AUTO: 0.4 % (ref 0–2)
BILIRUB SERPL-MCNC: 0.5 MG/DL (ref 0.1–1)
BUN BLD-MCNC: 17 MG/DL (ref 5–21)
BUN/CREAT SERPL: 58.6 (ref 7–25)
CALCIUM SPEC-SCNC: 8 MG/DL (ref 8.4–10.4)
CHLORIDE SERPL-SCNC: 89 MMOL/L (ref 98–110)
CO2 SERPL-SCNC: 31 MMOL/L (ref 24–31)
CREAT BLD-MCNC: 0.29 MG/DL (ref 0.5–1.4)
D-LACTATE SERPL-SCNC: 0.7 MMOL/L (ref 0.5–2)
DEPRECATED RDW RBC AUTO: 44.9 FL (ref 40–54)
EOSINOPHIL # BLD AUTO: 0 10*3/MM3 (ref 0–0.7)
EOSINOPHIL NFR BLD AUTO: 0 % (ref 0–4)
ERYTHROCYTE [DISTWIDTH] IN BLOOD BY AUTOMATED COUNT: 14.8 % (ref 12–15)
GFR SERPL CREATININE-BSD FRML MDRD: >150 ML/MIN/1.73
GLOBULIN UR ELPH-MCNC: 4.2 GM/DL
GLUCOSE BLD-MCNC: 98 MG/DL (ref 70–100)
HCT VFR BLD AUTO: 27.5 % (ref 37–47)
HGB BLD-MCNC: 9.3 G/DL (ref 12–16)
HOLD SPECIMEN: NORMAL
HOLD SPECIMEN: NORMAL
IMM GRANULOCYTES # BLD AUTO: 0.36 10*3/MM3 (ref 0–0.03)
IMM GRANULOCYTES NFR BLD AUTO: 1.9 % (ref 0–5)
INR PPP: 1.14 (ref 0.91–1.09)
LYMPHOCYTES # BLD AUTO: 1.15 10*3/MM3 (ref 0.72–4.86)
LYMPHOCYTES NFR BLD AUTO: 6.1 % (ref 15–45)
MAGNESIUM SERPL-MCNC: 1.7 MG/DL (ref 1.4–2.2)
MCH RBC QN AUTO: 28.1 PG (ref 28–32)
MCHC RBC AUTO-ENTMCNC: 33.8 G/DL (ref 33–36)
MCV RBC AUTO: 83.1 FL (ref 82–98)
MONOCYTES # BLD AUTO: 1.28 10*3/MM3 (ref 0.19–1.3)
MONOCYTES NFR BLD AUTO: 6.8 % (ref 4–12)
NEUTROPHILS # BLD AUTO: 15.86 10*3/MM3 (ref 1.87–8.4)
NEUTROPHILS NFR BLD AUTO: 84.8 % (ref 39–78)
NRBC BLD AUTO-RTO: 0 /100 WBC (ref 0–0)
NT-PROBNP SERPL-MCNC: 1230 PG/ML (ref 0–900)
PHOSPHATE SERPL-MCNC: 4.4 MG/DL (ref 2.5–4.5)
PLATELET # BLD AUTO: 490 10*3/MM3 (ref 130–400)
PMV BLD AUTO: 9.5 FL (ref 6–12)
POTASSIUM BLD-SCNC: 3.9 MMOL/L (ref 3.5–5.3)
PROT SERPL-MCNC: 7.2 G/DL (ref 6.3–8.7)
PROTHROMBIN TIME: 15 SECONDS (ref 11.9–14.6)
RBC # BLD AUTO: 3.31 10*6/MM3 (ref 4.2–5.4)
SODIUM BLD-SCNC: 129 MMOL/L (ref 135–145)
TROPONIN I SERPL-MCNC: <0.012 NG/ML (ref 0–0.03)
TROPONIN I SERPL-MCNC: <0.012 NG/ML (ref 0–0.03)
WBC NRBC COR # BLD: 18.73 10*3/MM3 (ref 4.8–10.8)
WHOLE BLOOD HOLD SPECIMEN: NORMAL
WHOLE BLOOD HOLD SPECIMEN: NORMAL

## 2019-01-16 PROCEDURE — 93005 ELECTROCARDIOGRAM TRACING: CPT

## 2019-01-16 PROCEDURE — 94760 N-INVAS EAR/PLS OXIMETRY 1: CPT

## 2019-01-16 PROCEDURE — 25010000002 CEFEPIME PER 500 MG: Performed by: FAMILY MEDICINE

## 2019-01-16 PROCEDURE — 87150 DNA/RNA AMPLIFIED PROBE: CPT | Performed by: PHYSICIAN ASSISTANT

## 2019-01-16 PROCEDURE — 87186 SC STD MICRODIL/AGAR DIL: CPT | Performed by: PHYSICIAN ASSISTANT

## 2019-01-16 PROCEDURE — 87185 SC STD ENZYME DETCJ PER NZM: CPT | Performed by: PHYSICIAN ASSISTANT

## 2019-01-16 PROCEDURE — 99285 EMERGENCY DEPT VISIT HI MDM: CPT

## 2019-01-16 PROCEDURE — 80053 COMPREHEN METABOLIC PANEL: CPT

## 2019-01-16 PROCEDURE — 87147 CULTURE TYPE IMMUNOLOGIC: CPT | Performed by: PHYSICIAN ASSISTANT

## 2019-01-16 PROCEDURE — 85730 THROMBOPLASTIN TIME PARTIAL: CPT | Performed by: PHYSICIAN ASSISTANT

## 2019-01-16 PROCEDURE — 87205 SMEAR GRAM STAIN: CPT | Performed by: FAMILY MEDICINE

## 2019-01-16 PROCEDURE — 25010000002 VANCOMYCIN 1 G RECONSTITUTED SOLUTION: Performed by: PHYSICIAN ASSISTANT

## 2019-01-16 PROCEDURE — 93005 ELECTROCARDIOGRAM TRACING: CPT | Performed by: FAMILY MEDICINE

## 2019-01-16 PROCEDURE — 83735 ASSAY OF MAGNESIUM: CPT | Performed by: PHYSICIAN ASSISTANT

## 2019-01-16 PROCEDURE — 84484 ASSAY OF TROPONIN QUANT: CPT | Performed by: FAMILY MEDICINE

## 2019-01-16 PROCEDURE — 83880 ASSAY OF NATRIURETIC PEPTIDE: CPT | Performed by: PHYSICIAN ASSISTANT

## 2019-01-16 PROCEDURE — 0 IOPAMIDOL PER 1 ML: Performed by: PHYSICIAN ASSISTANT

## 2019-01-16 PROCEDURE — 36415 COLL VENOUS BLD VENIPUNCTURE: CPT | Performed by: FAMILY MEDICINE

## 2019-01-16 PROCEDURE — 85025 COMPLETE CBC W/AUTO DIFF WBC: CPT

## 2019-01-16 PROCEDURE — 71275 CT ANGIOGRAPHY CHEST: CPT

## 2019-01-16 PROCEDURE — 87581 M.PNEUMON DNA AMP PROBE: CPT | Performed by: FAMILY MEDICINE

## 2019-01-16 PROCEDURE — 87186 SC STD MICRODIL/AGAR DIL: CPT | Performed by: FAMILY MEDICINE

## 2019-01-16 PROCEDURE — 87185 SC STD ENZYME DETCJ PER NZM: CPT | Performed by: FAMILY MEDICINE

## 2019-01-16 PROCEDURE — 83605 ASSAY OF LACTIC ACID: CPT | Performed by: FAMILY MEDICINE

## 2019-01-16 PROCEDURE — 25010000002 DIPHENHYDRAMINE PER 50 MG: Performed by: FAMILY MEDICINE

## 2019-01-16 PROCEDURE — 25010000002 METHYLPREDNISOLONE PER 125 MG: Performed by: FAMILY MEDICINE

## 2019-01-16 PROCEDURE — 77336 RADIATION PHYSICS CONSULT: CPT | Performed by: RADIOLOGY

## 2019-01-16 PROCEDURE — 87147 CULTURE TYPE IMMUNOLOGIC: CPT | Performed by: FAMILY MEDICINE

## 2019-01-16 PROCEDURE — 84484 ASSAY OF TROPONIN QUANT: CPT

## 2019-01-16 PROCEDURE — 85610 PROTHROMBIN TIME: CPT | Performed by: PHYSICIAN ASSISTANT

## 2019-01-16 PROCEDURE — 94799 UNLISTED PULMONARY SVC/PX: CPT

## 2019-01-16 PROCEDURE — 87077 CULTURE AEROBIC IDENTIFY: CPT | Performed by: FAMILY MEDICINE

## 2019-01-16 PROCEDURE — 84100 ASSAY OF PHOSPHORUS: CPT | Performed by: PHYSICIAN ASSISTANT

## 2019-01-16 PROCEDURE — 93010 ELECTROCARDIOGRAM REPORT: CPT | Performed by: INTERNAL MEDICINE

## 2019-01-16 PROCEDURE — 87077 CULTURE AEROBIC IDENTIFY: CPT | Performed by: PHYSICIAN ASSISTANT

## 2019-01-16 PROCEDURE — 25010000002 PIPERACILLIN SOD-TAZOBACTAM PER 1 G: Performed by: PHYSICIAN ASSISTANT

## 2019-01-16 PROCEDURE — 87070 CULTURE OTHR SPECIMN AEROBIC: CPT | Performed by: FAMILY MEDICINE

## 2019-01-16 PROCEDURE — 87184 SC STD DISK METHOD PER PLATE: CPT | Performed by: FAMILY MEDICINE

## 2019-01-16 PROCEDURE — 71045 X-RAY EXAM CHEST 1 VIEW: CPT

## 2019-01-16 PROCEDURE — 87040 BLOOD CULTURE FOR BACTERIA: CPT | Performed by: PHYSICIAN ASSISTANT

## 2019-01-16 RX ORDER — IPRATROPIUM BROMIDE AND ALBUTEROL SULFATE 2.5; .5 MG/3ML; MG/3ML
3 SOLUTION RESPIRATORY (INHALATION) EVERY 4 HOURS PRN
Status: DISCONTINUED | OUTPATIENT
Start: 2019-01-16 | End: 2019-01-20 | Stop reason: HOSPADM

## 2019-01-16 RX ORDER — SODIUM CHLORIDE 0.9 % (FLUSH) 0.9 %
10 SYRINGE (ML) INJECTION AS NEEDED
Status: DISCONTINUED | OUTPATIENT
Start: 2019-01-16 | End: 2019-01-20 | Stop reason: HOSPADM

## 2019-01-16 RX ORDER — GUAIFENESIN 600 MG/1
1200 TABLET, EXTENDED RELEASE ORAL EVERY 12 HOURS SCHEDULED
Status: DISCONTINUED | OUTPATIENT
Start: 2019-01-16 | End: 2019-01-20 | Stop reason: HOSPADM

## 2019-01-16 RX ORDER — ACETAMINOPHEN 325 MG/1
650 TABLET ORAL EVERY 6 HOURS PRN
Status: DISCONTINUED | OUTPATIENT
Start: 2019-01-16 | End: 2019-01-20 | Stop reason: HOSPADM

## 2019-01-16 RX ORDER — PANTOPRAZOLE SODIUM 20 MG/1
20 TABLET, DELAYED RELEASE ORAL DAILY
Status: DISCONTINUED | OUTPATIENT
Start: 2019-01-16 | End: 2019-01-20 | Stop reason: HOSPADM

## 2019-01-16 RX ORDER — FENTANYL 25 UG/H
1 PATCH TRANSDERMAL
Status: ON HOLD | COMMUNITY
End: 2019-01-20 | Stop reason: SDUPTHER

## 2019-01-16 RX ORDER — ASPIRIN 81 MG/1
324 TABLET, CHEWABLE ORAL ONCE
Status: DISCONTINUED | OUTPATIENT
Start: 2019-01-16 | End: 2019-01-16

## 2019-01-16 RX ORDER — DIPHENHYDRAMINE HYDROCHLORIDE 50 MG/ML
50 INJECTION INTRAMUSCULAR; INTRAVENOUS ONCE
Status: COMPLETED | OUTPATIENT
Start: 2019-01-16 | End: 2019-01-16

## 2019-01-16 RX ORDER — METHYLPREDNISOLONE SODIUM SUCCINATE 125 MG/2ML
125 INJECTION, POWDER, LYOPHILIZED, FOR SOLUTION INTRAMUSCULAR; INTRAVENOUS ONCE
Status: COMPLETED | OUTPATIENT
Start: 2019-01-16 | End: 2019-01-16

## 2019-01-16 RX ORDER — OXYCODONE AND ACETAMINOPHEN 10; 325 MG/1; MG/1
2 TABLET ORAL EVERY 6 HOURS PRN
Status: DISCONTINUED | OUTPATIENT
Start: 2019-01-16 | End: 2019-01-20 | Stop reason: HOSPADM

## 2019-01-16 RX ORDER — ONDANSETRON 2 MG/ML
4 INJECTION INTRAMUSCULAR; INTRAVENOUS EVERY 6 HOURS PRN
Status: DISCONTINUED | OUTPATIENT
Start: 2019-01-16 | End: 2019-01-20 | Stop reason: HOSPADM

## 2019-01-16 RX ORDER — ALPRAZOLAM 0.5 MG/1
0.5 TABLET ORAL 2 TIMES DAILY PRN
Status: DISCONTINUED | OUTPATIENT
Start: 2019-01-16 | End: 2019-01-20 | Stop reason: HOSPADM

## 2019-01-16 RX ORDER — ASPIRIN 81 MG/1
81 TABLET, CHEWABLE ORAL DAILY
Status: DISCONTINUED | OUTPATIENT
Start: 2019-01-16 | End: 2019-01-20 | Stop reason: HOSPADM

## 2019-01-16 RX ORDER — FENTANYL 25 UG/H
1 PATCH TRANSDERMAL
Status: DISCONTINUED | OUTPATIENT
Start: 2019-01-16 | End: 2019-01-19

## 2019-01-16 RX ADMIN — OXYCODONE HYDROCHLORIDE AND ACETAMINOPHEN 2 TABLET: 10; 325 TABLET ORAL at 16:04

## 2019-01-16 RX ADMIN — METHYLPREDNISOLONE SODIUM SUCCINATE 125 MG: 125 INJECTION, POWDER, FOR SOLUTION INTRAMUSCULAR; INTRAVENOUS at 19:56

## 2019-01-16 RX ADMIN — FENTANYL 1 PATCH: 25 PATCH, EXTENDED RELEASE TRANSDERMAL at 16:07

## 2019-01-16 RX ADMIN — IOPAMIDOL 100 ML: 755 INJECTION, SOLUTION INTRAVENOUS at 12:25

## 2019-01-16 RX ADMIN — OXYCODONE HYDROCHLORIDE AND ACETAMINOPHEN 2 TABLET: 10; 325 TABLET ORAL at 22:32

## 2019-01-16 RX ADMIN — VANCOMYCIN HYDROCHLORIDE 1000 MG: 1 INJECTION, POWDER, LYOPHILIZED, FOR SOLUTION INTRAVENOUS at 16:08

## 2019-01-16 RX ADMIN — PIPERACILLIN SODIUM,TAZOBACTAM SODIUM 3.38 G: 3; .375 INJECTION, POWDER, FOR SOLUTION INTRAVENOUS at 14:01

## 2019-01-16 RX ADMIN — SODIUM CHLORIDE 1000 ML: 9 INJECTION, SOLUTION INTRAVENOUS at 12:55

## 2019-01-16 RX ADMIN — DIPHENHYDRAMINE HYDROCHLORIDE 50 MG: 50 INJECTION, SOLUTION INTRAMUSCULAR; INTRAVENOUS at 19:56

## 2019-01-16 RX ADMIN — PANTOPRAZOLE SODIUM 20 MG: 20 TABLET, DELAYED RELEASE ORAL at 16:08

## 2019-01-16 RX ADMIN — GUAIFENESIN 1200 MG: 600 TABLET, EXTENDED RELEASE ORAL at 19:56

## 2019-01-16 RX ADMIN — CEFEPIME HYDROCHLORIDE 1 G: 1 INJECTION, POWDER, FOR SOLUTION INTRAMUSCULAR; INTRAVENOUS at 18:17

## 2019-01-16 NOTE — PROGRESS NOTES
"Pharmacy Dosing Service  Pharmacokinetics  Vancomycin Initial Evaluation    Assessment/Action/Plan:  Initiated Vancomycin 750 mg IVPB every 12 hours, following 1g IV loading dose. Vancomycin levels not ordered at this time. Pharmacy will monitor renal function and adjust dose accordingly.     Subjective:  Kasey Rios is a 68 y.o. female with a Vancomycin \"Pharmacy to Dose\" consult for the treatment of PNA x9 days .    Objective:  Ht: 152.4 cm (60\"); Wt: 50.3 kg (111 lb)  Estimated Creatinine Clearance: 53.4 mL/min (A) (by C-G formula based on SCr of 0.29 mg/dL (L)).   Lab Results   Component Value Date    CREATININE 0.29 (L) 01/16/2019    CREATININE 0.59 12/11/2018    CREATININE 0.63 12/08/2018      Lab Results   Component Value Date    WBC 18.73 (H) 01/16/2019    WBC 13.36 (H) 12/13/2018    WBC 13.81 (H) 12/11/2018      Baseline culture results:  Microbiology Results (last 10 days)       ** No results found for the last 240 hours. **            Liliana Young, PharmD  01/16/19 3:48 PM    "

## 2019-01-16 NOTE — PLAN OF CARE
Problem: Patient Care Overview  Goal: Plan of Care Review  Outcome: Ongoing (interventions implemented as appropriate)   01/16/19 1641   Coping/Psychosocial   Plan of Care Reviewed With patient   Plan of Care Review   Progress no change   OTHER   Outcome Summary Pt new admit to 4c from ER this shift. VSS. IV abx infusing. 02 stable on room air. C/O generalized pain throughout shift. Medicated with PRN meds with some relief. Family at bedside. Will continue to monitor.      Goal: Individualization and Mutuality  Outcome: Ongoing (interventions implemented as appropriate)    Goal: Discharge Needs Assessment  Outcome: Ongoing (interventions implemented as appropriate)    Goal: Interprofessional Rounds/Family Conf  Outcome: Ongoing (interventions implemented as appropriate)      Problem: Fall Risk (Adult)  Goal: Identify Related Risk Factors and Signs and Symptoms  Outcome: Ongoing (interventions implemented as appropriate)    Goal: Absence of Fall  Outcome: Ongoing (interventions implemented as appropriate)      Problem: Skin Injury Risk (Adult)  Goal: Identify Related Risk Factors and Signs and Symptoms  Outcome: Ongoing (interventions implemented as appropriate)    Goal: Skin Health and Integrity  Outcome: Ongoing (interventions implemented as appropriate)      Problem: Pneumonia (Adult)  Goal: Signs and Symptoms of Listed Potential Problems Will be Absent, Minimized or Managed (Pneumonia)  Outcome: Ongoing (interventions implemented as appropriate)      Problem: Pain, Chronic (Adult)  Goal: Identify Related Risk Factors and Signs and Symptoms  Outcome: Outcome(s) achieved Date Met: 01/16/19    Goal: Acceptable Pain/Comfort Level and Functional Ability  Outcome: Ongoing (interventions implemented as appropriate)

## 2019-01-16 NOTE — CONSULTS
PATIENT NAME: Kasey Rios  DATE: 01/16/2019  YOB: 1950  PATIENT #: [id number]    HOSPITAL CONSULT: [facility]    DATE OF ADMISSION: 01/16/2019  CONSULTATION DATE: 01/16/2019  FACILITY MR #: [id number]  REFERRING PHYSICIAN: Pernell Osorio MD (hospitalist)    NOTES    REASON FOR CONSULTATION: Continuity of care.     HISTORY OF PRESENT ILLNESS: Ms. Kasey Rios is an unfortunate 68-year-old  female with widely metastatic malignancy, likely lung primary. She was last seen 01/03/2019.  she is seen with her sister and daughter in law Clau.  History from [place] and chart.      The patient presented to [facility] Emergency Room 01/16/2019 secondary to left-sided chest pain radiating across to the right chest. It is associated with nonproductive cough. The patient was given nitroglycerin and pain has resolved. She denies fever, nausea, vomiting, diarrhea, or hemoptysis.     CBC 01/16/2019 revealed a WBC of 18.7, hemoglobin 9.3, hematocrit 27.5, MCV 83.1, platelet count 490,000 with ANC of 15.8.     CMP remarkable for a sodium of 129, creatinine 0.29, chloride 89, calcium 8, albumin 3, AST 52, alkaline phosphatase 139. Troponin was negative at less than 0.012. BNP elevated at 1230 pg/mL.    PT 15, INR 1.14, PTT 46.2. Magnesium normal as well as phosphorus. Blood culture pending.     Repeat troponin 1359 hours was less than 0.012. Lactic acid normal at 0.7.     Urine for Legionella antigen and Streptococcus pneumoniae antigen pending.     Mycoplasma pneumonia PCR pending.    Chest x-ray 01/16/2019 showed new hazy opacities left upper lobe questionable developing pneumonia. Extensive left lower lobe tumor.    CT angiogram of the chest 01/16/2019 showed no evidence of pulmonary embolism. Left lower lobe lung mass with surrounding pneumonia. Mediastinal and left hilar adenopathy. New enlarged lymph node in the left axilla measuring 1.3 cm. New destructive changes inferior glenoid  on the left consistent with metastatic disease. Large metastatic masses of adrenal glands have enlarged.     The patient started palliative radiation L3, vulva, lung, 01/09/2019 through 01/15/2019, 5/13 fractions.    With the above background, she is seen.     DIAGNOSTIC ABNORMALITIES:  Labs, 12/08/2018: Urinalysis showed 1+ blood, 3+ leukocyte esterase, 6-12 RBCs, 31-50 WBCs but no bacteria. Urine culture shows no growth at 24 hours. PT is 13.2, INR 0.97, PTT 30.1 (each within reference range). Hemoglobin 12.1, hematocrit 35.7, MCV 85, platelets 444,000, WBC 13.24 with 86.4 segs and 7.8 lymphocytes. BMP is notable for a glucose of 101 but is otherwise normal with a GFR of 94 and calcium of 8.5. Greater than 100,000 colonies of mixed gram positive khushboo (urine culture).  X-ray of the pelvis 12/08/2018. Impression: Normal exam of the pelvis and right hip.  CT of the lumbar spine without contrast, 12/08/2018. Impression: Acute 2 column fracture involving the anterior middle columns of the L3 vertebral body with involvement of the right pedicle. No evidence of retropulsion of the posterior aspect of the vertebral body but evidence of thecal sac compression secondary to suspected epidural hematoma along the ventral aspect of the thecal sac. This measures approximately 1.4 x 1 cm in size. MRI recommended. Large right suprarenal mass most likely related to the adrenal gland but incompletely imaged. Adrenal cortical carcinoma not excluded given the size of the lesion. Hematoma of the adrenal also in the differential. Metastatic disease also considered. Addendum: Imaging of the spine with MRI (see below) concerning for metastatic disease with adrenal lesions and L3 fracture. L3 vertebral body is permeative in appearance suggesting pathologic fracture related to metastatic disease. Ventral mass within the spinal canal is suspicious for neoplastic extension into the spinal canal. Suspect the lesion within the left lower lobe  on the upper most cut of today's exam suspicious for related primary lung carcinoma with metastatic disease to the adrenal glands and the L3 vertebral body.  MRI lumbar spine with and without contrast, 12/08/2018. Impression: Acute 2 column fracture involving the L3 vertebral body involving the anterior and middle column and right pedicle with associated edema and paraspinal soft tissue consistent with either hematoma or neoplasia. There is no retropulsion of the posterior aspect of the vertebral body but there is a moderate sized epidural mass within the ventral spinal canal resulting in effacement of the thecal sac and moderate to severe central spinal stenosis. This is also contributing to neural foraminal narrowing bilaterally at the L3-4 disk level related to a combination of bulging disk and facet overgrowth in the epidural mass. Findings concerning for pathologic fracture related to metastatic disease with associated extension into the ventral epidural space. Central and foraminal stenosis at other levels related to bulging disks as well as facet and ligamentous hypertrophy. Bilateral adrenal masses.   CT abdomen and pelvis with contrast, 12/08/2018. Impression: Dominant mass within the left lower lobe of the lung. Associated hilar and middle mediastinal adenopathy. A less paraspinal cristi mass is also present as well as a satellite lesion and pleural studding. Also noted is an enlarged interaortocaval node and some enlarged periportal nodes. Ill-defined hypodense lesion involving the posterior interpolar aspect of the left kidney suspicious for metastatic disease. No evidence of hepatic lesions. Marked enlargement and heterogeneity is noted of both adrenal glands consistent with large adrenal metastasis. Small cell carcinoma suspected. Borderline enlarged left periaortic node also present. Diverticulosis of the descending and sigmoid colon without evidence of acute diverticulitis. No evidence of mechanical  bowel obstruction. Moderate 2 column fracture at the L3 level with an epidural mass along the posterior margin of the vertebral body. Vertebral body is permeative in appearance suspicious for pathologic fracture related to metastatic disease with epidural extension.   CT chest with contrast, 12/08/2018. Impression: Large mass lesion within the left lower lobe (5.6 x 5.7 cm). This is contiguous with the left inferior hilar structures with associated left perihilar lymphadenopathy. There is also evidence of metastatic disease to the middle mediastinum at the level of the more distal esophagus. The margins of the esophagus are difficult to delineate at this level with suspicious secondary involvement of the esophagus. There is also an enlarged prevascular node as well as paraspinal mass at the level of the left lung base. Bilateral adrenal metastases present. There is periportal and interaortocaval adenopathy within the upper abdomen. Satellite lesions are noted within the left lower lobe with nodular thickening of the major fissure on the left as well as consistent with pleural studding. Coronary calcifications. Some effacement of the left main pulmonary artery along its posterior aspect related to the hilar cristi component of the mass. The adrenal masses measure 5.5 x 6.1 cm on the left and 6.7 x 5.4 cm on the right.   Labs, 12/10/208: Ferritin 106, folate 5.02 (each within reference range), B12 of 214 (depressed), CEA 11.6 (elevated). CEA 11.6.  Bone scan, 12/10/2018: Impression: Only minimal tracer activity identified within the L3 vertebra on the anterior images. The lack of intense uptake at the site of the L3 vertebral fracture indicates no significant osteoblastic activity at this level.  Asymmetrical increased tracer activity of the left shoulder/glenoid may be degenerative. Further assessment with MRI could be performed if presence of metastasis at this site would alter management.  PREVIOUS  INTERVENTIONS:  L3 bilateral laminectomy resection of epidural tumor, vertebral body biopsy, radiofrequency ablation of vertebral body tumor, kyphoplasty, right vulvar biopsy, 12/10/2018. Dr. Benitez and Dr. Lugo, respectively.   Palliative radiation L3, vulva, lung, 2019 through 01/15/2019, / fractions.  PAST MEDICAL HISTORY:  ALLERGIES:   No known medication allergies  HOSPITAL MEDICATIONS:  Aspirin 81 mg daily  Duragesic 25 mcg patch every 72 hours  Maxipime 1 g IV every 12 hours  Percocet 10 mg 2 tablets p.o. every 6 hours as needed  Protonix 20 mg daily  Tylenol 650 mg p.o. every 6 hours as needed  Vancomycin 750 mg every 12 hours  Xanax 0.5 mg p.o. twice daily as needed  Zofran 4 mg IV every 6 hours as needed  ADULT ILLNESSES:  Malignant neuroendocrine tumor of lung ( ICD-10:C7A.090 ;Malignant carcinoid tumor of the bronchus and lung  Chronic obstructive pulmonary disease ( COPD, presumed ; ICD-10:J44.9 ;Chronic obstructive pulmonary disease, unspecified )  Coronary artery disease ( ICD-10:I25.10 ;Atherosclerotic heart disease of native coronary artery without angina pectoris  Hypertension ( ICD-10:I10 ;Essential (primary) hypertension  Normocytic anemia ( ICD-10:D64.9 ;Anemia, unspecified  Squamous cell cancer of vulva ( ICD-10:C51.9 ;Malignant neoplasm of vulva, unspecified  Thrombocytosis ( ICD-10:D47.3 ;Essential (hemorrhagic) thrombocythemia  Tobacco smoker ( ICD-10:F17.210 ;Nicotine dependence, cigarettes, uncomplicated  SURGERIES:  Biopsy, vulvar  Port placement  Laparoscopic bilateral salpingo-oophorectomy  Tubal abdominal ligation  Exploratory lumbar laminectomy  Excision of appendix, (appendectomy)  Hysterectomy  Operative procedure on spinal structure: Kyphoplasty  FAMILY HISTORY:  Maternal grandmother  from metastatic malignancy. Two maternal uncles  from lung cancer.     SOCIAL HISTORY:  Single. She has 3 children (1 daughter and 2 sons). . She has smoked up to 1/2 pack per day  since age 18. She does not use alcohol. 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     REVIEW OF SYSTEMS:  Constitutional:  The patient's appetite and energy are poor. She has no fevers, chills, or drenching night sweats.  Ear/Nose/Throat/Mouth:  She has no sinus symptoms, sore throat, or nosebleeds. She has no headaches. She has no hemoptysis.    Ocular:  She has no eye pain, change in visual acuity, double vision, or blurry vision.  Respiratory:  She has cough and shortness of breathing.  Cardiovascular:  She had chest pain. She has no palpitations.  Gastrointestinal:  She has no nausea, vomiting, abdominal pain, or discoloration of the stool.   Genitourinary:  She has no urinary burning or discoloration.   Musculoskeletal:  She has back pain.  On radiation.  She has no unexplained arthralgias, myalgias, or leg cramping.  Extremities:  She has no trouble with fluid retention or significant leg swelling.  Endocrine:  She has no problems with excess thirst, vasomotor instability, or unexplained fatigue.  Heme/Lymphatic:  She has no unexplained bleeding or bruising.  Skin:  She has no itching or rashes.  Neuro:  She has no loss of consciousness or seizures. She has no weakness of her face, arms, or legs. She has no difficulty with speech. She has no tremors.  Psych:  She ha sno anxiety. She reports no mood swings.     VITAL SIGNS: Temperature 98.3, blood pressure 104/58, heart rate 95, respiratory rate 16, height 60 inches, weight 111 pounds     PHYSICAL EXAMINATION:  General:  She is a chronically ill-looking and modestly-kept female who is seen by the bedside. She is lying comfortably in bed. She appears to be her stated age. Her skin color is pale.  Head/Neck:  The patient is anicteric and atraumatic.  No thrush.  The neck is supple without evidence of jugular venous distention or cervical adenopathy.   Eyes:  The pupils are equal and round. There is no scleral jaundice or erythema.     Chest:  Positive for rhonchi in the left lung field.    Cardiovascular:  The patient has a regular cardiac rate and rhythm without murmurs.  Abdomen:  The belly is soft and flat. There is no tenderness. Bowel sounds are normal.  Extremities:  There is no evidence of cyanosis, clubbing, or edema.  Rheumatologic:  There is no osteoarthritic changes of the hands.  Cutaneous:  There are no overt rashes, purpura, or petechiae.    Lymphatics:  There is no evidence of adenopathy in the cervical or supraclavicular areas.   Neurologic:  The patient is alert, oriented, cooperative, and pleasant.  She is appropriately conversant and spontaneous of speech.  She moves all extremities.  Psych:  Mood and affect are appropriate for circumstance. Eye contact is appropriate.  Normal behavior.     ASSESSMENT:  1.    Hospital acquired pneumonia.   2.    Chest pain workup in progress.      CONCURRENT PROBLEMS:  1.    Neuroendocrine large cell carcinoma.   Tumor stage:  Extensive stage (cT4 cN2 M1).  Tumor burden: 5.6 x 5.7 cm mass within the left lower lobe with 1.3 cm peripheral satellite lesion in the left lower lobe, pleural based paraspinal mass in the medial left lung, 2.2 x 1.5 cm contiguous with infrahilar lymphadenopathy, and 2.5 x 3.9 cm contiguous mass posterior to the left atrium inseparable from the esophagus. 4.9 x 2.9 cm prevascular node. Effacement of the distal left main pulmonary artery along its posterior margin related to the hilar component of the neoplasm. Bilateral adrenal metastases (5.5 x 6.1 on the left and 6.7 x 5.4 cm on the right). Interaortocaval node 1.9 x 2.3 cm. Pleural studding. Ill-defined hypodense lesion involving the posterior interpolar aspect of the left kidney suspicious for metastatic disease. L3 vertebral body epidural mass associated with extension into the ventral epidural space (1.4 x 1.0 cm extradural mass associated with effacement of the thecal sac).   Complications of tumor: Acute 2  column fracture involving L3 vertebral body involving the anterior and middle column and right pedicle with associated edema. Resultant myelopathic symptoms with radicular pain to the right leg and inability to walk.  Tumor status: Untreated.  Prognosis: Poor.  2.    Vulvar/right labial mass/lesion. Primary squamous cell carcinoma.  3.    Normocytic anemia, likely of malignancy (anemia of chronic disease). Hemoglobin 11.7, MCV 86.4. Ferritin 106, B12 of 214 (depressed) on 12/10/2018.   4.    Thrombocytosis, likely reactive.  5.    Long-standing tobacco smoker (1/2 pack per day since age 18).  6.    Coronary artery calcifications (right coronary left anterior descending (LAD) and circumflex distributions).  7.    Poor performance status (ECOG 3).  8.    Poor overall prognosis.    PLAN:  1.     Re: The reason for seeing this examiner.   2.    Antibiotics per primary.  No further oncologic workup.   3.    Very poor overall prognosis.  4.    Suggest hospice after completion of palliative radiation.   5.    Above discussed with the patient, sister Leesa, and daughter-in-law Clau. They verbalized understanding.     Thank you for allowing me to participate in the care of Kasey Rios.    TIME SPENT:  Face to face time on this encounter,as defined by the American Medical Association in the 2019 Current Procedural Terminology codebook; assessment, record review, lab review, planning and education is 46 minutes.      Howard Ramos MD  [facility]     cc:  MD Manas Das MD Peter Locken, MD Thomas Gruber, MD        Electronically signed by Howard Ramos MD 01/16/2019 17:13 CST

## 2019-01-16 NOTE — ED PROVIDER NOTES
Subjective   History of Present Illness  68-year-old female presents with a chief complaint of left-sided chest pain that began last sign.  The patient reports her pain continued to deny radiated across her right side of her chest.  She says she had pain with breathing and she had been coughing.  Called EMS this morning as her pain had progressed become worse.  She received 2 nitroglycerin and her pain had resolved.  She has a history of metastatic lung cancer.  She has been treated with palliative radiation therapy.  Denies fevers nausea vomiting or diarrhea or hemoptysis but she does have a cough that is nonproductive   Review of Systems   All other systems reviewed and are negative.      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) 12/8/2018    Added automatically from request for surgery [id number]       No Known Allergies    Past Surgical History:   Procedure Laterality Date   • APPENDECTOMY     • ECTOPIC PREGNANCY  1975    Ruptured ectopic   • KYPHOPLASTY WITH BIOPSY N/A 12/10/2018    Procedure: KYPHOPLASTY WITH BIOPSY RADIOFREQUENCY TREATMENT TO TUMOR;  Surgeon: Erwin Benitez MD;  Location:  [facility];  Service: Neurosurgery   • LUMBAR LAMINECTOMY N/A 12/10/2018    Procedure: LUMBAR LAMINECTOMY WITHOUT FUSION L3 REMOVAL SPINAL TUMOR;  Surgeon: Erwin Benitez MD;  Location:  [facility];  Service: Neurosurgery   • TOTAL ABDOMINAL HYSTERECTOMY WITH SALPINGO OOPHORECTOMY  1978    DUB, Fibroids, benign   • VENOUS ACCESS DEVICE (PORT) INSERTION N/A 12/11/2018    Procedure: INSERTION VENOUS ACCESS DEVICE;  Surgeon: Ellie Griffith MD;  Location:  [facility];  Service: General   • VULVA BIOPSY Bilateral 12/10/2018    Procedure: VULVA BIOPSY;  Surgeon: Alonzo Lugo MD;  Location:  [facility];  Service: Obstetrics/Gynecology       History reviewed. No pertinent family history.    Social History     Socioeconomic History    • Marital status: Single     Spouse name: Not on file   • Number of children: Not on file   • Years of education: Not on file   • Highest education level: Not on file   Tobacco Use   • Smoking status: Current Every Day Smoker     Packs/day: 2.00     Years: 50.00     Pack years: 100.00     Types: Cigarettes   • Smokeless tobacco: Never Used   Substance and Sexual Activity   • Alcohol use: No     Frequency: Never   • Drug use: No   • Sexual activity: Defer     Partners: Male           Objective   Physical Exam   Constitutional: She is oriented to person, place, and time.   Appears chronically ill   HENT:   Head: Normocephalic and atraumatic.   Eyes: EOM are normal. Pupils are equal, round, and reactive to light.   Neck: Normal range of motion. Neck supple.   Cardiovascular: Tachycardia present.   Pulmonary/Chest: Effort normal. She has rhonchi in the left lower field.   Abdominal: Soft. Bowel sounds are normal.   Musculoskeletal: Normal range of motion.        Right lower leg: Normal.        Left lower leg: Normal.   Neurological: She is alert and oriented to person, place, and time.   Skin: Skin is warm and dry.   Psychiatric: She has a normal mood and affect. Her behavior is normal.   Nursing note and vitals reviewed.      Procedures           ED Course        Labs Reviewed   BLOOD CULTURE - Abnormal; Notable for the following components:       Result Value    Blood Culture Abnormal Stain (*)     Blood Culture Gram Positive Cocci (*)     All other components within normal limits   COMPREHENSIVE METABOLIC PANEL - Abnormal; Notable for the following components:    Creatinine 0.29 (*)     Sodium 129 (*)     Chloride 89 (*)     Calcium 8.0 (*)     Albumin 3.00 (*)     AST (SGOT) 52 (*)     Alkaline Phosphatase 139 (*)     A/G Ratio 0.7 (*)     BUN/Creatinine Ratio 58.6 (*)     All other components within normal limits   CBC WITH AUTO DIFFERENTIAL - Abnormal; Notable for the following components:    WBC 18.73 (*)      RBC 3.31 (*)     Hemoglobin 9.3 (*)     Hematocrit 27.5 (*)     Platelets 490 (*)     Neutrophil % 84.8 (*)     Lymphocyte % 6.1 (*)     Neutrophils, Absolute 15.86 (*)     Immature Grans, Absolute 0.36 (*)     All other components within normal limits   BNP (IN-HOUSE) - Abnormal; Notable for the following components:    proBNP 1,230.0 (*)     All other components within normal limits   PROTIME-INR - Abnormal; Notable for the following components:    Protime 15.0 (*)     INR 1.14 (*)     All other components within normal limits   APTT - Abnormal; Notable for the following components:    PTT 46.2 (*)     All other components within normal limits   COMPREHENSIVE METABOLIC PANEL - Abnormal; Notable for the following components:    Glucose 220 (*)     Creatinine 0.35 (*)     Sodium 132 (*)     Chloride 94 (*)     Calcium 7.7 (*)     Albumin 2.60 (*)     Alkaline Phosphatase 127 (*)     A/G Ratio 0.7 (*)     BUN/Creatinine Ratio 42.9 (*)     All other components within normal limits   CBC WITH AUTO DIFFERENTIAL - Abnormal; Notable for the following components:    WBC 13.12 (*)     RBC 3.48 (*)     Hemoglobin 9.8 (*)     Hematocrit 30.0 (*)     MCHC 32.7 (*)     Platelets 490 (*)     Neutrophil % 92.7 (*)     Lymphocyte % 4.0 (*)     Monocyte % 1.1 (*)     Neutrophils, Absolute 12.18 (*)     Lymphocytes, Absolute 0.52 (*)     Monocytes, Absolute 0.14 (*)     Immature Grans, Absolute 0.23 (*)     All other components within normal limits   LEGIONELLA ANTIGEN, URINE - Normal   STREP PNEUMO AG, URINE OR CSF - Normal   TROPONIN (IN-HOUSE) - Normal   TROPONIN (IN-HOUSE) - Normal   MAGNESIUM - Normal   PHOSPHORUS - Normal   LACTIC ACID, PLASMA - Normal   BLOOD CULTURE   RESPIRATORY CULTURE   MYCOPLASMA PNEUMONIAE PCR   RAINBOW DRAW    Narrative:     The following orders were created for panel order [facility] Draw.  Procedure                               Abnormality         Status                     ---------                                -----------         ------                     Light Blue Top[[id number]]                                   Final result               Green Top (Gel)[[id number]]                                  Final result               Lavender Top[[id number]]                                     Final result               Red Top[[id number]]                                          Final result                 Please view results for these tests on the individual orders.   CBC AND DIFFERENTIAL    Narrative:     The following orders were created for panel order CBC & Differential.  Procedure                               Abnormality         Status                     ---------                               -----------         ------                     CBC Auto Differential[[id number]]        Abnormal            Final result                 Please view results for these tests on the individual orders.   LIGHT BLUE TOP   GREEN TOP   LAVENDER TOP   RED TOP   CBC AND DIFFERENTIAL    Narrative:     The following orders were created for panel order CBC & Differential.  Procedure                               Abnormality         Status                     ---------                               -----------         ------                     CBC Auto Differential[[id number]]        Abnormal            Final result                 Please view results for these tests on the individual orders.     CT Angiogram Chest With Contrast   Final Result   1. No evidence of pulmonary embolus.   2. LEFT lower lobe lung mass with surrounding pneumonia. The pneumonia   is new since the previous study.   3. Mediastinal and LEFT hilar lymphadenopathy is again noted.   4. There is a new enlarged lymph node in the LEFT axilla that measures   1.3 cm in short axis.   5. New destructive changes in the inferior glenoid on the LEFT. This is   consistent with metastatic disease.   6. Large metastatic masses in the adrenal glands are again noted. These    have enlarged.           This report was finalized on 01/16/2019 12:47 by Dr. Yousif Herrera MD.      XR Chest 1 View   Final Result       1. New hazy opacities in the left upper lobe, questionable developing   pneumonia.   2. Known extensive left lower lobe tumor.   This report was finalized on 01/16/2019 11:20 by Dr Riley Acevedo, .                  MDM  Number of Diagnoses or Management Options  Diagnosis management comments: Pneumonia, will admit for hospital acquired pneumonia        Amount and/or Complexity of Data Reviewed  Clinical lab tests: ordered and reviewed  Tests in the radiology section of CPT®: reviewed and ordered  Tests in the medicine section of CPT®: reviewed and ordered  Decide to obtain previous medical records or to obtain history from someone other than the patient: yes    Risk of Complications, Morbidity, and/or Mortality  Presenting problems: moderate  Diagnostic procedures: moderate  Management options: moderate    Patient Progress  Patient progress: stable        Final diagnoses:   Hospital-acquired pneumonia            Chong Angeles PA-C  01/17/19 [id number]

## 2019-01-16 NOTE — H&P
[facility] Medicine Services  HISTORY AND PHYSICAL    Date of Admission: 1/16/2019  Primary Care Physician: Provider, No Known    Subjective     Chief Complaint: SOA    History of Present Illness  Mrs. Rios is a very pleasant 68 year old lady with a history of metastatic large cell neuroendocrine carcinoma.  She also has squamous cell carcinoma of the vagina.  She was previously hospitalized in December and underwent kyphoplasty with Dr. Benitez.      Since discharge, she has been doing ok.  She is undergoing palliative radiation to help with her pain.  She plans to pursue hospice after completing palliative radiation.      The last several days, she has had left sided chest pain and shortness of breath.  She herself thought this was likely pneumonia.  She has had cough and congestion.  She says the cough has been slightly productive with yellow sputum.  She denies fevers or chills.          Review of Systems   Constitutional: Positive for fatigue. Negative for fever.   HENT: Negative for congestion and ear pain.    Eyes: Negative for pain and visual disturbance.   Respiratory: Positive for cough and shortness of breath. Negative for wheezing.    Cardiovascular: Negative for chest pain and palpitations.   Gastrointestinal: Negative for diarrhea, nausea and vomiting.   Endocrine: Negative for heat intolerance.   Genitourinary: Negative for dysuria and frequency.   Musculoskeletal: Negative for arthralgias and back pain.   Skin: Negative for rash and wound.   Neurological: Negative for dizziness and light-headedness.   Psychiatric/Behavioral: Negative for confusion. The patient is not nervous/anxious.    All other systems reviewed and are negative.       Otherwise complete ROS reviewed and negative except as mentioned in the HPI.    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  site (CMS/[facility]) 12/8/2018    Added automatically from request for surgery [id number]     Past Surgical History:  Past Surgical History:   Procedure Laterality Date   • APPENDECTOMY     • ECTOPIC PREGNANCY  1975    Ruptured ectopic   • KYPHOPLASTY WITH BIOPSY N/A 12/10/2018    Procedure: KYPHOPLASTY WITH BIOPSY RADIOFREQUENCY TREATMENT TO TUMOR;  Surgeon: Erwin Benitez MD;  Location:  [facility] OR;  Service: Neurosurgery   • LUMBAR LAMINECTOMY N/A 12/10/2018    Procedure: LUMBAR LAMINECTOMY WITHOUT FUSION L3 REMOVAL SPINAL TUMOR;  Surgeon: Erwin Benitez MD;  Location:  [facility];  Service: Neurosurgery   • TOTAL ABDOMINAL HYSTERECTOMY WITH SALPINGO OOPHORECTOMY  1978    DUB, Fibroids, benign   • VENOUS ACCESS DEVICE (PORT) INSERTION N/A 12/11/2018    Procedure: INSERTION VENOUS ACCESS DEVICE;  Surgeon: Ellie Griffith MD;  Location:  [facility];  Service: General   • VULVA BIOPSY Bilateral 12/10/2018    Procedure: VULVA BIOPSY;  Surgeon: Alonzo Lugo MD;  Location:  [facility] OR;  Service: Obstetrics/Gynecology     Social History:  reports that she has been smoking cigarettes.  She has a 100.00 pack-year smoking history. she has never used smokeless tobacco. She reports that she does not drink alcohol or use drugs.    Family History: Cancer, heart disease, diabetes    Allergies:  No Known Allergies  Medications:  Prior to Admission medications    Medication Sig Start Date End Date Taking? Authorizing Provider   ALPRAZolam (XANAX) 0.5 MG tablet Take 0.5 mg by mouth 2 (Two) Times a Day As Needed for Anxiety.   Yes ProviderJennifer MD   fentaNYL (DURAGESIC) 25 MCG/HR patch Place 1 patch on the skin as directed by provider Every 72 (Seventy-Two) Hours.   Yes Jennifer Montiel MD   oxyCODONE-acetaminophen (PERCOCET)  MG per tablet Take 2 tablets by mouth Every 6 (Six) Hours As Needed for Moderate Pain . 12/19/18  Yes Erwin Benitez MD   pantoprazole (PROTONIX) 20 MG EC tablet Take 1 tablet by mouth Daily.  "12/13/18  Yes Pernell Aguiar APRN   aspirin 81 MG chewable tablet Chew 81 mg Daily.    Provider, MD Jennifer   cyclobenzaprine (FLEXERIL) 10 MG tablet Take 1 tablet by mouth Every 8 (Eight) Hours As Needed for Muscle Spasms. 12/1/18   Agata Molina APRN   ibuprofen (ADVIL,MOTRIN) 600 MG tablet Take 1 tablet by mouth Every 8 (Eight) Hours As Needed for Moderate Pain . 12/1/18   Agata Molina APRN   predniSONE (DELTASONE) 20 MG tablet Take 1 tablet by mouth 2 (Two) Times a Day. 12/1/18   Agata Molina APRN     Objective     Vital Signs: /64   Pulse 99   Temp 97.8 °F (36.6 °C)   Resp 18   Ht 152.4 cm (60\")   Wt 50.3 kg (111 lb)   SpO2 93%   BMI 21.68 kg/m²   Physical Exam   Constitutional: She is oriented to person, place, and time. She appears well-developed and well-nourished.   HENT:   Head: Normocephalic and atraumatic.   Right Ear: External ear normal.   Left Ear: External ear normal.   Nose: Nose normal.   Mouth/Throat: Oropharynx is clear and moist.   Eyes: Conjunctivae and EOM are normal.   Neck: Normal range of motion. Neck supple.   Cardiovascular: Normal rate, regular rhythm and normal heart sounds.   Pulmonary/Chest: Effort normal. She has decreased breath sounds. She has rhonchi.   Abdominal: Soft. Bowel sounds are normal. She exhibits no distension. There is no tenderness.   Musculoskeletal: Normal range of motion.   Neurological: She is alert and oriented to person, place, and time.   Skin: Skin is warm and dry.   Psychiatric: She has a normal mood and affect. Her speech is normal and behavior is normal. Cognition and memory are normal.           Results Reviewed:  Lab Results (last 24 hours)     Procedure Component Value Units Date/Time    Troponin [[id number]] Collected:  01/16/19 1359    Specimen:  Blood Updated:  01/16/19 1408    [place] Draw [[id number]] Collected:  01/16/19 1033    Specimen:  Blood Updated:  01/16/19 1145    Narrative:       The following " orders were created for panel order [place] Draw.  Procedure                               Abnormality         Status                     ---------                               -----------         ------                     Light Blue Top[[id number]]                                   Final result               Green Top (Gel)[[id number]]                                  Final result               Lavender Top[[id number]]                                     Final result               Red Top[[id number]]                                          Final result                 Please view results for these tests on the individual orders.    Green Top (Gel) [[id number]] Collected:  01/16/19 1033    Specimen:  Blood Updated:  01/16/19 1145     Extra Tube Hold for add-ons.     Comment: Auto resulted.       Light Blue Top [[id number]] Collected:  01/16/19 1033    Specimen:  Blood Updated:  01/16/19 1145     Extra Tube hold for add-on     Comment: Auto resulted       Lavender Top [[id number]] Collected:  01/16/19 1033    Specimen:  Blood Updated:  01/16/19 1145     Extra Tube hold for add-on     Comment: Auto resulted       Red Top [[id number]] Collected:  01/16/19 1033    Specimen:  Blood Updated:  01/16/19 1145     Extra Tube Hold for add-ons.     Comment: Auto resulted.       Troponin [[id number]]  (Normal) Collected:  01/16/19 1033    Specimen:  Blood Updated:  01/16/19 1110     Troponin I <0.012 ng/mL     BNP [[id number]]  (Abnormal) Collected:  01/16/19 1033    Specimen:  Blood Updated:  01/16/19 1110     proBNP 1,230.0 pg/mL     Comprehensive Metabolic Panel [[id number]]  (Abnormal) Collected:  01/16/19 1033    Specimen:  Blood Updated:  01/16/19 1058     Glucose 98 mg/dL      BUN 17 mg/dL      Creatinine 0.29 mg/dL      Sodium 129 mmol/L      Potassium 3.9 mmol/L      Chloride 89 mmol/L      CO2 31.0 mmol/L      Calcium 8.0 mg/dL      Total Protein 7.2 g/dL      Albumin 3.00 g/dL      ALT (SGPT) 23 U/L      AST (SGOT) 52 U/L       Alkaline Phosphatase 139 U/L      Total Bilirubin 0.5 mg/dL      eGFR Non African Amer >150 mL/min/1.73      Globulin 4.2 gm/dL      A/G Ratio 0.7 g/dL      BUN/Creatinine Ratio 58.6     Anion Gap 9.0 mmol/L     Phosphorus [[id number]]  (Normal) Collected:  01/16/19 1033    Specimen:  Blood Updated:  01/16/19 1058     Phosphorus 4.4 mg/dL     Magnesium [[id number]]  (Normal) Collected:  01/16/19 1033    Specimen:  Blood Updated:  01/16/19 1058     Magnesium 1.7 mg/dL     Protime-INR [[id number]]  (Abnormal) Collected:  01/16/19 1033    Specimen:  Blood Updated:  01/16/19 1058     Protime 15.0 Seconds      INR 1.14    aPTT [[id number]]  (Abnormal) Collected:  01/16/19 1033    Specimen:  Blood Updated:  01/16/19 1058     PTT 46.2 seconds     CBC & Differential [[id number]] Collected:  01/16/19 1033    Specimen:  Blood Updated:  01/16/19 1047    Narrative:       The following orders were created for panel order CBC & Differential.  Procedure                               Abnormality         Status                     ---------                               -----------         ------                     CBC Auto Differential[[id number]]        Abnormal            Final result                 Please view results for these tests on the individual orders.    CBC Auto Differential [[id number]]  (Abnormal) Collected:  01/16/19 1033    Specimen:  Blood Updated:  01/16/19 1047     WBC 18.73 10*3/mm3      RBC 3.31 10*6/mm3      Hemoglobin 9.3 g/dL      Hematocrit 27.5 %      MCV 83.1 fL      MCH 28.1 pg      MCHC 33.8 g/dL      RDW 14.8 %      RDW-SD 44.9 fl      MPV 9.5 fL      Platelets 490 10*3/mm3      Neutrophil % 84.8 %      Lymphocyte % 6.1 %      Monocyte % 6.8 %      Eosinophil % 0.0 %      Basophil % 0.4 %      Immature Grans % 1.9 %      Neutrophils, Absolute 15.86 10*3/mm3      Lymphocytes, Absolute 1.15 10*3/mm3      Monocytes, Absolute 1.28 10*3/mm3      Eosinophils, Absolute 0.00 10*3/mm3      Basophils, Absolute 0.08  10*3/mm3      Immature Grans, Absolute 0.36 10*3/mm3      nRBC 0.0 /100 WBC         Imaging Results (last 24 hours)     Procedure Component Value Units Date/Time    CT Angiogram Chest With Contrast [[id number]] Collected:  01/16/19 1236     Updated:  01/16/19 1250    Narrative:       CT ANGIOGRAM CHEST W CONTRAST- 1/16/2019 12:20 PM CST      HISTORY: Shortness of air and chest pain      COMPARISON: Radiation planning CT 12/31/2018 and CT of the chest  12/8/2018.      DOSE LENGTH PRODUCT: 405 mGy cm. Automated exposure control was also  utilized to decrease patient radiation dose.     TECHNIQUE: Helical tomographic images of the chest were obtained after  the administration of intravenous contrast following angiogram protocol.  Additionally, 3D and multiplanar reformatted images were provided.        FINDINGS:    Pulmonary arteries: There is no evidence of pulmonary embolus. The  pulmonary arteries are slightly enlarged, consistent with pulmonary  arterial hypertension     Aorta and great vessels: There is atherosclerosis in the aorta without  aneurysm or dissection. There is atherosclerosis in the great vessels.  The patient's port extends into the SVC.     Visualized neck base: The imaged portion of the base of the neck appears  unremarkable.      Lungs: There is a large mass in the LEFT lower lobe. Surrounding  consolidation is present. Areas of cavitation are seen within the mass.  These are new since the previous study. Moderate emphysematous changes  are present in the lungs.       Heart: The heart is normal in size. Coronary artery calcifications are  visualized. Trace pericardial fluid is present.      Mediastinum and lymph nodes: Mediastinal, LEFT hilar, and subcarinal  lymphadenopathy is unchanged. There is a new LEFT axillary lymph node  that measures 1.3 cm in diameter.      Skeletal and soft tissues: A metastatic lesion is seen in the inferior  glenoid process of the LEFT scapula. No other destructive  lesions are  seen in the visualized bones.     Upper abdomen: Enlarging adrenal metastases are seen bilaterally. These  measure up to 6.7 cm.       Impression:       1. No evidence of pulmonary embolus.  2. LEFT lower lobe lung mass with surrounding pneumonia. The pneumonia  is new since the previous study.  3. Mediastinal and LEFT hilar lymphadenopathy is again noted.  4. There is a new enlarged lymph node in the LEFT axilla that measures  1.3 cm in short axis.  5. New destructive changes in the inferior glenoid on the LEFT. This is  consistent with metastatic disease.  6. Large metastatic masses in the adrenal glands are again noted. These  have enlarged.        This report was finalized on 01/16/2019 12:47 by Dr. Yousif Herrera MD.    XR Chest 1 View [[id number]] Collected:  01/16/19 1118     Updated:  01/16/19 1123    Narrative:       History:  68-year-old chest pain.     Reference:  Chest radiograph December 11, 2018. CT chest December 2018.     Findings:  Frontal chest radiograph performed.     Extensive opacity in the left lower lobe base corresponds with known  tumor. New hazy opacity is in the mid left lower lobe and lingula. Lungs  are emphysematous. Questionable small left pleural effusion. No  pneumothorax. Left subclavian Port-A-Cath, tip in SVC.          Impression:          1. New hazy opacities in the left upper lobe, questionable developing  pneumonia.  2. Known extensive left lower lobe tumor.  This report was finalized on 01/16/2019 11:20 by Dr Riley Acevedo, .        I have personally reviewed and interpreted the radiology studies and ECG obtained at time of admission.     Assessment / Plan     Assessment:   Active Hospital Problems    Diagnosis   • Hospital-acquired pneumonia       1.  Simple Sepsis  -IV abx  -Cultures    2.  HAP  -IV abx  -Nebs  -mucinex    3.  Wide-spread metastatic cancer with multiple cancer types  -Consult her oncologist    4.  Tobacco abuse  -Recently quit    5.  HTN  -home  meds         Code Status: full     I discussed the patient's findings and my recommendations with the patient, patient's family    Estimated length of stay 2-3 days    Pernell Osorio MD   01/16/19   2:14 PM

## 2019-01-17 LAB
ALBUMIN SERPL-MCNC: 2.6 G/DL (ref 3.5–5)
ALBUMIN/GLOB SERPL: 0.7 G/DL (ref 1.1–2.5)
ALP SERPL-CCNC: 127 U/L (ref 24–120)
ALT SERPL W P-5'-P-CCNC: 19 U/L (ref 0–54)
ANION GAP SERPL CALCULATED.3IONS-SCNC: 12 MMOL/L (ref 4–13)
AST SERPL-CCNC: 22 U/L (ref 7–45)
BACTERIA BLD CULT: ABNORMAL
BASOPHILS # BLD AUTO: 0.05 10*3/MM3 (ref 0–0.2)
BASOPHILS NFR BLD AUTO: 0.4 % (ref 0–2)
BILIRUB SERPL-MCNC: 0.3 MG/DL (ref 0.1–1)
BUN BLD-MCNC: 15 MG/DL (ref 5–21)
BUN/CREAT SERPL: 42.9 (ref 7–25)
CALCIUM SPEC-SCNC: 7.7 MG/DL (ref 8.4–10.4)
CHLORIDE SERPL-SCNC: 94 MMOL/L (ref 98–110)
CO2 SERPL-SCNC: 26 MMOL/L (ref 24–31)
CREAT BLD-MCNC: 0.35 MG/DL (ref 0.5–1.4)
DEPRECATED RDW RBC AUTO: 47.4 FL (ref 40–54)
EOSINOPHIL # BLD AUTO: 0 10*3/MM3 (ref 0–0.7)
EOSINOPHIL NFR BLD AUTO: 0 % (ref 0–4)
ERYTHROCYTE [DISTWIDTH] IN BLOOD BY AUTOMATED COUNT: 15 % (ref 12–15)
GFR SERPL CREATININE-BSD FRML MDRD: >150 ML/MIN/1.73
GLOBULIN UR ELPH-MCNC: 3.7 GM/DL
GLUCOSE BLD-MCNC: 220 MG/DL (ref 70–100)
HCT VFR BLD AUTO: 30 % (ref 37–47)
HGB BLD-MCNC: 9.8 G/DL (ref 12–16)
IMM GRANULOCYTES # BLD AUTO: 0.23 10*3/MM3 (ref 0–0.03)
IMM GRANULOCYTES NFR BLD AUTO: 1.8 % (ref 0–5)
L PNEUMO1 AG UR QL IA: NEGATIVE
LYMPHOCYTES # BLD AUTO: 0.52 10*3/MM3 (ref 0.72–4.86)
LYMPHOCYTES NFR BLD AUTO: 4 % (ref 15–45)
MCH RBC QN AUTO: 28.2 PG (ref 28–32)
MCHC RBC AUTO-ENTMCNC: 32.7 G/DL (ref 33–36)
MCV RBC AUTO: 86.2 FL (ref 82–98)
MONOCYTES # BLD AUTO: 0.14 10*3/MM3 (ref 0.19–1.3)
MONOCYTES NFR BLD AUTO: 1.1 % (ref 4–12)
NEUTROPHILS # BLD AUTO: 12.18 10*3/MM3 (ref 1.87–8.4)
NEUTROPHILS NFR BLD AUTO: 92.7 % (ref 39–78)
NRBC BLD AUTO-RTO: 0 /100 WBC (ref 0–0)
PLATELET # BLD AUTO: 490 10*3/MM3 (ref 130–400)
PMV BLD AUTO: 9.8 FL (ref 6–12)
POTASSIUM BLD-SCNC: 4.4 MMOL/L (ref 3.5–5.3)
PROT SERPL-MCNC: 6.3 G/DL (ref 6.3–8.7)
RBC # BLD AUTO: 3.48 10*6/MM3 (ref 4.2–5.4)
S PNEUM AG SPEC QL LA: NEGATIVE
SODIUM BLD-SCNC: 132 MMOL/L (ref 135–145)
WBC NRBC COR # BLD: 13.12 10*3/MM3 (ref 4.8–10.8)

## 2019-01-17 PROCEDURE — 25010000002 CEFEPIME PER 500 MG: Performed by: FAMILY MEDICINE

## 2019-01-17 PROCEDURE — 99223 1ST HOSP IP/OBS HIGH 75: CPT | Performed by: CLINICAL NURSE SPECIALIST

## 2019-01-17 PROCEDURE — 85025 COMPLETE CBC W/AUTO DIFF WBC: CPT | Performed by: FAMILY MEDICINE

## 2019-01-17 PROCEDURE — 25010000002 ENOXAPARIN PER 10 MG: Performed by: NURSE PRACTITIONER

## 2019-01-17 PROCEDURE — 77417 THER RADIOLOGY PORT IMAGE(S): CPT | Performed by: RADIOLOGY

## 2019-01-17 PROCEDURE — 87899 AGENT NOS ASSAY W/OPTIC: CPT | Performed by: FAMILY MEDICINE

## 2019-01-17 PROCEDURE — 94799 UNLISTED PULMONARY SVC/PX: CPT

## 2019-01-17 PROCEDURE — 77412 RADIATION TX DELIVERY LVL 3: CPT | Performed by: RADIOLOGY

## 2019-01-17 PROCEDURE — 80053 COMPREHEN METABOLIC PANEL: CPT | Performed by: FAMILY MEDICINE

## 2019-01-17 PROCEDURE — 94760 N-INVAS EAR/PLS OXIMETRY 1: CPT

## 2019-01-17 PROCEDURE — 99498 ADVNCD CARE PLAN ADDL 30 MIN: CPT | Performed by: CLINICAL NURSE SPECIALIST

## 2019-01-17 PROCEDURE — 99497 ADVNCD CARE PLAN 30 MIN: CPT | Performed by: CLINICAL NURSE SPECIALIST

## 2019-01-17 PROCEDURE — 25010000002 VANCOMYCIN 10 G RECONSTITUTED SOLUTION: Performed by: FAMILY MEDICINE

## 2019-01-17 RX ORDER — MEGESTROL ACETATE 40 MG/ML
400 SUSPENSION ORAL DAILY
Status: DISCONTINUED | OUTPATIENT
Start: 2019-01-17 | End: 2019-01-20 | Stop reason: HOSPADM

## 2019-01-17 RX ADMIN — GUAIFENESIN 1200 MG: 600 TABLET, EXTENDED RELEASE ORAL at 21:41

## 2019-01-17 RX ADMIN — MEGESTROL ACETATE 400 MG: 40 SUSPENSION ORAL at 15:53

## 2019-01-17 RX ADMIN — ENOXAPARIN SODIUM 30 MG: 30 INJECTION SUBCUTANEOUS at 09:40

## 2019-01-17 RX ADMIN — CEFEPIME HYDROCHLORIDE 1 G: 1 INJECTION, POWDER, FOR SOLUTION INTRAMUSCULAR; INTRAVENOUS at 15:30

## 2019-01-17 RX ADMIN — PANTOPRAZOLE SODIUM 20 MG: 20 TABLET, DELAYED RELEASE ORAL at 08:27

## 2019-01-17 RX ADMIN — OXYCODONE HYDROCHLORIDE AND ACETAMINOPHEN 2 TABLET: 10; 325 TABLET ORAL at 08:54

## 2019-01-17 RX ADMIN — OXYCODONE HYDROCHLORIDE AND ACETAMINOPHEN 2 TABLET: 10; 325 TABLET ORAL at 15:29

## 2019-01-17 RX ADMIN — CEFEPIME HYDROCHLORIDE 1 G: 1 INJECTION, POWDER, FOR SOLUTION INTRAMUSCULAR; INTRAVENOUS at 04:01

## 2019-01-17 RX ADMIN — ALPRAZOLAM 0.5 MG: 0.5 TABLET ORAL at 21:42

## 2019-01-17 RX ADMIN — OXYCODONE HYDROCHLORIDE AND ACETAMINOPHEN 2 TABLET: 10; 325 TABLET ORAL at 02:48

## 2019-01-17 RX ADMIN — VANCOMYCIN HYDROCHLORIDE 750 MG: 10 INJECTION, POWDER, LYOPHILIZED, FOR SOLUTION INTRAVENOUS at 16:27

## 2019-01-17 RX ADMIN — VANCOMYCIN HYDROCHLORIDE 750 MG: 10 INJECTION, POWDER, LYOPHILIZED, FOR SOLUTION INTRAVENOUS at 04:58

## 2019-01-17 RX ADMIN — OXYCODONE HYDROCHLORIDE AND ACETAMINOPHEN 2 TABLET: 10; 325 TABLET ORAL at 21:41

## 2019-01-17 RX ADMIN — GUAIFENESIN 1200 MG: 600 TABLET, EXTENDED RELEASE ORAL at 08:27

## 2019-01-17 RX ADMIN — Medication 81 MG: at 08:27

## 2019-01-17 RX ADMIN — POLYETHYLENE GLYCOL 3350 17 G: 17 POWDER, FOR SOLUTION ORAL at 18:00

## 2019-01-17 RX ADMIN — ALPRAZOLAM 0.5 MG: 0.5 TABLET ORAL at 12:38

## 2019-01-17 NOTE — PROGRESS NOTES
[facility] Medicine Services  INPATIENT PROGRESS NOTE    Length of Stay: 1  Date of Admission: 1/16/2019  Primary Care Physician: Provider, No Known    Subjective   Chief Complaint: short of breath improved  HPI   Sitting up in bed.  Family in room.  Shortness of breath improved.  Oxygen off.  No sputum production.  Reports left sided chest discomfort from coughing.  Occasional light yellow sputum production.  Denies fever or chills.  Denies nausea, vomiting or abdominal pain.  A turkey sandwich last night.  Reported decreased appetite at home.  Undergoing radiation.    Review of Systems   Constitutional: Positive for fatigue.   HENT: Negative for congestion and trouble swallowing.    Eyes: Negative for photophobia and visual disturbance.   Respiratory: Positive for cough and shortness of breath. Negative for wheezing.    Cardiovascular: Negative for chest pain, palpitations and leg swelling.   Gastrointestinal: Negative for constipation, diarrhea, nausea and vomiting.   Endocrine: Negative for cold intolerance, heat intolerance and polyuria.   Genitourinary: Negative for dysuria and urgency.   Musculoskeletal: Positive for gait problem.   Skin: Negative for wound.   Allergic/Immunologic: Negative for immunocompromised state.   Neurological: Positive for weakness.   Hematological: Negative for adenopathy. Does not bruise/bleed easily.   Psychiatric/Behavioral: Negative for agitation, behavioral problems and confusion.      All pertinent negatives and positives are as above. All other systems have been reviewed and are negative unless otherwise stated.     Objective    Temp:  [97 °F (36.1 °C)-98.3 °F (36.8 °C)] 97.8 °F (36.6 °C)  Heart Rate:  [] 106  Resp:  [12-18] 16  BP: (101-131)/(58-83) 131/77  Physical Exam   Constitutional: She is oriented to person, place, and time. She appears well-developed and well-nourished.   HENT:   Head: Normocephalic and atraumatic.   Eyes:  EOM are normal. Pupils are equal, round, and reactive to light.   Neck: Normal range of motion. Neck supple.   Cardiovascular: Normal rate, regular rhythm, normal heart sounds and intact distal pulses. Exam reveals no gallop and no friction rub.   No murmur heard.  Pulmonary/Chest: Effort normal and breath sounds normal. She has no wheezes. She has no rales.   Decreased breath sounds.  Faint rhonchi left posterior base.  No oxygen in use.   Abdominal: Soft. Bowel sounds are normal.   Genitourinary:   Genitourinary Comments: Voiding.   Musculoskeletal: Normal range of motion. She exhibits no edema or deformity.   Neurological: She is alert and oriented to person, place, and time.   Skin: Skin is warm and dry.   Psychiatric: She has a normal mood and affect. Her behavior is normal. Judgment and thought content normal.     Results Review:  I have reviewed the labs, radiology results, and diagnostic studies.    Laboratory Data:   Results from last 7 days   Lab Units 01/17/19  0447 01/16/19  1033   WBC 10*3/mm3 13.12* 18.73*   HEMOGLOBIN g/dL 9.8* 9.3*   HEMATOCRIT % 30.0* 27.5*   PLATELETS 10*3/mm3 490* 490*        Results from last 7 days   Lab Units 01/17/19  0447 01/16/19  1033   SODIUM mmol/L 132* 129*   POTASSIUM mmol/L 4.4 3.9   CHLORIDE mmol/L 94* 89*   CO2 mmol/L 26.0 31.0   BUN mg/dL 15 17   CREATININE mg/dL 0.35* 0.29*   CALCIUM mg/dL 7.7* 8.0*   BILIRUBIN mg/dL 0.3 0.5   ALK PHOS U/L 127* 139*   ALT (SGPT) U/L 19 23   AST (SGOT) U/L 22 52*   GLUCOSE mg/dL 220* 98     Blood Culture   Date Value Ref Range Status   01/16/2019 No growth at less than 24 hours  Preliminary   01/16/2019 No growth at less than 24 hours  Preliminary     Respiratory Culture   Date Value Ref Range Status   01/16/2019 No growth  Preliminary     Imaging Results (all)     Procedure Component Value Units Date/Time    CT Angiogram Chest With Contrast [[id number]] Collected:  01/16/19 1236     Updated:  01/16/19 1250    Narrative:       CT  ANGIOGRAM CHEST W CONTRAST- 1/16/2019 12:20 PM CST      HISTORY: Shortness of air and chest pain      COMPARISON: Radiation planning CT 12/31/2018 and CT of the chest  12/8/2018.      DOSE LENGTH PRODUCT: 405 mGy cm. Automated exposure control was also  utilized to decrease patient radiation dose.     TECHNIQUE: Helical tomographic images of the chest were obtained after  the administration of intravenous contrast following angiogram protocol.  Additionally, 3D and multiplanar reformatted images were provided.        FINDINGS:    Pulmonary arteries: There is no evidence of pulmonary embolus. The  pulmonary arteries are slightly enlarged, consistent with pulmonary  arterial hypertension     Aorta and great vessels: There is atherosclerosis in the aorta without  aneurysm or dissection. There is atherosclerosis in the great vessels.  The patient's port extends into the SVC.     Visualized neck base: The imaged portion of the base of the neck appears  unremarkable.      Lungs: There is a large mass in the LEFT lower lobe. Surrounding  consolidation is present. Areas of cavitation are seen within the mass.  These are new since the previous study. Moderate emphysematous changes  are present in the lungs.       Heart: The heart is normal in size. Coronary artery calcifications are  visualized. Trace pericardial fluid is present.      Mediastinum and lymph nodes: Mediastinal, LEFT hilar, and subcarinal  lymphadenopathy is unchanged. There is a new LEFT axillary lymph node  that measures 1.3 cm in diameter.      Skeletal and soft tissues: A metastatic lesion is seen in the inferior  glenoid process of the LEFT scapula. No other destructive lesions are  seen in the visualized bones.     Upper abdomen: Enlarging adrenal metastases are seen bilaterally. These  measure up to 6.7 cm.       Impression:       1. No evidence of pulmonary embolus.  2. LEFT lower lobe lung mass with surrounding pneumonia. The pneumonia  is new since  the previous study.  3. Mediastinal and LEFT hilar lymphadenopathy is again noted.  4. There is a new enlarged lymph node in the LEFT axilla that measures  1.3 cm in short axis.  5. New destructive changes in the inferior glenoid on the LEFT. This is  consistent with metastatic disease.  6. Large metastatic masses in the adrenal glands are again noted. These  have enlarged.        This report was finalized on 01/16/2019 12:47 by Dr. Yousif Herrera MD.    XR Chest 1 View [[id number]] Collected:  01/16/19 1118     Updated:  01/16/19 1123    Narrative:       History:  68-year-old chest pain.     Reference:  Chest radiograph December 11, 2018. CT chest December 2018.     Findings:  Frontal chest radiograph performed.     Extensive opacity in the left lower lobe base corresponds with known  tumor. New hazy opacity is in the mid left lower lobe and lingula. Lungs  are emphysematous. Questionable small left pleural effusion. No  pneumothorax. Left subclavian Port-A-Cath, tip in SVC.          Impression:          1. New hazy opacities in the left upper lobe, questionable developing  pneumonia.  2. Known extensive left lower lobe tumor.  This report was finalized on 01/16/2019 11:20 by Dr Riley Acevedo, .        Intake/Output    Intake/Output Summary (Last 24 hours) at 1/17/2019 0812  Last data filed at 1/17/2019 0641  Gross per 24 hour   Intake 800 ml   Output 600 ml   Net 200 ml       Scheduled Meds    aspirin 81 mg Oral Daily   cefepime 1 g Intravenous Q12H   fentaNYL 1 patch Transdermal Q72H   guaiFENesin 1,200 mg Oral Q12H   pantoprazole 20 mg Oral Daily   vancomycin 15 mg/kg Intravenous Q12H       I have reviewed the patient current medications.     Assessment/Plan     Assessment:  1.  Simple sepsis  2.  Hospital-acquired pneumonia, left lower lobe  3.  Widespread metastatic cancer  4.  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  Vulvar, right labial mass lesion, primary squamous cell carcinoma  6.   Normocytic anemia likely of malignancy, anemia chronic disease  7.  Thrombocytosis suspect reactive  8.  Long-standing tobacco use, recently quit  9.  Essential hypertension  10.  Leukocytosis, resolving  11.  Anemia, improving    Plan:  1.  Received aspirin, normal saline fluid bolus, Zosyn in ER  2.  Cefepime IV, azithromycin IV day 2  3.  Mucinex 1200 mg twice daily  4.  Duo nebs every 4 hours as needed  5.  Legionella negative, strep pneumonia negative  6.  Sputum culture no growth  7.  Blood culture no growth less than 24 hours  8.  Oncology, Dr. Rincon following  9.  Resume XRT today  10.  Home medications reviewed and appropriate medications resumed.  11.  Lovenox for deep vein thrombosis prophylaxis  12.  Gentle IV fluid hydration normal saline at 50 mL per hour for hyponatremia, suspect component of both volume depletion and cancer process.  13.  CBC, basic metabolic panel tomorrow  14.  Out of bed and ambulate.  15.  Discussed with Dr. Velasco today.  Patient interested in speaking with palliative care.    Her-2 sons will act as her surrogate decision makers  The above documentation resulted from a face-to-face encounter by me Beatriz ROCA, [facility].      Discharge Planning: I expect patient to be discharged to home in 2 days.    ABELINO Wray   01/17/19   8:12 AM

## 2019-01-17 NOTE — PROGRESS NOTES
Malnutrition Severity Assessment    Patient Name:  Kasey Rios  YOB: 1950  MRN: [id number]  Admit Date:  1/16/2019    Patient meets criteria for : Moderate malnutrition    Comments:  If in agreement with malnutrition assessment, please attest documentation. Thanks.     Malnutrition Type: Chronic Illness Malnutrition     Malnutrition Type (last 8 hours)      Malnutrition Severity Assessment     Row Name 01/17/19 1455       Malnutrition Severity Assessment    Malnutrition Type  Chronic Illness Malnutrition    Row Name 01/17/19 1455       Physical Signs of Malnutrition (Chronic)    Muscle Wasting  Mild moderate depression of temporalis    Fat Loss  Mild somewhat hollow appearing orbital fat pad with darkened circles; some depression of buccal fat pad; loose tissue covering ribs with some, not ample fat; loose tissue to upper arms with some but not ample fat tissue    Secondary Physical Signs  Present (comment) flaky/dry/pale skin with coccyx skin tear; generalized weakness    Row Name 01/17/19 1455       Weight Status (Chronic)    %UBW  Severe (<75%) 65%    Weight Loss  Severe (>10% / 6 mo) 15% (20+ lbs over past couple months per Pt)    Row Name 01/17/19 1455       Energy Intake Status (Chronic)    Energy Intake  Mod (<75% / > or equal to 1 mo)    Row Name 01/17/19 1455       Criteria Met (Must meet criteria for severity in at least 2 of these categories: M Wasting, Fat Loss, Fluid, Secondary Signs, Wt. Status, Intake)    Patient meets criteria for   Moderate malnutrition          Electronically signed by:  Mary Ji RDN, LD  01/17/19 3:27 PM

## 2019-01-17 NOTE — PROGRESS NOTES
Discharge Planning Assessment  [facility]     Patient Name: Kasey Rios  MRN: [id number]  Today's Date: 1/17/2019    Admit Date: 1/16/2019    Discharge Needs Assessment     Row Name 01/17/19 1026       Living Environment    Lives With  child(jessica), adult    Name(s) of Who Lives With Patient  Lele    Current Living Arrangements  home/apartment/condo    Primary Care Provided by  child(jessica)    Provides Primary Care For  no one, unable/limited ability to care for self    Family Caregiver if Needed  child(jessica), adult    Quality of Family Relationships  supportive;involved;helpful    Able to Return to Prior Arrangements  yes       Resource/Environmental Concerns    Resource/Environmental Concerns  none    Transportation Concerns  other (see comments)       Transition Planning    Patient/Family Anticipates Transition to  home with family    Patient/Family Anticipated Services at Transition  none    Transportation Anticipated  health plan transportation       Discharge Needs Assessment    Readmission Within the Last 30 Days  no previous admission in last 30 days    Concerns to be Addressed  denies needs/concerns at this time    Equipment Currently Used at Home  walker, rolling;wheelchair;hospital bed;commode    Anticipated Changes Related to Illness  none    Equipment Needed After Discharge  none    Current Discharge Risk  chronically ill        Discharge Plan     Row Name 01/17/19 1027       Plan    Plan  Home    Patient/Family in Agreement with Plan  yes    Plan Comments  Spoke with patient to assess for home needs. Pt lives with her son and plans same.  Pt has needed DME, other needs are not covered via ins (would like bed side table and shower chair).  Pt is interested in being followed by [facility], consent signed.  [facility] but was discontinued.  Will follow.         Destination      No service coordination in this encounter.      Durable Medical Equipment      No service  coordination in this encounter.      Dialysis/Infusion      No service coordination in this encounter.      Home Medical Care      No service coordination in this encounter.      Community Resources      No service coordination in this encounter.          Demographic Summary    No documentation.       Functional Status    No documentation.       Psychosocial    No documentation.       Abuse/Neglect    No documentation.       Legal    No documentation.       Substance Abuse    No documentation.       Patient Forms    No documentation.           VADIM Sommer

## 2019-01-17 NOTE — PROGRESS NOTES
"[facility] Progress Note    Progress Note    Patient:  Kasey Rios  YOB: 1950  Date of Service: 1/17/2019  MRN: [id number]   Acct: [id number]   Primary Care Physician: Provider, No Known  Advance Directive:   Code Status and Medical Interventions:   Ordered at: 01/16/19 1532     Level Of Support Discussed With:    Patient     Code Status:    CPR     Medical Interventions (Level of Support Prior to Arrest):    Full     Admit Date: 1/16/2019       Hospital Day: 1      Subjective:     Chief Compliant: \"Better.\"    Subjective: Less cough productive of scant, yellow phlegm.  Improved chest pain.      Interval history: None     Review of Systems:   Constitutional / general:  No fever /No chills /No sweats  HEENT: No sore throat /No hoarseness /No vision changes  CV:  (+) chest pain /No palpitations/No orthopnea   Respiratory:  (+) cough /(+) shortness of breath /(+) sputum /No hemoptysis  GI: No nausea /No vomiting /No abdominal pain /No diarrhea /No constipation  :  (+) dysuria /No hesitancy /No urgency /No hematuria   Neuro: (+) muscle weakness /No dysphagia /No headache /No paresthesias  Musculoskeletal:  No edema /No cyanosis /Improved (+) pain    Vitals: /77 (BP Location: Right arm, Patient Position: Lying)   Pulse 106   Temp 97.8 °F (36.6 °C) (Oral)   Resp 16   Ht 152.4 cm (60\")   Wt 50.3 kg (111 lb)   SpO2 96%   BMI 21.68 kg/m²     General appearance: alert, chronically ill appearing, appears stated age and cooperative in bed.  No family at bedside  Skin: Skin color pale, texture, turgor normal. No rashes or lesions  HEENT: Head: Normal, normocephalic, atraumatic.  Neck: no adenopathy, no carotid bruit, no JVD, supple, symmetrical, trachea midline and thyroid not enlarged, symmetric, no tenderness/mass/nodules  Lungs: diminished breath sounds bilaterally  Heart: regular rate and rhythm  Abdomen: soft, non-tender; bowel sounds normal; no masses,  no " organomegaly  Extremities: extremities normal, atraumatic, no cyanosis or edema  Neurologic: Mental status: Alert, oriented, thought content appropriate    24HR INTAKE/OUTPUT:      Intake/Output Summary (Last 24 hours) at 1/17/2019 0820  Last data filed at 1/17/2019 0641  Gross per 24 hour   Intake 800 ml   Output 600 ml   Net 200 ml        [place] Catheter: None    Diet: Diet Regular      Medications:   Scheduled Meds:  aspirin 81 mg Oral Daily   cefepime 1 g Intravenous Q12H   fentaNYL 1 patch Transdermal Q72H   guaiFENesin 1,200 mg Oral Q12H   pantoprazole 20 mg Oral Daily   vancomycin 15 mg/kg Intravenous Q12H       Continuous Infusions:     Labs:     Results from last 7 days   Lab Units 01/17/19  0447 01/16/19  1033   WBC 10*3/mm3 13.12* 18.73*   HEMOGLOBIN g/dL 9.8* 9.3*   HEMATOCRIT % 30.0* 27.5*   PLATELETS 10*3/mm3 490* 490*        Results from last 7 days   Lab Units 01/17/19  0447 01/16/19  1033   SODIUM mmol/L 132* 129*   POTASSIUM mmol/L 4.4 3.9   CHLORIDE mmol/L 94* 89*   CO2 mmol/L 26.0 31.0   BUN mg/dL 15 17   CREATININE mg/dL 0.35* 0.29*   CALCIUM mg/dL 7.7* 8.0*   BILIRUBIN mg/dL 0.3 0.5   ALK PHOS U/L 127* 139*   ALT (SGPT) U/L 19 23   AST (SGOT) U/L 22 52*   GLUCOSE mg/dL 220* 98       ABG:  No results found for: PHART, PO2ART, IZX1MWG    Culture Data:   Blood Culture   Date Value Ref Range Status   01/16/2019 No growth at less than 24 hours  Preliminary   01/16/2019 No growth at less than 24 hours  Preliminary     Respiratory Culture   Date Value Ref Range Status   01/16/2019 No growth  Preliminary       Radiology Data:   Imaging Results (last 72 hours)     Procedure Component Value Units Date/Time    CT Angiogram Chest With Contrast [[id number]] Collected:  01/16/19 1236     Updated:  01/16/19 1250    Narrative:       CT ANGIOGRAM CHEST W CONTRAST- 1/16/2019 12:20 PM CST      HISTORY: Shortness of air and chest pain      COMPARISON: Radiation planning CT 12/31/2018 and CT of the chest  12/8/2018.       DOSE LENGTH PRODUCT: 405 mGy cm. Automated exposure control was also  utilized to decrease patient radiation dose.     TECHNIQUE: Helical tomographic images of the chest were obtained after  the administration of intravenous contrast following angiogram protocol.  Additionally, 3D and multiplanar reformatted images were provided.        FINDINGS:    Pulmonary arteries: There is no evidence of pulmonary embolus. The  pulmonary arteries are slightly enlarged, consistent with pulmonary  arterial hypertension     Aorta and great vessels: There is atherosclerosis in the aorta without  aneurysm or dissection. There is atherosclerosis in the great vessels.  The patient's port extends into the SVC.     Visualized neck base: The imaged portion of the base of the neck appears  unremarkable.      Lungs: There is a large mass in the LEFT lower lobe. Surrounding  consolidation is present. Areas of cavitation are seen within the mass.  These are new since the previous study. Moderate emphysematous changes  are present in the lungs.       Heart: The heart is normal in size. Coronary artery calcifications are  visualized. Trace pericardial fluid is present.      Mediastinum and lymph nodes: Mediastinal, LEFT hilar, and subcarinal  lymphadenopathy is unchanged. There is a new LEFT axillary lymph node  that measures 1.3 cm in diameter.      Skeletal and soft tissues: A metastatic lesion is seen in the inferior  glenoid process of the LEFT scapula. No other destructive lesions are  seen in the visualized bones.     Upper abdomen: Enlarging adrenal metastases are seen bilaterally. These  measure up to 6.7 cm.       Impression:       1. No evidence of pulmonary embolus.  2. LEFT lower lobe lung mass with surrounding pneumonia. The pneumonia  is new since the previous study.  3. Mediastinal and LEFT hilar lymphadenopathy is again noted.  4. There is a new enlarged lymph node in the LEFT axilla that measures  1.3 cm in short  axis.  5. New destructive changes in the inferior glenoid on the LEFT. This is  consistent with metastatic disease.  6. Large metastatic masses in the adrenal glands are again noted. These  have enlarged.        This report was finalized on 01/16/2019 12:47 by Dr. Yousif Herrera MD.    XR Chest 1 View [[id number]] Collected:  01/16/19 1118     Updated:  01/16/19 1123    Narrative:       History:  68-year-old chest pain.     Reference:  Chest radiograph December 11, 2018. CT chest December 2018.     Findings:  Frontal chest radiograph performed.     Extensive opacity in the left lower lobe base corresponds with known  tumor. New hazy opacity is in the mid left lower lobe and lingula. Lungs  are emphysematous. Questionable small left pleural effusion. No  pneumothorax. Left subclavian Port-A-Cath, tip in SVC.          Impression:          1. New hazy opacities in the left upper lobe, questionable developing  pneumonia.  2. Known extensive left lower lobe tumor.  This report was finalized on 01/16/2019 11:20 by Dr Riley Acevedo, .              Radiology:       Objective:       Problem list:     Hospital-acquired pneumonia        Assessment/Plan:     ASSESSMENT:  1.    Hospital acquired pneumonia.           CONCURRENT PROBLEMS:  1.    Neuroendocrine large cell carcinoma.   Tumor stage:  Extensive stage (cT4 cN2 M1).  Tumor burden: 5.6 x 5.7 cm mass within the left lower lobe with 1.3 cm peripheral satellite lesion in the left lower lobe, pleural based paraspinal mass in the medial left lung, 2.2 x 1.5 cm contiguous with infrahilar lymphadenopathy, and 2.5 x 3.9 cm contiguous mass posterior to the left atrium inseparable from the esophagus. 4.9 x 2.9 cm prevascular node. Effacement of the distal left main pulmonary artery along its posterior margin related to the hilar component of the neoplasm. Bilateral adrenal metastases (5.5 x 6.1 on the left and 6.7 x 5.4 cm on the right). Interaortocaval node 1.9 x 2.3 cm. Pleural  studding. Ill-defined hypodense lesion involving the posterior interpolar aspect of the left kidney suspicious for metastatic disease. L3 vertebral body epidural mass associated with extension into the ventral epidural space (1.4 x 1.0 cm extradural mass associated with effacement of the thecal sac).   Complications of tumor: Acute 2 column fracture involving L3 vertebral body involving the anterior and middle column and right pedicle with associated edema. Resultant myelopathic symptoms with radicular pain to the right leg and inability to walk.  Tumor status: Untreated.  Radiographic evidence for further disease progression  Prognosis: Poor.  2.    Vulvar/right labial mass/lesion. Primary squamous cell carcinoma.  3.    Normocytic anemia of malignancy (anemia of chronic disease). Ferritin 106, B12 of 214 (depressed) on 12/10/2018.   4.    Thrombocytosis, likely reactive.  5.    Long-standing tobacco smoker (1/2 pack per day since age 18).  6.    Coronary artery calcifications (right coronary left anterior descending (LAD) and circumflex distributions).  7.    Poor performance status (ECOG 3-4).  8.    Poor overall prognosis.     PLAN:  1.     Re: Apprised of information   2.    Antibiotics and medical management.    3.    No further oncologic workup.   4.    Palliative care consult.  Acknowledges that hospice is the route to take after completion of palliative radiation.   5.    Radiation oncology consult to resume palliative radiation to the vaginal lesion  6.    Care discussed with JOE Garza

## 2019-01-17 NOTE — PLAN OF CARE
Problem: Patient Care Overview  Goal: Plan of Care Review  Outcome: Ongoing (interventions implemented as appropriate)   01/17/19 0914   Coping/Psychosocial   Plan of Care Reviewed With patient   Plan of Care Review   Progress no change   OTHER   Outcome Summary Pt has been resting most of shift. PRN percocet given for pain. Patient has had trouble voiding this shift. Bladder scanned 837. Got patient up to bedside commode and she voided 350. Very painful for patient to void. Gave percocet early due to so much pain. Patient also had a reaction to vancomycin. Gave IV benadryl and solumedrol. Cont to infuse but at a slower rate. Cont to monitor.         Problem: Fall Risk (Adult)  Goal: Identify Related Risk Factors and Signs and Symptoms  Outcome: Ongoing (interventions implemented as appropriate)    Goal: Absence of Fall  Outcome: Ongoing (interventions implemented as appropriate)      Problem: Skin Injury Risk (Adult)  Goal: Identify Related Risk Factors and Signs and Symptoms  Outcome: Ongoing (interventions implemented as appropriate)    Goal: Skin Health and Integrity  Outcome: Ongoing (interventions implemented as appropriate)      Problem: Pneumonia (Adult)  Goal: Signs and Symptoms of Listed Potential Problems Will be Absent, Minimized or Managed (Pneumonia)  Outcome: Ongoing (interventions implemented as appropriate)      Problem: Pain, Chronic (Adult)  Goal: Acceptable Pain/Comfort Level and Functional Ability  Outcome: Ongoing (interventions implemented as appropriate)

## 2019-01-17 NOTE — PLAN OF CARE
Problem: Patient Care Overview  Goal: Plan of Care Review  Outcome: Ongoing (interventions implemented as appropriate)   01/17/19 4103   Coping/Psychosocial   Plan of Care Reviewed With patient   Plan of Care Review   Progress no change   OTHER   Outcome Summary Pt c/o generalized pain throughout shift. Medicated with PRN meds with some relief. Anxiety meds given x1. Pallitive care consulted. Pallitive radiation resumed. Family at bedside. Will continue to monitor.      Goal: Individualization and Mutuality  Outcome: Ongoing (interventions implemented as appropriate)    Goal: Discharge Needs Assessment  Outcome: Ongoing (interventions implemented as appropriate)    Goal: Interprofessional Rounds/Family Conf  Outcome: Ongoing (interventions implemented as appropriate)      Problem: Fall Risk (Adult)  Goal: Identify Related Risk Factors and Signs and Symptoms  Outcome: Ongoing (interventions implemented as appropriate)    Goal: Absence of Fall  Outcome: Ongoing (interventions implemented as appropriate)      Problem: Skin Injury Risk (Adult)  Goal: Identify Related Risk Factors and Signs and Symptoms  Outcome: Ongoing (interventions implemented as appropriate)    Goal: Skin Health and Integrity  Outcome: Ongoing (interventions implemented as appropriate)      Problem: Pneumonia (Adult)  Goal: Signs and Symptoms of Listed Potential Problems Will be Absent, Minimized or Managed (Pneumonia)  Outcome: Ongoing (interventions implemented as appropriate)      Problem: Pain, Chronic (Adult)  Goal: Acceptable Pain/Comfort Level and Functional Ability  Outcome: Ongoing (interventions implemented as appropriate)

## 2019-01-17 NOTE — CONSULTS
Palliative Care Initial Consult   Attending Physician: Pernell Osorio MD  Referring Provider: Pernell Osorio MD    Reason for Referral: assistance with clarification of goals of care  Family/Support: brothers at bedside  Goals of Care: TBD.  Code Status and Medical Interventions:   Ordered at: 01/16/19 1532     Level Of Support Discussed With:    Patient     Code Status:    CPR     Medical Interventions (Level of Support Prior to Arrest):    Full         HPI:   68 y.o. 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  Recent hospitalization 12/08/2018 through 12/13/2018 related to compression fracture of L3 lumbar vertebral, patient underwent laminectomy as well as vertebral body biopsy and kyphoplasty with radiofrequency ablation.  A vaginal biopsy was completed by Dr. Manas Lugo.  Patient presented to [facility] on 1/16/2019 related to shortness of air, left-sided chest pain radiating across the right chest, and nonproductive cough.  CT of the chest revealed left lower lobe mass with surrounding pneumonia new since previous study.  Mediastinal and left hilar lymphadenopathy is again noted.  There is a new enlarged lymph node in the left axilla that measures 1.3 cm in short axis.  New destructive changes in the inferior glenoid on the left.  This is consistent with metastatic disease.  Large metastatic masses in the adrenal glands are again noted.  These have enlarged. Palliative Care Spoke With: patient and family reports quality of life and functional  "status have declined significantly over the last 2 months.  She is currently living with a sign he was able to provide supportive care since hospitalization. Due to the Palliative Care Topics Discussed: palliative care, goals of care, care options, resuscitation status, [facility] and discharge options we will establish an advance care plan.   Advance Care Planning   Advance Care Planning Discussion: Extensive and complex discussion. Patient is able to verbalize and extensive historical account of her remote and current health status.  We discussed patient's goals of care including palliative radiation and previous discussions she is had with various medical providers.  She verbalizes her understanding of her terminal illness and plans to transition to hospice care after her palliative radiation is complete. Patient reports improvement of pain after medication changes and recent procedure. Currently pain well controlled 2/10. She is currently enrolled in a program offered through Medicare \"care choices\" that provides supportive care during this transition including nurse aide for personal care.  She verbalizes she has always been very independent and having a difficult time with finalizing her affairs including her advance directive.  Patient verbalizes her concerns that if she finalizes plans that she will no longer have any control in decision making and this frightens her.  Based upon this discussion we discussed the role of palliative care as supportive care in complex decision making.  We further explored her care goals including CODE STATUS and medical interventions given her plans to transition to hospice care.  We discussed associated risk of full CPR interventions.  Patient is able to verbalize understanding of her terminal illness but states \"I am fearful this decision may prematurely end my life\".  We explored her fears and the patient verbalized past experiences related to her daughter's brain aneurysm and " "extensive recovery despite medical opinions of remaining in a terminal vegetative status. She verbalized \"I can't help but think...what if they are wrong about me too\".  We explored her terminal illness further and identified her goals for end-of-life care as her wishes to be able to remain at home and focus on quality of life.  We further reviewed medical orders for scope of treatment (MOST) and I have provided her with a decision aid to hopefully empower her to be able to document and delineate her care goals after discharge.  Patient's verbalizes her main concerns at this time is that she feels powerless.  Multiple questions answered and support given.    Health Care Directives/Treatment Preferences  Advance Directive Document on Chart at Time of Consult: no  Pre-existing AND/MOST/POLST Order: No  Advance Directive Status: Patient does not have advance directive  Goals of Care/Treatment Preferences (initial assessment and clinical changes): Goals of care/treatment preferences discussed with patient with decisional capacity and/or surrogate decision maker  Treatment Preferences: full scope of treatment  Code Status Discussed: yes  POLST/MOST Initiated: no    ROS:  General: Negative Anorexia, Drowsiness/Insomnia, Positive Fatigue, Weakness  HEENT: Negative Dry eyes, Excessive Tearing, Xerostomia, Dysphagia  CV: Positive Chest Pain, Negative LE Swelling  Lungs: Positive exertional Dyspnea, Cough  GI/Abd: Last BM  l  Negative Nausea/Vomiting, Abdominal Pain, Constipation/Diarrhea, Appetite   : Negative Urgency, Frequency, Incontinence, Positive Dysuria related to vulvar pain receiving palliative radiation  MS: Negative Muscle Pain, Tripping, Falls, Positive impaired mobility uses walker and assist x 1, back pain related to metastatic disease s/p laminectomy and kyphoplasty with radiofrequency ablation, receiving palliative radiation  Skin: Negative Pruritus, Decubitus Ulcers, Dry Skin, Rash   Neurological: Negative " Delirium, Agitation, Sedation, Neuropathy  Psychology: Positive Anxiety, Depression, Negative Hallucinations        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) 12/8/2018    Added automatically from request for surgery [id number]     Past Surgical History:   Procedure Laterality Date   • APPENDECTOMY     • ECTOPIC PREGNANCY  1975    Ruptured ectopic   • KYPHOPLASTY WITH BIOPSY N/A 12/10/2018    Procedure: KYPHOPLASTY WITH BIOPSY RADIOFREQUENCY TREATMENT TO TUMOR;  Surgeon: Erwin Benitez MD;  Location:  [facility] OR;  Service: Neurosurgery   • LUMBAR LAMINECTOMY N/A 12/10/2018    Procedure: LUMBAR LAMINECTOMY WITHOUT FUSION L3 REMOVAL SPINAL TUMOR;  Surgeon: Erwin Benitez MD;  Location:  [facility] OR;  Service: Neurosurgery   • TOTAL ABDOMINAL HYSTERECTOMY WITH SALPINGO OOPHORECTOMY  1978    DUB, Fibroids, benign   • VENOUS ACCESS DEVICE (PORT) INSERTION N/A 12/11/2018    Procedure: INSERTION VENOUS ACCESS DEVICE;  Surgeon: Ellie Griffith MD;  Location:  [facility] OR;  Service: General   • VULVA BIOPSY Bilateral 12/10/2018    Procedure: VULVA BIOPSY;  Surgeon: Alonzo Lugo MD;  Location:  [facility] OR;  Service: Obstetrics/Gynecology     Social History     Socioeconomic History   • Marital status: Single     Spouse name: Not on file   • Number of children: Not on file   • Years of education: Not on file   • Highest education level: Not on file   Social Needs   • Financial resource strain: Not on file   • Food insecurity - worry: Not on file   • Food insecurity - inability: Not on file   • Transportation needs - medical: Not on file   • Transportation needs - non-medical: Not on file   Occupational History   • Not on file   Tobacco Use   • Smoking status: Current Every Day Smoker     Packs/day: 2.00     Years: 50.00     Pack years: 100.00     Types: Cigarettes   • Smokeless tobacco: Never Used   Substance and Sexual Activity  "  • Alcohol use: No     Frequency: Never   • Drug use: No   • Sexual activity: Defer     Partners: Male   Other Topics Concern   • Not on file   Social History Narrative   • Not on file       No Known Allergies    Current medication reviewed for route, type, dose and frequency and are current per MAR at time of dictation.      Diagnostics: Reviewed    Patient Active Problem List   Diagnosis   • Compression fracture of L3 lumbar vertebra (CMS/HCC)   • 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)   • Encounter for consultation   • Current every day smoker   • Cancer related pain   • Multiple lesions of metastatic malignancy (CMS/HCC)   • Mass of left lung   • BMI 26.0-26.9,adult   • Counseling regarding advanced care planning and goals of care   • Primary squamous cell carcinoma of vulva (CMS/HCC)   • Regional lymph node metastasis present (CMS/HCC)   • Hospital-acquired pneumonia       Physical Exam:    /70 (BP Location: Right arm, Patient Position: Lying)   Pulse 94   Temp 97.5 °F (36.4 °C) (Oral)   Resp 16   Ht 152.4 cm (60\")   Wt 50.3 kg (111 lb)   SpO2 95%   BMI 21.68 kg/m²     General Appearance: alert, appears stated age and cooperative  Head: normocephalic, without obvious abnormality and atraumatic  Eyes: lids and lashes normal, conjunctivae and sclerae normal, no icterus, no pallor, corneas clear and PERRLA  Throat: oral mucosa moist  Lungs: diminished bilateral bases  Heart: regular rhythm & normal rate and normal S1, S2  Abdomen: normal bowel sounds  Extremities: moves extremities well, no edema, no cyanosis and no redness  Skin: documented wounds to right labia and coccyx  Psych: normal      Patient status: Disease state: Controlled with current treatments.  Functional status: Palliative Performance Scale Score: Performance 40% based on the following measures: Ambulation: Mainly in bed, Activity and Evidence of Disease: Unable to do any work, extensive evidence of disease, Self-Care: " "Mainly assistance required,  Intake: Normal or reduced, LOC: Full, drowsy or confusion   ECOG Status(3) Capable of limited self-care, confined to bed or chair > 50% of waking hours.  Nutritional status: albumin 2.60. Body mass index is 21.68 kg/m².  Screening Status/Interventions  Psychosocial Needs: neg  Spiritual Needs: pos  Spiritual Needs Intervened: yes  Goals of Care/ACP: pos  Goals of Care/ACP Intervened: yes   Palliative Care Acuity  Psychosocial Acuity: normal complexity  Spiritual Acuity: normal complexity  Family support: The patient receives support from her children..  POA/Healthcare surrogate-no advance directive on file    Impression/Problem List:    1. Neuroendocrine large cell carcinoma, left lower lobe, paraspinal mass medial left lung  2. 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 Pneumonia  5. Anemia of chronic disease-likely of malignancy  6. History of coronary artery disease  7. History of tobacco abuse  8. Anxiety      Recommendations/Plan:  1. plan: Goals of care include CPR/Full interventions.   -Remains guarded in plans to transition care, \"fear of giving up control\". Would benefit from continued conversations that empower personal decision making for end of life care and advance care planning.  -Plans to complete palliative radiation and likely transition to hospice. Currently enrolled in Care Choices for transition. This will likely be integral in patient's transition.  Outcomes  Code Status After Consult: full  Number of Family Meetings: 1  Number of Days Until Referral Purpose Met/Improved: 2  Other Outcomes: code status clarified    2. Pain  -well controlled 2/10. In patient with bone metastasis would benefit from NSAID usage. Recommend start Naproxen 500 mg twice daily as this would likely result in less opioid usage and more consistent pain control.  Received fentanyl transdermal " patch 25 µg, oxycodone/acetaminophen 10\325 mg 2 tabs ×4 doses, 170 mg oral morphine equivalent over the last 24 hours.  If utilization is not improved with adding NSAID may consider increasing Fentanyl transdermal patch to 50 µg every 72 hours and continuing oxycodone\acetaminophen 10\325 mg every 4 hours as needed for breakthrough pain.  Recommend starting aggressive bowel regimen like MiraLAX daily in patient on chronic opioid therapy.    3.  Anxiety  -Recommend start Lexapro 5 mg daily.      Thank you for this consult and allowing us to participate in patient's plan of care. Palliative Care Team will continue to follow patient.     Time spent: 120 minutes spent reviewing medical and medication records, assessing and examining patient, discussing with family, answering questions, providing some guidance about a plan and documentation of care, and coordinating care with other healthcare members, with > 50% time spent face to face.   100 minutes spent on advance care planning.    Arleen Jean, APRN  1/17/2019

## 2019-01-17 NOTE — PROGRESS NOTES
Called to bedside for hand redness after Vancomycin  Slight hand redness, no hives.  No throat itching/swelling/closing sensation.  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

## 2019-01-18 LAB
ALBUMIN SERPL-MCNC: 2.3 G/DL (ref 3.5–5)
ALBUMIN/GLOB SERPL: 0.7 G/DL (ref 1.1–2.5)
ALP SERPL-CCNC: 103 U/L (ref 24–120)
ALT SERPL W P-5'-P-CCNC: 17 U/L (ref 0–54)
ANION GAP SERPL CALCULATED.3IONS-SCNC: 10 MMOL/L (ref 4–13)
AST SERPL-CCNC: 17 U/L (ref 7–45)
BASOPHILS # BLD AUTO: 0.03 10*3/MM3 (ref 0–0.2)
BASOPHILS NFR BLD AUTO: 0.2 % (ref 0–2)
BILIRUB SERPL-MCNC: 0.2 MG/DL (ref 0.1–1)
BUN BLD-MCNC: 15 MG/DL (ref 5–21)
BUN/CREAT SERPL: 40.5 (ref 7–25)
CALCIUM SPEC-SCNC: 7.7 MG/DL (ref 8.4–10.4)
CHLORIDE SERPL-SCNC: 100 MMOL/L (ref 98–110)
CO2 SERPL-SCNC: 27 MMOL/L (ref 24–31)
CREAT BLD-MCNC: 0.37 MG/DL (ref 0.5–1.4)
DEPRECATED RDW RBC AUTO: 47.9 FL (ref 40–54)
EOSINOPHIL # BLD AUTO: 0 10*3/MM3 (ref 0–0.7)
EOSINOPHIL NFR BLD AUTO: 0 % (ref 0–4)
ERYTHROCYTE [DISTWIDTH] IN BLOOD BY AUTOMATED COUNT: 15.2 % (ref 12–15)
GFR SERPL CREATININE-BSD FRML MDRD: >150 ML/MIN/1.73
GLOBULIN UR ELPH-MCNC: 3.4 GM/DL
GLUCOSE BLD-MCNC: 123 MG/DL (ref 70–100)
HCT VFR BLD AUTO: 25.8 % (ref 37–47)
HGB BLD-MCNC: 8.5 G/DL (ref 12–16)
IMM GRANULOCYTES # BLD AUTO: 0.34 10*3/MM3 (ref 0–0.03)
IMM GRANULOCYTES NFR BLD AUTO: 2.2 % (ref 0–5)
LYMPHOCYTES # BLD AUTO: 0.9 10*3/MM3 (ref 0.72–4.86)
LYMPHOCYTES NFR BLD AUTO: 5.9 % (ref 15–45)
MCH RBC QN AUTO: 28.6 PG (ref 28–32)
MCHC RBC AUTO-ENTMCNC: 32.9 G/DL (ref 33–36)
MCV RBC AUTO: 86.9 FL (ref 82–98)
MONOCYTES # BLD AUTO: 0.94 10*3/MM3 (ref 0.19–1.3)
MONOCYTES NFR BLD AUTO: 6.2 % (ref 4–12)
NEUTROPHILS # BLD AUTO: 13.03 10*3/MM3 (ref 1.87–8.4)
NEUTROPHILS NFR BLD AUTO: 85.5 % (ref 39–78)
NRBC BLD AUTO-RTO: 0.1 /100 WBC (ref 0–0)
PLATELET # BLD AUTO: 532 10*3/MM3 (ref 130–400)
PMV BLD AUTO: 9.5 FL (ref 6–12)
POTASSIUM BLD-SCNC: 3.8 MMOL/L (ref 3.5–5.3)
PROT SERPL-MCNC: 5.7 G/DL (ref 6.3–8.7)
RBC # BLD AUTO: 2.97 10*6/MM3 (ref 4.2–5.4)
SODIUM BLD-SCNC: 137 MMOL/L (ref 135–145)
VANCOMYCIN TROUGH SERPL-MCNC: 11.18 MCG/ML (ref 10–20)
WBC NRBC COR # BLD: 15.24 10*3/MM3 (ref 4.8–10.8)

## 2019-01-18 PROCEDURE — 97161 PT EVAL LOW COMPLEX 20 MIN: CPT | Performed by: PHYSICAL THERAPIST

## 2019-01-18 PROCEDURE — 25010000002 CEFEPIME PER 500 MG: Performed by: FAMILY MEDICINE

## 2019-01-18 PROCEDURE — 87040 BLOOD CULTURE FOR BACTERIA: CPT | Performed by: NURSE PRACTITIONER

## 2019-01-18 PROCEDURE — 80202 ASSAY OF VANCOMYCIN: CPT | Performed by: FAMILY MEDICINE

## 2019-01-18 PROCEDURE — 85025 COMPLETE CBC W/AUTO DIFF WBC: CPT | Performed by: FAMILY MEDICINE

## 2019-01-18 PROCEDURE — 25010000002 VANCOMYCIN 10 G RECONSTITUTED SOLUTION: Performed by: FAMILY MEDICINE

## 2019-01-18 PROCEDURE — 97167 OT EVAL HIGH COMPLEX 60 MIN: CPT | Performed by: OCCUPATIONAL THERAPIST

## 2019-01-18 PROCEDURE — 77412 RADIATION TX DELIVERY LVL 3: CPT | Performed by: RADIOLOGY

## 2019-01-18 PROCEDURE — 25010000002 ENOXAPARIN PER 10 MG: Performed by: NURSE PRACTITIONER

## 2019-01-18 PROCEDURE — 99497 ADVNCD CARE PLAN 30 MIN: CPT | Performed by: CLINICAL NURSE SPECIALIST

## 2019-01-18 PROCEDURE — 80053 COMPREHEN METABOLIC PANEL: CPT | Performed by: FAMILY MEDICINE

## 2019-01-18 PROCEDURE — 99233 SBSQ HOSP IP/OBS HIGH 50: CPT | Performed by: CLINICAL NURSE SPECIALIST

## 2019-01-18 PROCEDURE — 99498 ADVNCD CARE PLAN ADDL 30 MIN: CPT | Performed by: CLINICAL NURSE SPECIALIST

## 2019-01-18 RX ADMIN — CEFEPIME 2 G: 2 INJECTION, POWDER, FOR SOLUTION INTRAVENOUS at 16:33

## 2019-01-18 RX ADMIN — ENOXAPARIN SODIUM 30 MG: 30 INJECTION SUBCUTANEOUS at 08:50

## 2019-01-18 RX ADMIN — GUAIFENESIN 1200 MG: 600 TABLET, EXTENDED RELEASE ORAL at 08:50

## 2019-01-18 RX ADMIN — OXYCODONE HYDROCHLORIDE AND ACETAMINOPHEN 2 TABLET: 10; 325 TABLET ORAL at 22:42

## 2019-01-18 RX ADMIN — VANCOMYCIN HYDROCHLORIDE 750 MG: 10 INJECTION, POWDER, LYOPHILIZED, FOR SOLUTION INTRAVENOUS at 05:34

## 2019-01-18 RX ADMIN — OXYCODONE HYDROCHLORIDE AND ACETAMINOPHEN 2 TABLET: 10; 325 TABLET ORAL at 04:30

## 2019-01-18 RX ADMIN — ALPRAZOLAM 0.5 MG: 0.5 TABLET ORAL at 22:42

## 2019-01-18 RX ADMIN — GUAIFENESIN 1200 MG: 600 TABLET, EXTENDED RELEASE ORAL at 20:26

## 2019-01-18 RX ADMIN — Medication 81 MG: at 08:50

## 2019-01-18 RX ADMIN — POLYETHYLENE GLYCOL 3350 17 G: 17 POWDER, FOR SOLUTION ORAL at 08:50

## 2019-01-18 RX ADMIN — OXYCODONE HYDROCHLORIDE AND ACETAMINOPHEN 2 TABLET: 10; 325 TABLET ORAL at 16:33

## 2019-01-18 RX ADMIN — OXYCODONE HYDROCHLORIDE AND ACETAMINOPHEN 2 TABLET: 10; 325 TABLET ORAL at 10:27

## 2019-01-18 RX ADMIN — MEGESTROL ACETATE 400 MG: 40 SUSPENSION ORAL at 08:50

## 2019-01-18 RX ADMIN — PANTOPRAZOLE SODIUM 20 MG: 20 TABLET, DELAYED RELEASE ORAL at 08:50

## 2019-01-18 RX ADMIN — CEFEPIME HYDROCHLORIDE 1 G: 1 INJECTION, POWDER, FOR SOLUTION INTRAMUSCULAR; INTRAVENOUS at 04:25

## 2019-01-18 NOTE — PLAN OF CARE
Problem: Patient Care Overview  Goal: Plan of Care Review  Outcome: Ongoing (interventions implemented as appropriate)   01/18/19 1102   Coping/Psychosocial   Plan of Care Reviewed With patient;family   Plan of Care Review   Progress no change  (eval day)   OTHER   Outcome Summary PT evaluation completed. Pt alert and oriented x4. Pt performed bed mobility with sup. Pt unable to sit because of cancer location and pain level, therefore we were unable to perform any balance activities or attempts to stand. Pt with significantly decreased strength in BLE. Pt wants to have therapy while in hospital and would benefit from strengthening and endurance activities. Pt plans to d/c home with her brother at this time pending progress and recommmend home health if desired to help improve safety and functional mobility. Pt will require max assist for most activities if d/c home, but it does seem like this may be best for her at this point in her care.           right knee pain/ right shoulder pain

## 2019-01-18 NOTE — PLAN OF CARE
Problem: Patient Care Overview  Goal: Plan of Care Review  Outcome: Ongoing (interventions implemented as appropriate)   01/18/19 6282   Coping/Psychosocial   Plan of Care Reviewed With patient   Plan of Care Review   Progress improving   OTHER   Outcome Summary OT Willam completed. Pt reports she plans to d/c to brothers home with home health and begin hospice care when she finishes radiation. 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 She is very weak and will require assist of 2 to get out of bed and attempt standing. Min A for UB ADL. Mod to Max A for LB ADL. Pt reports owning all AE that her insurance coverage will provide. OT will continue working with pt focusing on self care techniques and ADL transfers.

## 2019-01-18 NOTE — PROGRESS NOTES
Palliative Care Daily Progress Note     goals of care/advanced care planning, support for patient/family and hospice referral/discussion    Code Status:   Code Status and Medical Interventions:   Ordered at: 01/16/19 9705     Level Of Support Discussed With:    Patient     Code Status:    CPR     Medical Interventions (Level of Support Prior to Arrest):    Full      Advanced Directives: Advance Directive Status: Patient does not have advance directive   Goals of Care: Ongoing.     S: Medical record reviewed. Events noted.  Patient sitting in bed eating lunch, reports she is feeling stronger today.  Her brother and sister at bedside. Due to the Palliative Care Topics Discussed: [facility] and discharge options we will establish an advance care plan.   Advance Care Planning   Advance Care Planning Discussion: Extensive family conference related to patient care goals after discharge. Discussion in the presence of patient, sister, son, and brother. All are able to verbalize an understanding the patient has a terminal illness.  Patient reports she has now elected to move in with her brother after discharge. Son verbalizes his concerns about patient's decision making. We discussed goals after discharge to include continuing palliative radiation as recommended for total of 13 treatments and then transition to hospice care.  All parties agree this continues to be the plan.  All are encouraged to ask questions with regard to hospice role in end-of-life care.  All given the opportunity to voice their concerns. Questions answered and support given.      ROS:   General: Negative Anorexia, Drowsiness/Insomnia, Positive Fatigue, Weakness  HEENT: Negative Dry eyes, Excessive Tearing, Xerostomia, Dysphagia  CV: Positive Chest Pain, Negative LE Swelling  Lungs: Positive exertional Dyspnea, Cough  GI/Abd: Last BM  l  Negative Nausea/Vomiting, Abdominal Pain, Constipation/Diarrhea, Appetite              : Negative Urgency, Frequency,  "Incontinence, Positive Dysuria related to vulvar pain receiving palliative radiation  MS: Negative Muscle Pain, Tripping, Falls, Positive impaired mobility uses walker and assist x 1, back pain related to metastatic disease s/p laminectomy and kyphoplasty with radiofrequency ablation, receiving palliative radiation  Skin: Negative Pruritus, Decubitus Ulcers, Dry Skin, Rash    Neurological: Negative Delirium, Agitation, Sedation, Neuropathy  Psychology: Positive Anxiety, Depression, Negative Hallucinations               O:       Intake/Output Summary (Last 24 hours) at 1/18/2019 1608  Last data filed at 1/18/2019 1500  Gross per 24 hour   Intake 1080 ml   Output 1250 ml   Net -170 ml       Diagnostics: Reviewed    Patient Active Problem List   Diagnosis   • Compression fracture of L3 lumbar vertebra (CMS/HCC)   • 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)   • Encounter for consultation   • Current every day smoker   • Cancer related pain   • Multiple lesions of metastatic malignancy (CMS/HCC)   • Mass of left lung   • BMI 26.0-26.9,adult   • Counseling regarding advanced care planning and goals of care   • Primary squamous cell carcinoma of vulva (CMS/HCC)   • Regional lymph node metastasis present (CMS/HCC)   • Hospital-acquired pneumonia       Physical Exam:    /67 (BP Location: Right arm, Patient Position: Lying)   Pulse 86   Temp 98.2 °F (36.8 °C) (Temporal)   Resp 18   Ht 152.4 cm (60\")   Wt 51 kg (112 lb 6.4 oz)   SpO2 95%   BMI 21.95 kg/m²   General Appearance: alert, appears stated age and cooperative  Head: normocephalic, without obvious abnormality and atraumatic  Eyes: lids and lashes normal, conjunctivae and sclerae normal, no icterus, no pallor, corneas clear and PERRLA  Throat: oral mucosa moist  Lungs: diminished bilateral bases  Heart: regular rhythm & normal rate and normal S1, S2  Abdomen: normal bowel sounds  Extremities: moves extremities well, no edema, no cyanosis and no " "redness  Skin: documented wounds to right labia and coccyx  Psych: normal        Patient status: Disease state: Controlled with current treatments.  Functional status: Palliative Performance Scale Score: Performance 40% based on the following measures: Ambulation: Mainly in bed, Activity and Evidence of Disease: Unable to do any work, extensive evidence of disease, Self-Care: Mainly assistance required,  Intake: Normal or reduced, LOC: Full, drowsy or confusion   ECOG Status(3) Capable of limited self-care, confined to bed or chair > 50% of waking hours.  Nutritional status: albumin 2.60. Body mass index is 21.68 kg/m².  Screening Status/Interventions  Psychosocial Needs: neg  Spiritual Needs: pos  Spiritual Needs Intervened: yes  Goals of Care/ACP: pos  Goals of Care/ACP Intervened: yes   Palliative Care Acuity  Psychosocial Acuity: normal complexity  Spiritual Acuity: normal complexity  Family support: The patient receives support from her children..  POA/Healthcare surrogate-no advance directive on file     Impression/Problem List:     1. Neuroendocrine large cell carcinoma, left lower lobe, paraspinal mass medial left lung  2. 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 Pneumonia  5. Anemia of chronic disease-likely of malignancy  6. History of coronary artery disease  7. History of tobacco abuse  8. Anxiety        Recommendations/Plan:  1. plan: Goals of care include CPR/Full interventions.   -Remains guarded in plans to transition care, \"fear of giving up control\". Would benefit from continued conversations that empower personal decision making for end of life care and advance care planning.  -Plans to complete palliative radiation and transition to hospice. Currently enrolled in Care Choices for transition. This will likely be integral in patient's transition.  -Now plans to discharge home with brother. " Informed family to contact [employer] to arrange bed to be moved.     2. Pain  -well controlled 2/10. In patient with bone metastasis would benefit from NSAID usage. Recommend start Naproxen 500 mg twice daily as this would likely result in less opioid usage and more consistent pain control.  Received fentanyl transdermal patch 25 µg, oxycodone/acetaminophen 10\325 mg 2 tabs ×4 doses, 170 mg oral morphine equivalent over the last 24 hours.  If utilization is not improved with adding NSAID may consider increasing Fentanyl transdermal patch to 50 µg every 72 hours and continuing oxycodone\acetaminophen 10\325 mg every 4 hours as needed for breakthrough pain.  Recommend starting aggressive bowel regimen like MiraLAX daily in patient on chronic opioid therapy.     3.  Anxiety  -Recommend start Lexapro 5 mg daily.      Thank you for allowing us to participate in patient's plan of care. Palliative Care Team will continue to follow patient.     Time spent: 105 minutes spent reviewing medical and medication records, assessing and examining patient, discussing with family, answering questions, providing some guidance about a plan and documentation of care, and coordinating care with other healthcare members, with > 50% time spent face to face.   90 minutes spent on advance care planning.    Arleen Jean, APRN  1/18/2019

## 2019-01-18 NOTE — DISCHARGE PLACEMENT REQUEST
"Shilpa Reina [phone]  Trish Carcamo (68 y.o. Female)     Date of Birth Social Security Number Address Home Phone MRN    [id number]  [id number] [place] [id number] [phone] [id number]    Amish Marital Status          Non-Lutheran Single       Admission Date Admission Type Admitting Provider Attending Provider Department, Room/Bed    1/16/19 Emergency Pernell Osorio MD Moore, Jonathan Scott, MD [address], 494/1    Discharge Date Discharge Disposition Discharge Destination                       Attending Provider:  Pernell Osorio MD    Allergies:  No Known Allergies    Isolation:  None   Infection:  None   Code Status:  CPR    Ht:  152.4 cm (60\")   Wt:  51 kg (112 lb 6.4 oz)    Admission Cmt:  None   Principal Problem:  None                Active Insurance as of 1/16/2019     Primary Coverage     Payor Plan Insurance Group Employer/Plan Group    MEDICARE MEDICARE A & B      Payor Plan Address Payor Plan Phone Number Payor Plan Fax Number Effective Dates    [address] [phone]  9/1/2015 - None Entered    [facility] [id number]       Subscriber Name Subscriber Birth Date Member ID       TRISH CARCAMO 1950 [id number]           Secondary Coverage     Payor Plan Insurance Group Employer/Plan Group    HUMANA MEDICAID HU[employer]NA [employer] [id number]     Payor Plan Address Payor Plan Phone Number Payor Plan Fax Number Effective Dates    PO  [phone]  11/1/2015 - None Entered    [facility] [id number]       Subscriber Name Subscriber Birth Date Member ID       TRISH CARCAMO 1950 [id number]                 Emergency Contacts      (Rel.) Home Phone Work Phone Mobile Phone    Lele Carcamo (Son) [phone] -- --    Garrett Carcamo -- -- [phone]    Krystle Susana (Daughter) [phone] -- --               History & Physical      Pernell Osorio MD at 1/16/2019  2:14 PM              [facility] Medicine " Services  HISTORY AND PHYSICAL    Date of Admission: 1/16/2019  Primary Care Physician: Provider, No Known    Subjective     Chief Complaint: SOA    History of Present Illness  Mrs. Rios is a very pleasant 68 year old lady with a history of metastatic large cell neuroendocrine carcinoma.  She also has squamous cell carcinoma of the vagina.  She was previously hospitalized in December and underwent kyphoplasty with Dr. Benitez.      Since discharge, she has been doing ok.  She is undergoing palliative radiation to help with her pain.  She plans to pursue hospice after completing palliative radiation.      The last several days, she has had left sided chest pain and shortness of breath.  She herself thought this was likely pneumonia.  She has had cough and congestion.  She says the cough has been slightly productive with yellow sputum.  She denies fevers or chills.          Review of Systems   Constitutional: Positive for fatigue. Negative for fever.   HENT: Negative for congestion and ear pain.    Eyes: Negative for pain and visual disturbance.   Respiratory: Positive for cough and shortness of breath. Negative for wheezing.    Cardiovascular: Negative for chest pain and palpitations.   Gastrointestinal: Negative for diarrhea, nausea and vomiting.   Endocrine: Negative for heat intolerance.   Genitourinary: Negative for dysuria and frequency.   Musculoskeletal: Negative for arthralgias and back pain.   Skin: Negative for rash and wound.   Neurological: Negative for dizziness and light-headedness.   Psychiatric/Behavioral: Negative for confusion. The patient is not nervous/anxious.    All other systems reviewed and are negative.       Otherwise complete ROS reviewed and negative except as mentioned in the HPI.    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) 12/8/2018    Added automatically from  request for surgery [id number]     Past Surgical History:  Past Surgical History:   Procedure Laterality Date   • APPENDECTOMY     • ECTOPIC PREGNANCY  1975    Ruptured ectopic   • KYPHOPLASTY WITH BIOPSY N/A 12/10/2018    Procedure: KYPHOPLASTY WITH BIOPSY RADIOFREQUENCY TREATMENT TO TUMOR;  Surgeon: Erwin Benitez MD;  Location:  [facility] OR;  Service: Neurosurgery   • LUMBAR LAMINECTOMY N/A 12/10/2018    Procedure: LUMBAR LAMINECTOMY WITHOUT FUSION L3 REMOVAL SPINAL TUMOR;  Surgeon: Erwin Benitez MD;  Location:  [facility] OR;  Service: Neurosurgery   • TOTAL ABDOMINAL HYSTERECTOMY WITH SALPINGO OOPHORECTOMY  1978    DUB, Fibroids, benign   • VENOUS ACCESS DEVICE (PORT) INSERTION N/A 12/11/2018    Procedure: INSERTION VENOUS ACCESS DEVICE;  Surgeon: Ellie Griffith MD;  Location:  [facility] OR;  Service: General   • VULVA BIOPSY Bilateral 12/10/2018    Procedure: VULVA BIOPSY;  Surgeon: Alonzo Lugo MD;  Location:  [facility] OR;  Service: Obstetrics/Gynecology     Social History:  reports that she has been smoking cigarettes.  She has a 100.00 pack-year smoking history. she has never used smokeless tobacco. She reports that she does not drink alcohol or use drugs.    Family History: Cancer, heart disease, diabetes    Allergies:  No Known Allergies  Medications:  Prior to Admission medications    Medication Sig Start Date End Date Taking? Authorizing Provider   ALPRAZolam (XANAX) 0.5 MG tablet Take 0.5 mg by mouth 2 (Two) Times a Day As Needed for Anxiety.   Yes Jennifer Montiel MD   fentaNYL (DURAGESIC) 25 MCG/HR patch Place 1 patch on the skin as directed by provider Every 72 (Seventy-Two) Hours.   Yes Jennifer Montiel MD   oxyCODONE-acetaminophen (PERCOCET)  MG per tablet Take 2 tablets by mouth Every 6 (Six) Hours As Needed for Moderate Pain . 12/19/18  Yes Erwin Benitez MD   pantoprazole (PROTONIX) 20 MG EC tablet Take 1 tablet by mouth Daily. 12/13/18  Yes Pernell Aguiar APRN   aspirin 81  "MG chewable tablet Chew 81 mg Daily.    Provider, MD Jennifer   cyclobenzaprine (FLEXERIL) 10 MG tablet Take 1 tablet by mouth Every 8 (Eight) Hours As Needed for Muscle Spasms. 12/1/18   Agata Molina APRN   ibuprofen (ADVIL,MOTRIN) 600 MG tablet Take 1 tablet by mouth Every 8 (Eight) Hours As Needed for Moderate Pain . 12/1/18   Agata Molina APRN   predniSONE (DELTASONE) 20 MG tablet Take 1 tablet by mouth 2 (Two) Times a Day. 12/1/18   Agata Molina APRN     Objective     Vital Signs: /64   Pulse 99   Temp 97.8 °F (36.6 °C)   Resp 18   Ht 152.4 cm (60\")   Wt 50.3 kg (111 lb)   SpO2 93%   BMI 21.68 kg/m²    Physical Exam   Constitutional: She is oriented to person, place, and time. She appears well-developed and well-nourished.   HENT:   Head: Normocephalic and atraumatic.   Right Ear: External ear normal.   Left Ear: External ear normal.   Nose: Nose normal.   Mouth/Throat: Oropharynx is clear and moist.   Eyes: Conjunctivae and EOM are normal.   Neck: Normal range of motion. Neck supple.   Cardiovascular: Normal rate, regular rhythm and normal heart sounds.   Pulmonary/Chest: Effort normal. She has decreased breath sounds. She has rhonchi.   Abdominal: Soft. Bowel sounds are normal. She exhibits no distension. There is no tenderness.   Musculoskeletal: Normal range of motion.   Neurological: She is alert and oriented to person, place, and time.   Skin: Skin is warm and dry.   Psychiatric: She has a normal mood and affect. Her speech is normal and behavior is normal. Cognition and memory are normal.           Results Reviewed:  Lab Results (last 24 hours)     Procedure Component Value Units Date/Time    Troponin [[id number]] Collected:  01/16/19 1359    Specimen:  Blood Updated:  01/16/19 1408    [place] Draw [[id number]] Collected:  01/16/19 1033    Specimen:  Blood Updated:  01/16/19 1145    Narrative:       The following orders were created for panel order [place] " Draw.  Procedure                               Abnormality         Status                     ---------                               -----------         ------                     Light Blue Top[[id number]]                                   Final result               Green Top (Gel)[[id number]]                                  Final result               Lavender Top[[id number]]                                     Final result               Red Top[[id number]]                                          Final result                 Please view results for these tests on the individual orders.    Green Top (Gel) [[id number]] Collected:  01/16/19 1033    Specimen:  Blood Updated:  01/16/19 1145     Extra Tube Hold for add-ons.     Comment: Auto resulted.       Light Blue Top [[id number]] Collected:  01/16/19 1033    Specimen:  Blood Updated:  01/16/19 1145     Extra Tube hold for add-on     Comment: Auto resulted       Lavender Top [[id number]] Collected:  01/16/19 1033    Specimen:  Blood Updated:  01/16/19 1145     Extra Tube hold for add-on     Comment: Auto resulted       Red Top [[id number]] Collected:  01/16/19 1033    Specimen:  Blood Updated:  01/16/19 1145     Extra Tube Hold for add-ons.     Comment: Auto resulted.       Troponin [[id number]]  (Normal) Collected:  01/16/19 1033    Specimen:  Blood Updated:  01/16/19 1110     Troponin I <0.012 ng/mL     BNP [[id number]]  (Abnormal) Collected:  01/16/19 1033    Specimen:  Blood Updated:  01/16/19 1110     proBNP 1,230.0 pg/mL     Comprehensive Metabolic Panel [[id number]]  (Abnormal) Collected:  01/16/19 1033    Specimen:  Blood Updated:  01/16/19 1058     Glucose 98 mg/dL      BUN 17 mg/dL      Creatinine 0.29 mg/dL      Sodium 129 mmol/L      Potassium 3.9 mmol/L      Chloride 89 mmol/L      CO2 31.0 mmol/L      Calcium 8.0 mg/dL      Total Protein 7.2 g/dL      Albumin 3.00 g/dL      ALT (SGPT) 23 U/L      AST (SGOT) 52 U/L      Alkaline Phosphatase 139 U/L      Total  Bilirubin 0.5 mg/dL      eGFR Non African Amer >150 mL/min/1.73      Globulin 4.2 gm/dL      A/G Ratio 0.7 g/dL      BUN/Creatinine Ratio 58.6     Anion Gap 9.0 mmol/L     Phosphorus [[id number]]  (Normal) Collected:  01/16/19 1033    Specimen:  Blood Updated:  01/16/19 1058     Phosphorus 4.4 mg/dL     Magnesium [[id number]]  (Normal) Collected:  01/16/19 1033    Specimen:  Blood Updated:  01/16/19 1058     Magnesium 1.7 mg/dL     Protime-INR [[id number]]  (Abnormal) Collected:  01/16/19 1033    Specimen:  Blood Updated:  01/16/19 1058     Protime 15.0 Seconds      INR 1.14    aPTT [[id number]]  (Abnormal) Collected:  01/16/19 1033    Specimen:  Blood Updated:  01/16/19 1058     PTT 46.2 seconds     CBC & Differential [[id number]] Collected:  01/16/19 1033    Specimen:  Blood Updated:  01/16/19 1047    Narrative:       The following orders were created for panel order CBC & Differential.  Procedure                               Abnormality         Status                     ---------                               -----------         ------                     CBC Auto Differential[[id number]]        Abnormal            Final result                 Please view results for these tests on the individual orders.    CBC Auto Differential [[id number]]  (Abnormal) Collected:  01/16/19 1033    Specimen:  Blood Updated:  01/16/19 1047     WBC 18.73 10*3/mm3      RBC 3.31 10*6/mm3      Hemoglobin 9.3 g/dL      Hematocrit 27.5 %      MCV 83.1 fL      MCH 28.1 pg      MCHC 33.8 g/dL      RDW 14.8 %      RDW-SD 44.9 fl      MPV 9.5 fL      Platelets 490 10*3/mm3      Neutrophil % 84.8 %      Lymphocyte % 6.1 %      Monocyte % 6.8 %      Eosinophil % 0.0 %      Basophil % 0.4 %      Immature Grans % 1.9 %      Neutrophils, Absolute 15.86 10*3/mm3      Lymphocytes, Absolute 1.15 10*3/mm3      Monocytes, Absolute 1.28 10*3/mm3      Eosinophils, Absolute 0.00 10*3/mm3      Basophils, Absolute 0.08 10*3/mm3      Immature Grans, Absolute  0.36 10*3/mm3      nRBC 0.0 /100 WBC         Imaging Results (last 24 hours)     Procedure Component Value Units Date/Time    CT Angiogram Chest With Contrast [[id number]] Collected:  01/16/19 1236     Updated:  01/16/19 1250    Narrative:       CT ANGIOGRAM CHEST W CONTRAST- 1/16/2019 12:20 PM CST      HISTORY: Shortness of air and chest pain      COMPARISON: Radiation planning CT 12/31/2018 and CT of the chest  12/8/2018.      DOSE LENGTH PRODUCT: 405 mGy cm. Automated exposure control was also  utilized to decrease patient radiation dose.     TECHNIQUE: Helical tomographic images of the chest were obtained after  the administration of intravenous contrast following angiogram protocol.  Additionally, 3D and multiplanar reformatted images were provided.        FINDINGS:    Pulmonary arteries: There is no evidence of pulmonary embolus. The  pulmonary arteries are slightly enlarged, consistent with pulmonary  arterial hypertension     Aorta and great vessels: There is atherosclerosis in the aorta without  aneurysm or dissection. There is atherosclerosis in the great vessels.  The patient's port extends into the SVC.     Visualized neck base: The imaged portion of the base of the neck appears  unremarkable.      Lungs: There is a large mass in the LEFT lower lobe. Surrounding  consolidation is present. Areas of cavitation are seen within the mass.  These are new since the previous study. Moderate emphysematous changes  are present in the lungs.       Heart: The heart is normal in size. Coronary artery calcifications are  visualized. Trace pericardial fluid is present.      Mediastinum and lymph nodes: Mediastinal, LEFT hilar, and subcarinal  lymphadenopathy is unchanged. There is a new LEFT axillary lymph node  that measures 1.3 cm in diameter.      Skeletal and soft tissues: A metastatic lesion is seen in the inferior  glenoid process of the LEFT scapula. No other destructive lesions are  seen in the visualized  bones.     Upper abdomen: Enlarging adrenal metastases are seen bilaterally. These  measure up to 6.7 cm.       Impression:       1. No evidence of pulmonary embolus.  2. LEFT lower lobe lung mass with surrounding pneumonia. The pneumonia  is new since the previous study.  3. Mediastinal and LEFT hilar lymphadenopathy is again noted.  4. There is a new enlarged lymph node in the LEFT axilla that measures  1.3 cm in short axis.  5. New destructive changes in the inferior glenoid on the LEFT. This is  consistent with metastatic disease.  6. Large metastatic masses in the adrenal glands are again noted. These  have enlarged.        This report was finalized on 01/16/2019 12:47 by Dr. Yousif Herrera MD.    XR Chest 1 View [[id number]] Collected:  01/16/19 1118     Updated:  01/16/19 1123    Narrative:       History:  68-year-old chest pain.     Reference:  Chest radiograph December 11, 2018. CT chest December 2018.     Findings:  Frontal chest radiograph performed.     Extensive opacity in the left lower lobe base corresponds with known  tumor. New hazy opacity is in the mid left lower lobe and lingula. Lungs  are emphysematous. Questionable small left pleural effusion. No  pneumothorax. Left subclavian Port-A-Cath, tip in SVC.          Impression:          1. New hazy opacities in the left upper lobe, questionable developing  pneumonia.  2. Known extensive left lower lobe tumor.  This report was finalized on 01/16/2019 11:20 by Dr Riley Acevedo, .        I have personally reviewed and interpreted the radiology studies and ECG obtained at time of admission.     Assessment / Plan     Assessment:   Active Hospital Problems    Diagnosis   • Hospital-acquired pneumonia       1.  Simple Sepsis  -IV abx  -Cultures    2.  HAP  -IV abx  -Nebs  -mucinex    3.  Wide-spread metastatic cancer with multiple cancer types  -Consult her oncologist    4.  Tobacco abuse  -Recently quit    5.  HTN  -home meds         Code Status: full     I  discussed the patient's findings and my recommendations with the patient, patient's family    Estimated length of stay 2-3 days    Pernell Osorio MD   01/16/19   2:14 PM              Electronically signed by Pernell Osorio MD at 1/16/2019  7:11 PM

## 2019-01-18 NOTE — PROGRESS NOTES
[facility] Medicine Services  INPATIENT PROGRESS NOTE    Length of Stay: 2  Date of Admission: 1/16/2019  Primary Care Physician: Provider, No Known    Subjective   Chief Complaint: follow up pneumonia  HPI   Sitting up in bed eating breakfast.  Son and brother in room.  She just returned from radiation.  No oxygen in use.  No sputum production.  Denies fever or chills.  Denies nausea, vomiting or abdominal pain.  Appetite some improved.  Plans to continue radiation and then return to hospice after discharge.  Has been receiving care choice with [facility].  Palliative care saw yesterday.    Review of Systems   Constitutional: Positive for fatigue.   HENT: Negative for congestion and trouble swallowing.    Eyes: Negative for photophobia and visual disturbance.   Respiratory: Positive for cough and shortness of breath. Negative for wheezing.    Cardiovascular: Negative for chest pain, palpitations and leg swelling.   Gastrointestinal: Negative for constipation, diarrhea, nausea and vomiting.   Endocrine: Negative for cold intolerance, heat intolerance and polyuria.   Genitourinary: Negative for dysuria and urgency.   Musculoskeletal: Positive for gait problem.   Skin: Negative for wound.   Allergic/Immunologic: Negative for immunocompromised state.   Neurological: Positive for weakness.   Hematological: Negative for adenopathy. Does not bruise/bleed easily.   Psychiatric/Behavioral: Negative for agitation, behavioral problems and confusion.     All pertinent negatives and positives are as above. All other systems have been reviewed and are negative unless otherwise stated.     Objective    Temp:  [97.4 °F (36.3 °C)-97.9 °F (36.6 °C)] 97.4 °F (36.3 °C)  Heart Rate:  [] 107  Resp:  [14-18] 14  BP: (128-133)/(58-84) 128/58  Physical Exam  Constitutional: She is oriented to person, place, and time. She appears well-developed and well-nourished.   HENT:   Head: Normocephalic and  atraumatic.   Eyes: EOM are normal. Pupils are equal, round, and reactive to light.   Neck: Normal range of motion. Neck supple.   Cardiovascular: Normal rate, regular rhythm, normal heart sounds and intact distal pulses. Exam reveals no gallop and no friction rub.   No murmur heard.  Pulmonary/Chest: Effort normal and breath sounds normal. She has no wheezes. She has no rales.   Decreased breath sounds. Lungs clear. No oxygen in use.   Abdominal: Soft. Bowel sounds are normal.   Genitourinary:   Genitourinary Comments: Voiding.   Musculoskeletal: Normal range of motion. She exhibits no edema or deformity.   Neurological: She is alert and oriented to person, place, and time.   Skin: Skin is warm and dry.   Psychiatric: She has a normal mood and affect. Her behavior is normal. Judgment and thought content normal.      Results Review:  I have reviewed the labs, radiology results, and diagnostic studies.    Laboratory Data:   Results from last 7 days   Lab Units 01/18/19  0528 01/17/19 0447 01/16/19  1033   WBC 10*3/mm3 15.24* 13.12* 18.73*   HEMOGLOBIN g/dL 8.5* 9.8* 9.3*   HEMATOCRIT % 25.8* 30.0* 27.5*   PLATELETS 10*3/mm3 532* 490* 490*        Results from last 7 days   Lab Units 01/18/19  0528 01/17/19 0447 01/16/19  1033   SODIUM mmol/L 137 132* 129*   POTASSIUM mmol/L 3.8 4.4 3.9   CHLORIDE mmol/L 100 94* 89*   CO2 mmol/L 27.0 26.0 31.0   BUN mg/dL 15 15 17   CREATININE mg/dL 0.37* 0.35* 0.29*   CALCIUM mg/dL 7.7* 7.7* 8.0*   BILIRUBIN mg/dL 0.2 0.3 0.5   ALK PHOS U/L 103 127* 139*   ALT (SGPT) U/L 17 19 23   AST (SGOT) U/L 17 22 52*   GLUCOSE mg/dL 123* 220* 98     Blood Culture   Date Value Ref Range Status   01/16/2019 Abnormal Stain (A)  Preliminary   01/16/2019 Gram Positive Cocci (A)  Preliminary   01/16/2019 Abnormal Stain (A)  Preliminary   01/16/2019 Gram Positive Cocci (A)  Preliminary     Respiratory Culture   Date Value Ref Range Status   01/16/2019 No growth at less than 24 hours  Preliminary      Imaging Results (all)     Procedure Component Value Units Date/Time    CT Angiogram Chest With Contrast [[id number]] Collected:  01/16/19 1236     Updated:  01/16/19 1250    Narrative:       CT ANGIOGRAM CHEST W CONTRAST- 1/16/2019 12:20 PM CST      HISTORY: Shortness of air and chest pain      COMPARISON: Radiation planning CT 12/31/2018 and CT of the chest  12/8/2018.      DOSE LENGTH PRODUCT: 405 mGy cm. Automated exposure control was also  utilized to decrease patient radiation dose.     TECHNIQUE: Helical tomographic images of the chest were obtained after  the administration of intravenous contrast following angiogram protocol.  Additionally, 3D and multiplanar reformatted images were provided.        FINDINGS:    Pulmonary arteries: There is no evidence of pulmonary embolus. The  pulmonary arteries are slightly enlarged, consistent with pulmonary  arterial hypertension     Aorta and great vessels: There is atherosclerosis in the aorta without  aneurysm or dissection. There is atherosclerosis in the great vessels.  The patient's port extends into the SVC.     Visualized neck base: The imaged portion of the base of the neck appears  unremarkable.      Lungs: There is a large mass in the LEFT lower lobe. Surrounding  consolidation is present. Areas of cavitation are seen within the mass.  These are new since the previous study. Moderate emphysematous changes  are present in the lungs.       Heart: The heart is normal in size. Coronary artery calcifications are  visualized. Trace pericardial fluid is present.      Mediastinum and lymph nodes: Mediastinal, LEFT hilar, and subcarinal  lymphadenopathy is unchanged. There is a new LEFT axillary lymph node  that measures 1.3 cm in diameter.      Skeletal and soft tissues: A metastatic lesion is seen in the inferior  glenoid process of the LEFT scapula. No other destructive lesions are  seen in the visualized bones.     Upper abdomen: Enlarging adrenal metastases  are seen bilaterally. These  measure up to 6.7 cm.       Impression:       1. No evidence of pulmonary embolus.  2. LEFT lower lobe lung mass with surrounding pneumonia. The pneumonia  is new since the previous study.  3. Mediastinal and LEFT hilar lymphadenopathy is again noted.  4. There is a new enlarged lymph node in the LEFT axilla that measures  1.3 cm in short axis.  5. New destructive changes in the inferior glenoid on the LEFT. This is  consistent with metastatic disease.  6. Large metastatic masses in the adrenal glands are again noted. These  have enlarged.        This report was finalized on 01/16/2019 12:47 by Dr. Yousif Herrera MD.    XR Chest 1 View [[id number]] Collected:  01/16/19 1118     Updated:  01/16/19 1123    Narrative:       History:  68-year-old chest pain.     Reference:  Chest radiograph December 11, 2018. CT chest December 2018.     Findings:  Frontal chest radiograph performed.     Extensive opacity in the left lower lobe base corresponds with known  tumor. New hazy opacity is in the mid left lower lobe and lingula. Lungs  are emphysematous. Questionable small left pleural effusion. No  pneumothorax. Left subclavian Port-A-Cath, tip in SVC.          Impression:          1. New hazy opacities in the left upper lobe, questionable developing  pneumonia.  2. Known extensive left lower lobe tumor.  This report was finalized on 01/16/2019 11:20 by Dr Riley Acevedo, .          Intake/Output    Intake/Output Summary (Last 24 hours) at 1/18/2019 0835  Last data filed at 1/18/2019 0525  Gross per 24 hour   Intake 700 ml   Output 1250 ml   Net -550 ml       Scheduled Meds    aspirin 81 mg Oral Daily   cefepime 2 g Intravenous Q12H   enoxaparin 30 mg Subcutaneous Q24H   fentaNYL 1 patch Transdermal Q72H   guaiFENesin 1,200 mg Oral Q12H   megestrol 400 mg Oral Daily   pantoprazole 20 mg Oral Daily   polyethylene glycol 17 g Oral Daily   vancomycin 15 mg/kg Intravenous Q12H       I have reviewed the  patient current medications.     Assessment/Plan   Assessment:  1.  Simple sepsis  2.  Hospital-acquired pneumonia, left lower lobe  3.  Widespread metastatic cancer  4.  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  Vulvar, right labial mass lesion, primary squamous cell carcinoma  6.  Normocytic anemia likely of malignancy, anemia chronic disease  7.  Thrombocytosis suspect reactive  8.  Long-standing tobacco use, recently quit  9.  Essential hypertension  10.  Leukocytosis, resolving  11.  Anemia, improving     Plan:  1.  Received aspirin, normal saline fluid bolus, Zosyn in ER  2.  Cefepime IV, azithromycin IV day 3  3.  Mucinex 1200 mg twice daily  4.  Duo nebs every 4 hours as needed  5.  Legionella negative, strep pneumonia negative  6.  Sputum culture no growth  7.  Blood culture gram-positive cocci, not aureus.. Suspect contaminant.  Repeat blood culture MAHAMED today  8.  Oncology, Dr. Rincon following  9.  XRT today  10.  Lovenox for deep vein thrombosis prophylaxis  11.  Discontinue IV fluids  12.  Physical therapy consult  13.  Palliative care consulted yesterday.  14.  Patient was on hospice.  She had to come off of hospice while receiving XRT.  She is [facility].  Plans return to hospice after completes radiation.  14.   for discharge planning.      Discharge Planning: I expect patient to be discharged to home in 1-2 days.    Beatriz Durand, APRN   01/18/19   8:35 AM

## 2019-01-18 NOTE — PROGRESS NOTES
Pharmacy Dosing Service  Antimicrobials  Cefepime    Assessment/Action/Plan:  Cefepime dose adjusted to 2g IV q12 via extended infusion per protocol based on indication and indication. Pharmacy will continue to follow daily and adjust as needed.     Subjective:  Kasey Rios is a 68 y.o. female currently being treated for sepsis, pneumonia.    Objective:  Estimated Creatinine Clearance: 54.2 mL/min (A) (by C-G formula based on SCr of 0.37 mg/dL (L)).   Lab Results   Component Value Date    CREATININE 0.37 (L) 01/18/2019    CREATININE 0.35 (L) 01/17/2019    CREATININE 0.29 (L) 01/16/2019     Lab Results   Component Value Date    WBC 15.24 (H) 01/18/2019     Temp Readings from Last 1 Encounters:   01/18/19 97.4 °F (36.3 °C) (Temporal)       Culture Results:  Microbiology Results (last 10 days)       Procedure Component Value - Date/Time    Legionella Antigen, Urine - Urine, Urine, Clean Catch [[id number]]  (Normal) Collected:  01/17/19 0241    Lab Status:  Final result Specimen:  Urine, Clean Catch Updated:  01/17/19 0306     LEGIONELLA ANTIGEN, URINE Negative    S. Pneumo Ag Urine or CSF - Urine, Urine, Clean Catch [[id number]]  (Normal) Collected:  01/17/19 0241    Lab Status:  Final result Specimen:  Urine, Clean Catch Updated:  01/17/19 0306     Strep Pneumo Ag Negative    Respiratory Culture - Sputum, Cough [[id number]] Collected:  01/16/19 2240    Lab Status:  Preliminary result Specimen:  Sputum from Cough Updated:  01/17/19 1212     Respiratory Culture No growth at less than 24 hours     Gram Stain Greater than 25 WBCs per low power field      No Epithelial cells per low power field      Rare (1+) Mixed gram positive khushboo    Blood Culture - Blood, Hand, Left [[id number]]  (Abnormal) Collected:  01/16/19 1357    Lab Status:  Preliminary result Specimen:  Blood from Hand, Left Updated:  01/17/19 1445     Blood Culture Abnormal Stain      Gram Positive Cocci     Isolated from Anaerobic Bottle     Gram Stain Gram  positive cocci in clusters    Blood Culture - Blood, Arm, Left [[id number]]  (Abnormal) Collected:  01/16/19 1325    Lab Status:  Preliminary result Specimen:  Blood from Arm, Left Updated:  01/17/19 1300     Blood Culture Abnormal Stain      Gram Positive Cocci     Isolated from Anaerobic Bottle     Gram Stain Gram positive cocci in clusters    Blood Culture ID, PCR - Blood, Arm, Left [[id number]]  (Abnormal) Collected:  01/16/19 1325    Lab Status:  Final result Specimen:  Blood from Arm, Left Updated:  01/17/19 1414     BCID, PCR Staphylococcus spp, not aureus. Identification by BCID PCR.          Current Antimicrobials:   Anti-Infectives (From admission, onward)      Ordered     Dose/Rate Route Frequency Start Stop    01/18/19 0708  cefepime (MAXIPIME) 2 g/100 mL 0.9% NS (mbp)     Ordering Provider:  Pernell Osorio MD    2 g  200 mL/hr over 30 Minutes Intravenous Every 12 Hours 01/18/19 1630 01/25/19 1629    01/16/19 1548  vancomycin 750 mg/250 mL 0.9% NS IVPB ([facility])     Ordering Provider:  Sweetie Lopez DO    15 mg/kg × 50.3 kg  over 60 Minutes Intravenous Every 12 Hours 01/17/19 0400 01/26/19 0359    01/16/19 1307  vancomycin 1 g/250 mL 0.9% NS (vial-mate)     Ordering Provider:  Chong Angeles PA-C    1,000 mg Intravenous Once 01/16/19 1309 01/16/19 1818    01/16/19 1307  piperacillin-tazobactam (ZOSYN) 3.375 g/100 mL 0.9% NS IVPB (mbp)     Ordering Provider:  Chong Angeles PA-C    3.375 g  over 30 Minutes Intravenous Once 01/16/19 1309 01/16/19 1546          SOMMER Gonzalez, Pharm.D.    01/18/19 7:08 AM

## 2019-01-18 NOTE — PLAN OF CARE
Problem: Patient Care Overview  Goal: Plan of Care Review  Outcome: Ongoing (interventions implemented as appropriate)   01/18/19 2603   Coping/Psychosocial   Plan of Care Reviewed With patient   Plan of Care Review   Progress no change   OTHER   Outcome Summary Patient c/o pain, prn meds given with good relief. Worked with PT/OT today. VSS.        Problem: Fall Risk (Adult)  Goal: Identify Related Risk Factors and Signs and Symptoms  Outcome: Ongoing (interventions implemented as appropriate)    Goal: Absence of Fall  Outcome: Ongoing (interventions implemented as appropriate)      Problem: Skin Injury Risk (Adult)  Goal: Identify Related Risk Factors and Signs and Symptoms  Outcome: Ongoing (interventions implemented as appropriate)    Goal: Skin Health and Integrity  Outcome: Ongoing (interventions implemented as appropriate)      Problem: Pneumonia (Adult)  Goal: Signs and Symptoms of Listed Potential Problems Will be Absent, Minimized or Managed (Pneumonia)  Outcome: Ongoing (interventions implemented as appropriate)      Problem: Pain, Chronic (Adult)  Goal: Acceptable Pain/Comfort Level and Functional Ability  Outcome: Ongoing (interventions implemented as appropriate)

## 2019-01-18 NOTE — PROGRESS NOTES
Continued Stay Note   Marylu     Patient Name: Kasey Rios  MRN: [id number]  Today's Date: 1/18/2019    Admit Date: 1/16/2019    Discharge Plan     Row Name 01/18/19 0928       Plan    Plan  Home Health (if needed)    Patient/Family in Agreement with Plan  yes    Plan Comments  Spoke with brother in room (son left) to update on d/c plans.  Pt will return home with son as before. They are open to traditional HH to be ordered again if needed. Pt on  program right now from [facility], nothing to do with HH care.  Pt will be followed by [facility] upon d/c as well. Will follow.     Addendum:  Son has questions re: changing DME from one home to another, pt will be staying with her brother, Sukhjinder Valencia [address] [place] [address]. Phone [phone]. .  Mayela Ponce from [facility]  program has already took care of this, [facility] is working on issue of changing hospital bed to different location.          Discharge Codes    No documentation.             VADIM Sommer

## 2019-01-18 NOTE — THERAPY EVALUATION
Acute Care - Physical Therapy Initial Evaluation  [facility]     Patient Name: Kasey Rios  : 1950  MRN: [id number]  Today's Date: 2019   Onset of Illness/Injury or Date of Surgery: 19  Date of Referral to PT: 19  Referring Physician: Dr. Osorio      Admit Date: 2019    Visit Dx:     ICD-10-CM ICD-9-CM   1. Hospital-acquired pneumonia J18.9 486   2. 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)   • Encounter for consultation   • Current every day smoker   • Cancer related pain   • Multiple lesions of metastatic malignancy (CMS/HCC)   • Mass of left lung   • BMI 26.0-26.9,adult   • Counseling regarding advanced care planning and goals of care   • Primary squamous cell carcinoma of vulva (CMS/HCC)   • Regional lymph node metastasis present (CMS/HCC)   • Hospital-acquired pneumonia     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) 2018    Added automatically from request for surgery [id number]     Past Surgical History:   Procedure Laterality Date   • APPENDECTOMY     • ECTOPIC PREGNANCY  1975    Ruptured ectopic   • KYPHOPLASTY WITH BIOPSY N/A 12/10/2018    Procedure: KYPHOPLASTY WITH BIOPSY RADIOFREQUENCY TREATMENT TO TUMOR;  Surgeon: Erwin Benitez MD;  Location: [facility] OR;  Service: Neurosurgery   • LUMBAR LAMINECTOMY N/A 12/10/2018    Procedure: LUMBAR LAMINECTOMY WITHOUT FUSION L3 REMOVAL SPINAL TUMOR;  Surgeon: Erwin Benitez MD;  Location: [facility] OR;  Service: Neurosurgery   • TOTAL ABDOMINAL HYSTERECTOMY WITH SALPINGO OOPHORECTOMY  1978    DUB, Fibroids, benign   • VENOUS ACCESS DEVICE (PORT) INSERTION N/A 2018    Procedure: INSERTION VENOUS ACCESS DEVICE;  Surgeon: Ellie Griffith MD;  Location: [facility] OR;  Service: General    • VULVA BIOPSY Bilateral 12/10/2018    Procedure: VULVA BIOPSY;  Surgeon: Alonzo Lugo MD;  Location: [facility] OR;  Service: Obstetrics/Gynecology        PT ASSESSMENT (last 12 hours)      Physical Therapy Evaluation     Row Name 01/18/19 0954          PT Evaluation Time/Intention    Subjective Information  complains of;pain;weakness;fatigue;dyspnea  -SB     Document Type  evaluation  -SB     Mode of Treatment  physical therapy  -SB     Patient Effort  good  -SB     Symptoms Noted During/After Treatment  shortness of breath  -SB     Row Name 01/18/19 0954          General Information    Patient Profile Reviewed?  yes  -SB     Onset of Illness/Injury or Date of Surgery  01/16/19  -SB     Referring Physician  ABELINO Durand  -SB     Patient Observations  alert;cooperative;agree to therapy  -SB     Patient/Family Observations  brother, son and sister present  -SB     General Observations of Patient  fowlers, alert, family present  -SB     Prior Level of Function  min assist:;all household mobility;transfer;independent: taking sponge bath on her own  -SB     Equipment Currently Used at Home  walker, rolling;hospital bed;commode, bedside  -SB     Pertinent History of Current Functional Problem  Pt presents 1/16 with SOA. Dx of pneumonia. 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   -SB     Existing Precautions/Restrictions  fall  -SB     Risks Reviewed  patient:;family:;LOB;nausea/vomiting;dizziness;increased discomfort;change in vital signs;increased drainage;lines disloged  -SB     Benefits Reviewed  patient:;family:;improve function;increase independence;increase strength;increase balance;decrease pain;decrease risk of DVT;improve skin integrity;increase knowledge  -SB     Barriers to Rehab  previous functional deficit;medically complex  -SB     Row Name 01/18/19 0954          Relationship/Environment    Lives With  child(jessica), adult  -SB     Name(s) of Who Lives With Patient   Lele  -SB     Row Name 01/18/19 0954          Resource/Environmental Concerns    Current Living Arrangements  home/apartment/condo  -SB     Resource/Environmental Concerns  none  -SB     Transportation Concerns  other (see comments) unable to sit because of med dx of Ca  -SB     Row Name 01/18/19 0954          Home Main Entrance    Number of Stairs, Main Entrance  one one in the garage, one in the home  -SB     Stair Railings, Main Entrance  none  -SB     Row Name 01/18/19 0954          Cognitive Assessment/Interventions    Additional Documentation  Cognitive Assessment/Intervention (Group)  -SB     Row Name 01/18/19 0954          Cognitive Assessment/Intervention- PT/OT    Affect/Mental Status (Cognitive)  WFL  -SB     Orientation Status (Cognition)  oriented x 4  -SB     Follows Commands (Cognition)  WFL  -SB     Cognitive Function (Cognitive)  WFL  -SB     Row Name 01/18/19 0971          Safety Issues, Functional Mobility    Impairments Affecting Function (Mobility)  strength;shortness of breath;range of motion (ROM);pain;endurance/activity tolerance  -SB     Row Name 01/18/19 0954          Bed Mobility Assessment/Treatment    Bed Mobility Assessment/Treatment  rolling left;rolling right  -SB     Rolling Left [place] (Bed Mobility)  supervision  -SB     Rolling Right [place] (Bed Mobility)  supervision  -SB     Bed Mobility, Safety Issues  decreased use of legs for bridging/pushing  -SB     Assistive Device (Bed Mobility)  bed rails  -SB     Row Name 01/18/19 0954          Transfer Assessment/Treatment    Transfer Assessment/Treatment  other (see comments) not performed due to safety  -SB     Row Name 01/18/19 0954          General ROM    GENERAL ROM COMMENTS  BLE WFL  -SB     Row Name 01/18/19 0954          MMT (Manual Muscle Testing)    General MMT Comments  BLE grossly 3+/5  -SB     Row Name 01/18/19 0954          Sensory Assessment/Intervention    Sensory General Assessment  no sensation deficits  identified  -SB     Row Name 01/18/19 0954          Pain Assessment    Additional Documentation  Pain Scale: Numbers Pre/Post-Treatment (Group)  -SB     Row Name 01/18/19 0954          Pain Scale: Numbers Pre/Post-Treatment    Pain Scale: Numbers, Pretreatment  5/10  -SB     Pain Scale: Numbers, Post-Treatment  5/10  -SB     Pain Location  back  -SB     Pain Intervention(s)  Repositioned  -SB     Row Name             Wound 12/08/18 1600 Right labia other (see comments)    Wound - Properties Group Date first assessed: 12/08/18  -SK Time first assessed: 1600  -SK Present On Admission : yes  -SK Side: Right  -SK Location: labia  -SK Type: other (see comments)  -SK    Row Name             Wound 12/10/18 1739 Other (See comments) back incision    Wound - Properties Group Date first assessed: 12/10/18  -HUGO Time first assessed: 1739  -HUGO Side: Other (See comments)  -HUGO Location: back  -HUGO Type: incision  -HUGO    Row Name             Wound 01/16/19 1600 Bilateral lower coccyx skin tear    Wound - Properties Group Date first assessed: 01/16/19  -AW Time first assessed: 1600  -AW Present On Admission : yes  -AW Side: Bilateral  -AW Orientation: lower  -AW Location: coccyx  -AW Type: skin tear  -AW    Row Name 01/18/19 0954          Plan of Care Review    Plan of Care Reviewed With  patient;family  -SB     Row Name 01/18/19 0954          Physical Therapy Clinical Impression    Date of Referral to PT  01/18/19  -SB     Patient/Family Goals Statement (PT Clinical Impression)  get stronger  -SB     Criteria for Skilled Interventions Met (PT Clinical Impression)  yes  -SB     Rehab Potential (PT Clinical Summary)  fair, will monitor progress closely  -SB     Predicted Duration of Therapy (PT)  until d.c  -SB     Care Plan Review (PT)  evaluation/treatment results reviewed;care plan/treatment goals reviewed;risks/benefits reviewed;current/potential barriers reviewed;patient/other agree to care plan  -SB     Care Plan Review, Other  Participant (PT Clinical Impression)  family  -SB     Row Name 01/18/19 0954          Physical Therapy Goals    Bed Mobility Goal Selection (PT)  bed mobility, PT goal 1  -SB     Transfer Goal Selection (PT)  transfer, PT goal 1  -SB     Gait Training Goal Selection (PT)  gait training, PT goal 1  -SB     Row Name 01/18/19 0954          Bed Mobility Goal 1 (PT)    Activity/Assistive Device (Bed Mobility Goal 1, PT)  rolling to left;rolling to right  -SB     [place] Level/Cues Needed (Bed Mobility Goal 1, PT)  conditional independence  -SB     Time Frame (Bed Mobility Goal 1, PT)  by discharge  -SB     Progress/Outcomes (Bed Mobility Goal 1, PT)  goal ongoing  -SB     Row Name 01/18/19 0954          Transfer Goal 1 (PT)    Activity/Assistive Device (Transfer Goal 1, PT)  sit-to-stand/stand-to-sit possibly sidelying to stand because pt has pain with sitting  -SB     [place] Level/Cues Needed (Transfer Goal 1, PT)  moderate assist (50-74% patient effort)  -SB     Time Frame (Transfer Goal 1, PT)  by discharge  -SB     Progress/Outcome (Transfer Goal 1, PT)  goal ongoing  -SB     Row Name 01/18/19 0954          Gait Training Goal 1 (PT)    Activity/Assistive Device (Gait Training Goal 1, PT)  gait (walking locomotion);walker, rolling  -SB     [place] Level (Gait Training Goal 1, PT)  minimum assist (75% or more patient effort)  -SB     Distance (Gait Goal 1, PT)  10  -SB     Time Frame (Gait Training Goal 1, PT)  by discharge  -SB     Progress/Outcome (Gait Training Goal 1, PT)  goal ongoing  -SB     Row Name 01/18/19 0954          Positioning and Restraints    Pre-Treatment Position  in bed  -SB     Post Treatment Position  bed  -SB     In Bed  fowlers;call light within reach;encouraged to call for assist  -SB     Row Name 01/18/19 0954          Living Environment    Home Accessibility  stairs to enter home;tub/shower is not walk in plans to live with brother once she leaves hosp  -SB       User Key  (r)  = Recorded By, (t) = Taken By, (c) = Cosigned By    Initials Name Provider Type    SK Hodan Alberto, RN Registered Nurse    Henrietta Falcon, RN Registered Nurse    Avelina Fitzpatrick, RN Registered Nurse    Maria Antonia Pace, PT Physical Therapist        Physical Therapy Education     Title: PT OT SLP Therapies (Done)     Topic: Physical Therapy (Done)     Point: Mobility training (Done)     Learning Progress Summary           Patient Acceptance, E, VU by SB at 1/18/2019  1:35 PM    Comment:  educated on POC, benefits of activity and d/c plans   Family Acceptance, E, VU by SB at 1/18/2019  1:35 PM    Comment:  educated on POC, benefits of activity and d/c plans                   Point: Home exercise program (Done)     Learning Progress Summary           Patient Acceptance, E, VU by SB at 1/18/2019  1:35 PM    Comment:  educated on POC, benefits of activity and d/c plans   Family Acceptance, E, VU by SB at 1/18/2019  1:35 PM    Comment:  educated on POC, benefits of activity and d/c plans                   Point: Body mechanics (Done)     Learning Progress Summary           Patient Acceptance, E, VU by SB at 1/18/2019  1:35 PM    Comment:  educated on POC, benefits of activity and d/c plans   Family Acceptance, E, VU by SB at 1/18/2019  1:35 PM    Comment:  educated on POC, benefits of activity and d/c plans                   Point: Precautions (Done)     Learning Progress Summary           Patient Acceptance, E, VU by SB at 1/18/2019  1:35 PM    Comment:  educated on POC, benefits of activity and d/c plans   Family Acceptance, E, VU by SB at 1/18/2019  1:35 PM    Comment:  educated on POC, benefits of activity and d/c plans                               User Key     Initials Effective Dates Name Provider Type Discipline     08/31/18 -  Maria Antonia Cho PT Physical Therapist PT              PT Recommendation and Plan  Anticipated Discharge Disposition (PT): home with home health, home with 24/7 care, skilled  nursing facility(pending progress and pt wishes)  Planned Therapy Interventions (PT Eval): balance training, bed mobility training, home exercise program, gait training, patient/family education, strengthening, ROM (range of motion), transfer training  Therapy Frequency (PT Clinical Impression): daily  Outcome Summary/Treatment Plan (PT)  Anticipated Discharge Disposition (PT): home with home health, home with 24/7 care, skilled nursing facility(pending progress and pt wishes)  Plan of Care Reviewed With: patient, family  Progress: no change(eval day)  Outcome Summary: PT evaluation completed. Pt alert and oriented x4. Pt performed bed mobility with sup. Pt unable to sit because of cancer location and pain level, therefore we were unable to perform any balance activities or attempts to stand. Pt with significantly decreased strength in BLE. Pt wants to have therapy while in hospital and would benefit from strengthening and endurance activities. Pt plans to d/c home with her brother at this time pending progress and recommmend home health if desired to help improve safety and functional mobility. Pt will require max assist for most activities if d/c home, but it does seem like this may be best for her at this point in her care.   Outcome Measures     Row Name 01/18/19 1300             How much help from another person do you currently need...    Turning from your back to your side while in flat bed without using bedrails?  3  -SB      Moving from lying on back to sitting on the side of a flat bed without bedrails?  3  -SB      Moving to and from a bed to a chair (including a wheelchair)?  1  -SB      Standing up from a chair using your arms (e.g., wheelchair, bedside chair)?  1  -SB      Climbing 3-5 steps with a railing?  1  -SB      To walk in hospital room?  1  -SB      AM-PAC 6 Clicks Score  10  -SB         Functional Assessment    Outcome Measure Options  AM-PAC 6 Clicks Basic Mobility (PT)  -SB        User Key   (r) = Recorded By, (t) = Taken By, (c) = Cosigned By    Initials Name Provider Type    Maria Antonia Pace PT Physical Therapist         Time Calculation:   PT Charges     Row Name 01/18/19 1335             Time Calculation    Start Time  0954  -SB      Stop Time  1056  -SB      Time Calculation (min)  62 min  -SB      PT Received On  01/18/19  -SB      PT Goal Re-Cert Due Date  01/28/19  -SB        User Key  (r) = Recorded By, (t) = Taken By, (c) = Cosigned By    Initials Name Provider Type    Maria Antonia Pace PT Physical Therapist        Therapy Suggested Charges     Code   Minutes Charges    None           Therapy Charges for Today     Code Description Service Date Service Provider Modifiers Qty    [id number] HC PT EVAL LOW COMPLEXITY 4 1/18/2019 Maria Antonia Cho, PT  1          PT G-Codes  Outcome Measure Options: AM-PAC 6 Clicks Basic Mobility (PT)  AM-PAC 6 Clicks Score: 10      Maria Antonia Cho PT  1/18/2019

## 2019-01-18 NOTE — PLAN OF CARE
Problem: Patient Care Overview  Goal: Plan of Care Review  Outcome: Ongoing (interventions implemented as appropriate)   01/18/19 0330   Coping/Psychosocial   Plan of Care Reviewed With patient   Plan of Care Review   Progress no change   OTHER   Outcome Summary VSS. C/O generalized pain. PRN percocet given along with xanax. Resting well. Cont IV antibiotics. Palliative radiation resumed. Cont to monitor.        Problem: Fall Risk (Adult)  Goal: Identify Related Risk Factors and Signs and Symptoms  Outcome: Ongoing (interventions implemented as appropriate)    Goal: Absence of Fall  Outcome: Ongoing (interventions implemented as appropriate)      Problem: Skin Injury Risk (Adult)  Goal: Identify Related Risk Factors and Signs and Symptoms  Outcome: Ongoing (interventions implemented as appropriate)    Goal: Skin Health and Integrity  Outcome: Ongoing (interventions implemented as appropriate)      Problem: Pneumonia (Adult)  Goal: Signs and Symptoms of Listed Potential Problems Will be Absent, Minimized or Managed (Pneumonia)  Outcome: Ongoing (interventions implemented as appropriate)      Problem: Pain, Chronic (Adult)  Goal: Acceptable Pain/Comfort Level and Functional Ability  Outcome: Ongoing (interventions implemented as appropriate)

## 2019-01-18 NOTE — PROGRESS NOTES
"[facility] Progress Note    Progress Note    Patient:  Kasye Rios  YOB: 1950  Date of Service: 1/18/2019  MRN: [id number]   Acct: [id number]   Primary Care Physician: Provider, No Known  Advance Directive:   Code Status and Medical Interventions:   Ordered at: 01/16/19 1532     Level Of Support Discussed With:    Patient     Code Status:    CPR     Medical Interventions (Level of Support Prior to Arrest):    Full     Admit Date: 1/16/2019       Hospital Day: 2      Subjective:     Chief Compliant: \"Pretty good today.\"    Subjective:  Slept well.  Breathing subjectively improved.  Improved cough productive of scant, yellow phlegm.  Lingering vague chest wall discomfort.      Interval history:  Palliative radiation resumed.  Has been seen by palliative care.    Review of Systems:   Constitutional / general:  No fever /No chills /No sweats  HEENT: No sore throat /No hoarseness /No vision changes  CV:  (+) chest pain /No palpitations/No orthopnea   Respiratory:  Improved (+) cough /Improved (+) shortness of breath /(+) sputum /No hemoptysis  GI: No nausea /No vomiting /No abdominal pain /No diarrhea /No constipation  :  No dysuria /No hesitancy /No urgency /No hematuria   Neuro: Generalized (+) muscle weakness /No dysphagia /No headache /No paresthesias  Musculoskeletal:  No edema /No cyanosis /Improved (+) pain    Vitals: /58 (BP Location: Right arm, Patient Position: Lying)   Pulse 107   Temp 97.4 °F (36.3 °C) (Temporal)   Resp 14   Ht 152.4 cm (60\")   Wt 51 kg (112 lb 6.4 oz)   SpO2 95%   BMI 21.95 kg/m²     General appearance: alert, chronically ill appearing, appears stated age and cooperative in bed.  Her son is at the bedside  Skin: Skin color pale, texture, turgor normal. No rashes or lesions  HEENT: Head: Normal, normocephalic, atraumatic.  Neck: no adenopathy, no carotid bruit, no JVD, supple, symmetrical, trachea midline and thyroid not enlarged, symmetric, no " tenderness/mass/nodules  Lungs: diminished breath sounds bilaterally  Heart: regular rate and rhythm  Abdomen: soft, non-tender; bowel sounds normal; no masses,  no organomegaly  Extremities: extremities normal, atraumatic, no cyanosis or edema  Neurologic: Mental status: Alert, oriented, thought content appropriate    24HR INTAKE/OUTPUT:      Intake/Output Summary (Last 24 hours) at 1/18/2019 0935  Last data filed at 1/18/2019 0857  Gross per 24 hour   Intake 700 ml   Output 1250 ml   Net -550 ml        [place] Catheter: None    Diet: Diet Regular      Medications:   Scheduled Meds:    aspirin 81 mg Oral Daily   cefepime 2 g Intravenous Q12H   enoxaparin 30 mg Subcutaneous Q24H   fentaNYL 1 patch Transdermal Q72H   guaiFENesin 1,200 mg Oral Q12H   megestrol 400 mg Oral Daily   pantoprazole 20 mg Oral Daily   polyethylene glycol 17 g Oral Daily   vancomycin 15 mg/kg Intravenous Q12H       Continuous Infusions:     Labs:     Results from last 7 days   Lab Units 01/18/19  0528 01/17/19 0447 01/16/19  1033   WBC 10*3/mm3 15.24* 13.12* 18.73*   HEMOGLOBIN g/dL 8.5* 9.8* 9.3*   HEMATOCRIT % 25.8* 30.0* 27.5*   PLATELETS 10*3/mm3 532* 490* 490*        Results from last 7 days   Lab Units 01/18/19  0528 01/17/19 0447 01/16/19  1033   SODIUM mmol/L 137 132* 129*   POTASSIUM mmol/L 3.8 4.4 3.9   CHLORIDE mmol/L 100 94* 89*   CO2 mmol/L 27.0 26.0 31.0   BUN mg/dL 15 15 17   CREATININE mg/dL 0.37* 0.35* 0.29*   CALCIUM mg/dL 7.7* 7.7* 8.0*   BILIRUBIN mg/dL 0.2 0.3 0.5   ALK PHOS U/L 103 127* 139*   ALT (SGPT) U/L 17 19 23   AST (SGOT) U/L 17 22 52*   GLUCOSE mg/dL 123* 220* 98       ABG:  No results found for: PHART, PO2ART, LXF6HTV    Culture Data:   01/16/2019 2240  01/17/2019 1212  Respiratory Culture - Sputum, Cough [[id number]]   Sputum from Cough     Preliminary result  Respiratory Culture No growth at less than 24 hours   Gram Stain Greater than 25 WBCs per low power field    No Epithelial cells per low power field     Rare (1+) Mixed gram positive khushboo             01/16/2019 1957  01/16/2019 2016  Mycoplasma Pneumoniae PCR - Swab, Nasopharynx [[id number]]   Swab from Nasopharynx     In process  No result data             01/16/2019 1526  01/16/2019 1543  Lactic Acid, Plasma [[id number]]   Blood     Final result  Lactate 0.7 mmol/L          01/16/2019 1359  01/16/2019 1436  Troponin [[id number]]   Blood     Final result  Troponin I <0.012 ng/mL          01/16/2019 1357  01/17/2019 1445  Blood Culture - Blood, Hand, Left [[id number]]   (Abnormal)   Blood from Hand, Left     Preliminary result  Blood Culture Abnormal Stain Abnormal     Gram Positive Cocci Abnormal    Isolated from Anaerobic Bottle   Gram Stain Gram positive cocci in clusters             01/16/2019 1325  01/17/2019 1300  Blood Culture - Blood, Arm, Left [[id number]]   (Abnormal)   Blood from Arm, Left     Preliminary result  Blood Culture Abnormal Stain Abnormal     Gram Positive Cocci Abnormal    Isolated from Anaerobic Bottle   Gram Stain Gram positive cocci in clusters             01/16/2019 1325  01/17/2019 1414  Blood Culture ID, PCR - Blood, Arm, Left [[id number]]   (Abnormal)   Blood from Arm, Left     Final result  BCID, PCR Staphylococcus spp, not aureus. Identification by BCID PCR. Critical                    Blood Culture   Date Value Ref Range Status   01/16/2019 No growth at less than 24 hours  Preliminary   01/16/2019 No growth at less than 24 hours  Preliminary     Respiratory Culture   Date Value Ref Range Status   01/16/2019 No growth  Preliminary       Radiology Data:   Imaging Results (last 72 hours)     Procedure Component Value Units Date/Time    CT Angiogram Chest With Contrast [[id number]] Collected:  01/16/19 1236     Updated:  01/16/19 1250    Narrative:       CT ANGIOGRAM CHEST W CONTRAST- 1/16/2019 12:20 PM CST      HISTORY: Shortness of air and chest pain      COMPARISON: Radiation planning CT 12/31/2018 and CT of the chest  12/8/2018.      DOSE  LENGTH PRODUCT: 405 mGy cm. Automated exposure control was also  utilized to decrease patient radiation dose.     TECHNIQUE: Helical tomographic images of the chest were obtained after  the administration of intravenous contrast following angiogram protocol.  Additionally, 3D and multiplanar reformatted images were provided.        FINDINGS:    Pulmonary arteries: There is no evidence of pulmonary embolus. The  pulmonary arteries are slightly enlarged, consistent with pulmonary  arterial hypertension     Aorta and great vessels: There is atherosclerosis in the aorta without  aneurysm or dissection. There is atherosclerosis in the great vessels.  The patient's port extends into the SVC.     Visualized neck base: The imaged portion of the base of the neck appears  unremarkable.      Lungs: There is a large mass in the LEFT lower lobe. Surrounding  consolidation is present. Areas of cavitation are seen within the mass.  These are new since the previous study. Moderate emphysematous changes  are present in the lungs.       Heart: The heart is normal in size. Coronary artery calcifications are  visualized. Trace pericardial fluid is present.      Mediastinum and lymph nodes: Mediastinal, LEFT hilar, and subcarinal  lymphadenopathy is unchanged. There is a new LEFT axillary lymph node  that measures 1.3 cm in diameter.      Skeletal and soft tissues: A metastatic lesion is seen in the inferior  glenoid process of the LEFT scapula. No other destructive lesions are  seen in the visualized bones.     Upper abdomen: Enlarging adrenal metastases are seen bilaterally. These  measure up to 6.7 cm.       Impression:       1. No evidence of pulmonary embolus.  2. LEFT lower lobe lung mass with surrounding pneumonia. The pneumonia  is new since the previous study.  3. Mediastinal and LEFT hilar lymphadenopathy is again noted.  4. There is a new enlarged lymph node in the LEFT axilla that measures  1.3 cm in short axis.  5. New  destructive changes in the inferior glenoid on the LEFT. This is  consistent with metastatic disease.  6. Large metastatic masses in the adrenal glands are again noted. These  have enlarged.        This report was finalized on 01/16/2019 12:47 by Dr. Yousif Herrera MD.    XR Chest 1 View [[id number]] Collected:  01/16/19 1118     Updated:  01/16/19 1123    Narrative:       History:  68-year-old chest pain.     Reference:  Chest radiograph December 11, 2018. CT chest December 2018.     Findings:  Frontal chest radiograph performed.     Extensive opacity in the left lower lobe base corresponds with known  tumor. New hazy opacity is in the mid left lower lobe and lingula. Lungs  are emphysematous. Questionable small left pleural effusion. No  pneumothorax. Left subclavian Port-A-Cath, tip in SVC.          Impression:          1. New hazy opacities in the left upper lobe, questionable developing  pneumonia.  2. Known extensive left lower lobe tumor.  This report was finalized on 01/16/2019 11:20 by Dr Riley Acevedo, .              Radiology:       Objective:       Problem list:     Hospital-acquired pneumonia        Assessment/Plan:     ASSESSMENT:  1.    Hospital acquired pneumonia.   2.    Blood culture gram-positive cocci, not aureus.. Suspect contaminant.  Repeat blood culture MAHAMED today  3.    Neutrophilic leukocytosis, reactive  4.    Thrombocytosis, reactive.      CONCURRENT PROBLEMS:  1.    Neuroendocrine large cell carcinoma.              Tumor stage:  Extensive stage (cT4 cN2 M1).             Tumor burden: 5.6 x 5.7 cm mass within the left lower lobe with 1.3 cm peripheral satellite lesion in the left lower lobe, pleural based paraspinal mass in the medial left lung, 2.2 x 1.5 cm contiguous with infrahilar lymphadenopathy, and 2.5 x 3.9 cm contiguous mass posterior to the left atrium inseparable from the esophagus. 4.9 x 2.9 cm prevascular node. Effacement of the distal left main pulmonary artery along its posterior  margin related to the hilar component of the neoplasm. Bilateral adrenal metastases (5.5 x 6.1 on the left and 6.7 x 5.4 cm on the right). Interaortocaval node 1.9 x 2.3 cm. Pleural studding. Ill-defined hypodense lesion involving the posterior interpolar aspect of the left kidney suspicious for metastatic disease. L3 vertebral body epidural mass associated with extension into the ventral epidural space (1.4 x 1.0 cm extradural mass associated with effacement of the thecal sac).               Complications of tumor: Acute 2 column fracture involving L3 vertebral body involving the anterior and middle column and right pedicle with associated edema. Resultant myelopathic symptoms with radicular pain to the right leg and inability to walk.              Tumor status: Untreated.  Radiographic evidence for further disease progression              Prognosis: Poor.  2.    Vulvar/right labial mass/lesion. Primary squamous cell carcinoma.  3.    Normocytic anemia of malignancy (anemia of chronic disease). Ferritin 106, B12 of 214 (depressed) on 12/10/2018.   4.    Long-standing tobacco smoker (1/2 pack per day since age 18).  5.    Coronary artery calcifications (right coronary left anterior descending (LAD) and circumflex distributions).  6.    Poor performance status (ECOG 3-4).  8.    Poor overall prognosis.     PLAN:  1.     Re: Apprised of information   2.    Medical management.    3.    No further oncologic workup.   4.    Palliative care input noted and appreciated.  Son acknowledges that plan is to resume hospice after completion of palliative radiation.   5.    Continue palliative radiation to the vaginal lesion  6.    Care discussed with JOE Garza  7.    Care discussed with her son at the bedside

## 2019-01-18 NOTE — THERAPY EVALUATION
Acute Care - Occupational Therapy Initial Evaluation  [facility]     Patient Name: Kasey Rios  : 1950  MRN: [id number]  Today's Date: 2019  Onset of Illness/Injury or Date of Surgery: 19  Date of Referral to OT: 19  Referring Physician: Dr. Osorio    Admit Date: 2019       ICD-10-CM ICD-9-CM   1. Hospital-acquired pneumonia J18.9 486   2. Impaired mobility Z74.09 799.89   3. 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)   • Encounter for consultation   • Current every day smoker   • Cancer related pain   • Multiple lesions of metastatic malignancy (CMS/HCC)   • Mass of left lung   • BMI 26.0-26.9,adult   • Counseling regarding advanced care planning and goals of care   • Primary squamous cell carcinoma of vulva (CMS/HCC)   • Regional lymph node metastasis present (CMS/HCC)   • Hospital-acquired pneumonia     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) 2018    Added automatically from request for surgery [id number]     Past Surgical History:   Procedure Laterality Date   • APPENDECTOMY     • ECTOPIC PREGNANCY  1975    Ruptured ectopic   • KYPHOPLASTY WITH BIOPSY N/A 12/10/2018    Procedure: KYPHOPLASTY WITH BIOPSY RADIOFREQUENCY TREATMENT TO TUMOR;  Surgeon: Erwin Benitez MD;  Location: [facility] OR;  Service: Neurosurgery   • LUMBAR LAMINECTOMY N/A 12/10/2018    Procedure: LUMBAR LAMINECTOMY WITHOUT FUSION L3 REMOVAL SPINAL TUMOR;  Surgeon: Erwin Benitez MD;  Location: [facility] OR;  Service: Neurosurgery   • TOTAL ABDOMINAL HYSTERECTOMY WITH SALPINGO OOPHORECTOMY  1978    DUB, Fibroids, benign   • VENOUS ACCESS DEVICE (PORT) INSERTION N/A 2018    Procedure: INSERTION VENOUS ACCESS DEVICE;  Surgeon: Nacho  Ellie DONOHUE MD;  Location:  [facility] OR;  Service: General   • VULVA BIOPSY Bilateral 12/10/2018    Procedure: VULVA BIOPSY;  Surgeon: Alonzo Lugo MD;  Location:  [facility] OR;  Service: Obstetrics/Gynecology          OT ASSESSMENT FLOWSHEET (last 72 hours)      Occupational Therapy Evaluation     Row Name 01/18/19 1117                   OT Evaluation Time/Intention    Subjective Information  complains of;pain;weakness;fatigue;dyspnea  -TR        Document Type  evaluation  -TR        Mode of Treatment  occupational therapy  -TR        Evaluation Not Performed  --  -TR        Comment: Evaluation Not Performed  --  -TR        Patient Effort  good  -TR        Symptoms Noted During/After Treatment  other (see comments)  -TR        Comment  Pt unable to sit due to location of cancer and effects of radiation. Assist of 2 will be needed for attempts to stand.   (Significant)   -TR           General Information    Patient Profile Reviewed?  yes  -TR        Onset of Illness/Injury or Date of Surgery  01/16/19  -TR        Referring Physician  Dr. Osorio  -TR        Patient Observations  alert;cooperative;agree to therapy  -TR        Patient/Family Observations  Multiple family members present.   -TR        General Observations of Patient  Maya's, alert, oriented.   -TR        Prior Level of Function  independent:;dressing;bathing;grooming;feeding;min assist:;transfer;gait;all household mobility  -TR        Equipment Currently Used at Home  walker, rolling;hospital bed;commode, bedside  -TR        Pertinent History of Current Functional Problem  Pt presents 1/16 with SOA. Dx of pneumonia. 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   -TR        Existing Precautions/Restrictions  fall;other (see comments)  (Significant)  Unable to sit due to vaginal cancer.   -TR        Risks Reviewed  patient and family:;LOB;dizziness;increased discomfort;change in vital signs  -TR        Benefits Reviewed   patient and family:;improve function;increase independence;increase strength;increase balance;decrease pain;increase knowledge  -TR        Barriers to Rehab  medically complex;previous functional deficit;physical barrier  -TR           Relationship/Environment    Lives With  child(jessica), adult  -TR        Family Caregiver if Needed  -- Plan to d/c to brother's home.   -TR           Cognitive Assessment/Intervention- PT/OT    Affect/Mental Status (Cognitive)  WNL  -TR        Orientation Status (Cognition)  oriented x 4  -TR        Follows Commands (Cognition)  WNL  -TR           Bed Mobility Assessment/Treatment    Bed Mobility Assessment/Treatment  rolling left;rolling right;scooting/bridging  -TR        Rolling Left [place] (Bed Mobility)  supervision  -TR        Rolling Right [place] (Bed Mobility)  supervision  -TR        Scooting/Bridging [place] (Bed Mobility)  minimum assist (75% patient effort)  -TR        Assistive Device (Bed Mobility)  bed rails;head of bed elevated  -TR           Transfer Assessment/Treatment    Comment (Transfers)  Need assist of 2 to attempt transfer.   -TR           ADL Assessment/Intervention    BADL Assessment/Intervention  upper body dressing;lower body dressing;feeding;toileting  -TR           Upper Body Dressing Assessment/Training    Comment (Upper Body Dressing)  Expected level: Min A.   -TR           Lower Body Dressing Assessment/Training    Comment (Lower Body Dressing)  Expected level: Mod A.   -TR           Self-Feeding Assessment/Training    [place] Level (Feeding)  feeding skills;independent  -TR        Position (Self-Feeding)  sitting up in bed  -TR           Toileting Assessment/Training    Comment (Toileting)  Expected level: Max A.   -TR           BADL Safety/Performance    Impairments, BADL Safety/Performance  balance;endurance/activity tolerance;coordination;pain;shortness of breath;strength;trunk/postural control  -TR           General ROM     GENERAL ROM COMMENTS  B UE AROM WFL.   -TR           MMT (Manual Muscle Testing)    General MMT Comments  B UE 3+/5.   -TR           Sensory Assessment/Intervention    Sensory General Assessment  no sensation deficits identified  -TR           Positioning and Restraints    Pre-Treatment Position  in bed  -TR        Post Treatment Position  bed  -TR        In Bed  fowlers;call light within reach;encouraged to call for assist;side rails up x2  -TR           Pain Assessment    Additional Documentation  Pain Scale: FACES Pre/Post-Treatment (Group)  -TR           Pain Scale: FACES Pre/Post-Treatment    Pain: FACES Scale, Pretreatment  4-->hurts little more  -TR        Pain: FACES Scale, Post-Treatment  4-->hurts little more  -TR        Pre/Post Treatment Pain Comment  Back pain.   -TR           Wound 12/08/18 1600 Right labia other (see comments)    Wound - Properties Group Date first assessed: 12/08/18  -SK Time first assessed: 1600  -SK Present On Admission : yes  -SK Side: Right  -SK Location: labia  -SK Type: other (see comments)  -SK       Wound 12/10/18 1739 Other (See comments) back incision    Wound - Properties Group Date first assessed: 12/10/18  -HUGO Time first assessed: 1739  -HUGO Side: Other (See comments)  -HUGO Location: back  -HUGO Type: incision  -HUGO       Wound 01/16/19 1600 Bilateral lower coccyx skin tear    Wound - Properties Group Date first assessed: 01/16/19  -AW Time first assessed: 1600  -AW Present On Admission : yes  -AW Side: Bilateral  -AW Orientation: lower  -AW Location: coccyx  -AW Type: skin tear  -AW       Plan of Care Review    Plan of Care Reviewed With  patient;family  -TR           Clinical Impression (OT)    Date of Referral to OT  01/18/19  -TR        OT Diagnosis  Decreased ADL  -TR        Patient/Family Goals Statement (OT Eval)  D/C to Brother's home.   -TR        Criteria for Skilled Therapeutic Interventions Met (OT Eval)  yes;treatment indicated  -TR        Rehab Potential (OT  Eval)  good, to achieve stated therapy goals  -TR        Therapy Frequency (OT Eval)  5 times/wk  -TR        Predicted Duration of Therapy Intervention (Therapy Eval)  Until d/c home.  -TR        Care Plan Review (OT)  evaluation/treatment results reviewed;care plan/treatment goals reviewed;risks/benefits reviewed;current/potential barriers reviewed;patient/other agree to care plan  -TR        Anticipated Equipment Needs at Discharge (OT)  -- Pt reports she has all AE that insurance will provide  -TR        Anticipated Discharge Disposition (OT)  home with assist;home with home health Would benefit from hospice.   -TR           Planned OT Interventions    Planned Therapy Interventions (OT Eval)  activity tolerance training;BADL retraining;functional balance retraining;occupation/activity based interventions;patient/caregiver education/training;ROM/therapeutic exercise;strengthening exercise;transfer/mobility retraining  -TR           OT Goals    Bed Mobility Goal Selection (OT)  bed mobility, OT goal 1  -TR        Transfer Goal Selection (OT)  transfer, OT goal 1  -TR        Toileting Goal Selection (OT)  toileting, OT goal 1  -TR           Bed Mobility Goal 1 (OT)    Activity/Assistive Device (Bed Mobility Goal 1, OT)  rolling to left;rolling to right;bridging;scooting;sidelying to sit;sit to sidelying;bed rails  -TR        [place] Level/Cues Needed (Bed Mobility Goal 1, OT)  contact guard assist  -TR        Time Frame (Bed Mobility Goal 1, OT)  by discharge;long term goal (LTG)  -TR        Progress/Outcomes (Bed Mobility Goal 1, OT)  goal ongoing  -TR           Transfer Goal 1 (OT)    Activity/Assistive Device (Transfer Goal 1, OT)  commode, bedside without drop arms Sidelying <> stand.   -TR        [place] Level/Cues Needed (Transfer Goal 1, OT)  contact guard assist  -TR        Time Frame (Transfer Goal 1, OT)  by discharge;long term goal (LTG)  -TR        Progress/Outcome (Transfer Goal 1, OT)  goal  ongoing  -TR           Toileting Goal 1 (OT)    Activity/Device (Toileting Goal 1, OT)  adjust/manage clothing;perform perineal hygiene  -TR        [place] Level/Cues Needed (Toileting Goal 1, OT)  moderate assist (50-74% patient effort)  -TR        Time Frame (Toileting Goal 1, OT)  by discharge;long term goal (LTG)  -TR        Progress/Outcome (Toileting Goal 1, OT)  goal ongoing  -TR          User Key  (r) = Recorded By, (t) = Taken By, (c) = Cosigned By    Initials Name Effective Dates    SK Hodan Alberto, RN 08/02/16 -     Henrietta Falcon RN 08/02/16 -     Karmen Baker, OTR/L 06/22/15 -     Avelina Fitzpatrick RN 04/03/17 -          Occupational Therapy Education     Title: PT OT SLP Therapies (In Progress)     Topic: Occupational Therapy (In Progress)     Point: ADL training (Done)     Description: Instruct learner(s) on proper safety adaptation and remediation techniques during self care or transfers.   Instruct in proper use of assistive devices.    Learning Progress Summary           Patient Acceptance, E,D, VU,NR by TR at 1/18/2019  3:27 PM    Comment:  Education provided on purpose of OT eval, impairments found, need for continued intervention and d/c planning.   Family Acceptance, E,D, VU,NR by TR at 1/18/2019  3:27 PM    Comment:  Education provided on purpose of OT eval, impairments found, need for continued intervention and d/c planning.                   Point: Precautions (Done)     Description: Instruct learner(s) on prescribed precautions during self-care and functional transfers.    Learning Progress Summary           Patient Acceptance, E,D, VU,NR by TR at 1/18/2019  3:27 PM    Comment:  Education provided on purpose of OT eval, impairments found, need for continued intervention and d/c planning.   Family Acceptance, E,D, VU,NR by TR at 1/18/2019  3:27 PM    Comment:  Education provided on purpose of OT eval, impairments found, need for continued intervention and d/c  planning.                               User Key     Initials Effective Dates Name Provider Type Discipline    TR 06/22/15 -  Karmen Young, OTR/L Occupational Therapist OT                  OT Recommendation and Plan  Outcome Summary/Treatment Plan (OT)  Anticipated Equipment Needs at Discharge (OT): (Pt reports she has all AE that insurance will provide)  Anticipated Discharge Disposition (OT): home with assist, home with home health(Would benefit from hospice. )  Planned Therapy Interventions (OT Eval): activity tolerance training, BADL retraining, functional balance retraining, occupation/activity based interventions, patient/caregiver education/training, ROM/therapeutic exercise, strengthening exercise, transfer/mobility retraining  Therapy Frequency (OT Eval): 5 times/wk  Plan of Care Review  Plan of Care Reviewed With: patient  Plan of Care Reviewed With: patient  Outcome Summary: OT Eval completed. Pt reports she plans to d/c to brothers home with home health and begin hospice care when she finishes radiation. 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 She is very weak and will require assist of 2 to get out of bed and attempt standing. Min A for UB ADL. Mod to Max A for LB ADL. Pt reports owning all AE that her insurance coverage will provide. OT will continue working with pt focusing on self care techniques and ADL transfers.     Outcome Measures     Row Name 01/18/19 1500 01/18/19 1300          How much help from another person do you currently need...    Turning from your back to your side while in flat bed without using bedrails?  --  3  -SB     Moving from lying on back to sitting on the side of a flat bed without bedrails?  --  3  -SB     Moving to and from a bed to a chair (including a wheelchair)?  --  1  -SB     Standing up from a chair using your arms (e.g., wheelchair, bedside chair)?  --  1  -SB     Climbing 3-5 steps with a railing?  --  1  -SB     To walk in  hospital room?  --  1  -SB     AM-PAC 6 Clicks Score  --  10  -SB        How much help from another is currently needed...    Putting on and taking off regular lower body clothing?  2  -TR  --     Bathing (including washing, rinsing, and drying)  2  -TR  --     Toileting (which includes using toilet bed pan or urinal)  2  -TR  --     Putting on and taking off regular upper body clothing  3  -TR  --     Taking care of personal grooming (such as brushing teeth)  4  -TR  --     Eating meals  4  -TR  --     Score  17  -TR  --        Functional Assessment    Outcome Measure Options  AM-PAC 6 Clicks Daily Activity (OT)  -TR  AM-PAC 6 Clicks Basic Mobility (PT)  -SB       User Key  (r) = Recorded By, (t) = Taken By, (c) = Cosigned By    Initials Name Provider Type    Karmen Baker OTR/L Occupational Therapist    SB Maria Antonia Cho, PT Physical Therapist          Time Calculation:   Time Calculation- OT     Row Name 01/18/19 1532             Time Calculation- OT    OT Start Time  1430  -TR      OT Stop Time  1524  -TR      OT Time Calculation (min)  54 min  -TR      OT Received On  01/18/19  -TR      OT Goal Re-Cert Due Date  01/28/19  -TR        User Key  (r) = Recorded By, (t) = Taken By, (c) = Cosigned By    Initials Name Provider Type    Karmen Baker OTR/L Occupational Therapist        Therapy Suggested Charges     Code   Minutes Charges    None           Therapy Charges for Today     Code Description Service Date Service Provider Modifiers Qty    [id number] HC OT EVAL HIGH COMPLEXITY 4 1/18/2019 Karmen Young OTR/L GO 1               Karmen SAUNDERS. MERY Young/MARINA  1/18/2019

## 2019-01-19 LAB
ALBUMIN SERPL-MCNC: 2.4 G/DL (ref 3.5–5)
ALBUMIN/GLOB SERPL: 0.8 G/DL (ref 1.1–2.5)
ALP SERPL-CCNC: 89 U/L (ref 24–120)
ALT SERPL W P-5'-P-CCNC: 15 U/L (ref 0–54)
ANION GAP SERPL CALCULATED.3IONS-SCNC: 10 MMOL/L (ref 4–13)
AST SERPL-CCNC: 16 U/L (ref 7–45)
BACTERIA SPEC AEROBE CULT: ABNORMAL
BASOPHILS # BLD AUTO: 0.03 10*3/MM3 (ref 0–0.2)
BASOPHILS NFR BLD AUTO: 0.2 % (ref 0–2)
BILIRUB SERPL-MCNC: 0.2 MG/DL (ref 0.1–1)
BUN BLD-MCNC: 13 MG/DL (ref 5–21)
BUN/CREAT SERPL: 44.8 (ref 7–25)
CALCIUM SPEC-SCNC: 7.4 MG/DL (ref 8.4–10.4)
CHLORIDE SERPL-SCNC: 98 MMOL/L (ref 98–110)
CO2 SERPL-SCNC: 27 MMOL/L (ref 24–31)
CREAT BLD-MCNC: 0.29 MG/DL (ref 0.5–1.4)
DEPRECATED RDW RBC AUTO: 48.5 FL (ref 40–54)
EOSINOPHIL # BLD AUTO: 0.12 10*3/MM3 (ref 0–0.7)
EOSINOPHIL NFR BLD AUTO: 0.9 % (ref 0–4)
ERYTHROCYTE [DISTWIDTH] IN BLOOD BY AUTOMATED COUNT: 15.2 % (ref 12–15)
GFR SERPL CREATININE-BSD FRML MDRD: >150 ML/MIN/1.73
GLOBULIN UR ELPH-MCNC: 3.1 GM/DL
GLUCOSE BLD-MCNC: 96 MG/DL (ref 70–100)
GRAM STN SPEC: ABNORMAL
HCT VFR BLD AUTO: 25.9 % (ref 37–47)
HGB BLD-MCNC: 8.4 G/DL (ref 12–16)
IMM GRANULOCYTES # BLD AUTO: 0.24 10*3/MM3 (ref 0–0.03)
IMM GRANULOCYTES NFR BLD AUTO: 1.8 % (ref 0–5)
ISOLATED FROM: ABNORMAL
LYMPHOCYTES # BLD AUTO: 0.71 10*3/MM3 (ref 0.72–4.86)
LYMPHOCYTES NFR BLD AUTO: 5.2 % (ref 15–45)
MCH RBC QN AUTO: 28.1 PG (ref 28–32)
MCHC RBC AUTO-ENTMCNC: 32.4 G/DL (ref 33–36)
MCV RBC AUTO: 86.6 FL (ref 82–98)
MONOCYTES # BLD AUTO: 0.94 10*3/MM3 (ref 0.19–1.3)
MONOCYTES NFR BLD AUTO: 6.9 % (ref 4–12)
NEUTROPHILS # BLD AUTO: 11.57 10*3/MM3 (ref 1.87–8.4)
NEUTROPHILS NFR BLD AUTO: 85 % (ref 39–78)
NRBC BLD AUTO-RTO: 0 /100 WBC (ref 0–0)
PLATELET # BLD AUTO: 555 10*3/MM3 (ref 130–400)
PMV BLD AUTO: 9.7 FL (ref 6–12)
POTASSIUM BLD-SCNC: 4.1 MMOL/L (ref 3.5–5.3)
PROT SERPL-MCNC: 5.5 G/DL (ref 6.3–8.7)
RBC # BLD AUTO: 2.99 10*6/MM3 (ref 4.2–5.4)
SODIUM BLD-SCNC: 135 MMOL/L (ref 135–145)
WBC NRBC COR # BLD: 13.61 10*3/MM3 (ref 4.8–10.8)

## 2019-01-19 PROCEDURE — 25010000002 VANCOMYCIN 10 G RECONSTITUTED SOLUTION: Performed by: NURSE PRACTITIONER

## 2019-01-19 PROCEDURE — 85025 COMPLETE CBC W/AUTO DIFF WBC: CPT | Performed by: FAMILY MEDICINE

## 2019-01-19 PROCEDURE — 80053 COMPREHEN METABOLIC PANEL: CPT | Performed by: FAMILY MEDICINE

## 2019-01-19 PROCEDURE — 25010000002 CEFEPIME PER 500 MG: Performed by: FAMILY MEDICINE

## 2019-01-19 RX ORDER — FENTANYL 50 UG/H
1 PATCH TRANSDERMAL
Status: DISCONTINUED | OUTPATIENT
Start: 2019-01-19 | End: 2019-01-20 | Stop reason: HOSPADM

## 2019-01-19 RX ADMIN — GUAIFENESIN 1200 MG: 600 TABLET, EXTENDED RELEASE ORAL at 09:10

## 2019-01-19 RX ADMIN — Medication 81 MG: at 09:10

## 2019-01-19 RX ADMIN — OXYCODONE HYDROCHLORIDE AND ACETAMINOPHEN 2 TABLET: 10; 325 TABLET ORAL at 20:58

## 2019-01-19 RX ADMIN — OXYCODONE HYDROCHLORIDE AND ACETAMINOPHEN 2 TABLET: 10; 325 TABLET ORAL at 07:49

## 2019-01-19 RX ADMIN — GUAIFENESIN 1200 MG: 600 TABLET, EXTENDED RELEASE ORAL at 20:58

## 2019-01-19 RX ADMIN — VANCOMYCIN HYDROCHLORIDE 750 MG: 10 INJECTION, POWDER, LYOPHILIZED, FOR SOLUTION INTRAVENOUS at 20:58

## 2019-01-19 RX ADMIN — FENTANYL 1 PATCH: 50 PATCH, EXTENDED RELEASE TRANSDERMAL at 11:21

## 2019-01-19 RX ADMIN — ALPRAZOLAM 0.5 MG: 0.5 TABLET ORAL at 20:58

## 2019-01-19 RX ADMIN — PANTOPRAZOLE SODIUM 20 MG: 20 TABLET, DELAYED RELEASE ORAL at 09:10

## 2019-01-19 RX ADMIN — MEGESTROL ACETATE 400 MG: 40 SUSPENSION ORAL at 09:10

## 2019-01-19 RX ADMIN — POLYETHYLENE GLYCOL 3350 17 G: 17 POWDER, FOR SOLUTION ORAL at 09:10

## 2019-01-19 RX ADMIN — VANCOMYCIN HYDROCHLORIDE 750 MG: 10 INJECTION, POWDER, LYOPHILIZED, FOR SOLUTION INTRAVENOUS at 11:21

## 2019-01-19 RX ADMIN — CEFEPIME 2 G: 2 INJECTION, POWDER, FOR SOLUTION INTRAVENOUS at 04:11

## 2019-01-19 NOTE — PLAN OF CARE
Problem: Patient Care Overview  Goal: Plan of Care Review   01/19/19 8871   Coping/Psychosocial   Plan of Care Reviewed With patient   Plan of Care Review   Progress improving   OTHER   Outcome Summary Fentanyl patch 50 mg in place. PRN percocet given. Pt ambulated 50 ft and took at shower. Pain and SOA increased with activity. SCDs are in place. Pt c/o pain in the perineal area. VSS. Safety maintained. Continue to monitor.     Goal: Individualization and Mutuality  Outcome: Ongoing (interventions implemented as appropriate)      Problem: Fall Risk (Adult)  Goal: Identify Related Risk Factors and Signs and Symptoms  Outcome: Ongoing (interventions implemented as appropriate)    Goal: Absence of Fall  Outcome: Ongoing (interventions implemented as appropriate)      Problem: Skin Injury Risk (Adult)  Goal: Identify Related Risk Factors and Signs and Symptoms  Outcome: Ongoing (interventions implemented as appropriate)    Goal: Skin Health and Integrity  Outcome: Ongoing (interventions implemented as appropriate)      Problem: Pneumonia (Adult)  Goal: Signs and Symptoms of Listed Potential Problems Will be Absent, Minimized or Managed (Pneumonia)  Outcome: Ongoing (interventions implemented as appropriate)      Problem: Pain, Chronic (Adult)  Goal: Acceptable Pain/Comfort Level and Functional Ability  Outcome: Ongoing (interventions implemented as appropriate)

## 2019-01-19 NOTE — PLAN OF CARE
Problem: Patient Care Overview  Goal: Plan of Care Review  Outcome: Ongoing (interventions implemented as appropriate)   01/19/19 0622   Coping/Psychosocial   Plan of Care Reviewed With patient   Plan of Care Review   Progress no change   OTHER   Outcome Summary PRN pain meds given this shift as ordered. Pt. continues to c/o perineal pain. Weakness is ongoing. Pt. states she does not want to ambulate or get up in chair this shift. Pt. states she is unable to sit r/t effects of radiation. SCDs in place. No s/s distress this shift. Safety maintained. Continue to monitor.     Goal: Individualization and Mutuality  Outcome: Ongoing (interventions implemented as appropriate)   01/19/19 0622   Individualization   Patient Specific Goals (Include Timeframe) Adequate pain control.   Patient Specific Interventions PRN pain meds given as ordered.     Goal: Interprofessional Rounds/Family Conf  Outcome: Ongoing (interventions implemented as appropriate)   01/19/19 0622   Interdisciplinary Rounds/Family Conf   Participants nursing;patient       Problem: Fall Risk (Adult)  Goal: Identify Related Risk Factors and Signs and Symptoms  Outcome: Ongoing (interventions implemented as appropriate)   01/19/19 0622   Fall Risk (Adult)   Related Risk Factors (Fall Risk) gait/mobility problems;fatigue/slow reaction;environment unfamiliar   Signs and Symptoms (Fall Risk) presence of risk factors     Goal: Absence of Fall  Outcome: Ongoing (interventions implemented as appropriate)   01/19/19 0622   Fall Risk (Adult)   Absence of Fall making progress toward outcome       Problem: Skin Injury Risk (Adult)  Goal: Identify Related Risk Factors and Signs and Symptoms  Outcome: Ongoing (interventions implemented as appropriate)   01/19/19 0622   Skin Injury Risk (Adult)   Related Risk Factors (Skin Injury Risk) infection;mobility impaired     Goal: Skin Health and Integrity  Outcome: Ongoing (interventions implemented as appropriate)   01/19/19  0622   Skin Injury Risk (Adult)   Skin Health and Integrity making progress toward outcome       Problem: Pneumonia (Adult)  Goal: Signs and Symptoms of Listed Potential Problems Will be Absent, Minimized or Managed (Pneumonia)  Outcome: Ongoing (interventions implemented as appropriate)   01/19/19 0622   Goal/Outcome Evaluation   Problems Assessed (Pneumonia) all   Problems Present (Pneumonia) infection progression       Problem: Pain, Chronic (Adult)  Goal: Acceptable Pain/Comfort Level and Functional Ability  Outcome: Ongoing (interventions implemented as appropriate)   01/19/19 0622   Pain, Chronic (Adult)   Acceptable Pain/Comfort Level and Functional Ability making progress toward outcome

## 2019-01-19 NOTE — PROGRESS NOTES
[facility] Medicine Services  INPATIENT PROGRESS NOTE    Length of Stay: 3  Date of Admission: 1/16/2019  Primary Care Physician: Palomo Valenzuela DO    Subjective   Chief Complaint: follow up pneumonia, back pain  HPI   Lying in bed.  Family in room.  No oxygen in use.  No sputum production.  No fever or chills.  Reports worsening pain today.  Fentanyl increased.  Sputum culture positive for strep pneumonia and MRSA.  Antibiotics changed.  She was not able to participate with physical therapy yesterday.    Review of Systems   Constitutional: Positive for fatigue.   HENT: Negative for congestion and trouble swallowing.    Eyes: Negative for photophobia and visual disturbance.   Respiratory: Positive for cough and shortness of breath. Negative for wheezing.    Cardiovascular: Negative for chest pain, palpitations and leg swelling.   Gastrointestinal: Negative for constipation, diarrhea, nausea and vomiting.   Endocrine: Negative for cold intolerance, heat intolerance and polyuria.   Genitourinary: Negative for dysuria and urgency.   Musculoskeletal: Positive for gait problem.   Skin: Negative for wound.   Allergic/Immunologic: Negative for immunocompromised state.   Neurological: Positive for weakness.   Hematological: Negative for adenopathy. Does not bruise/bleed easily.   Psychiatric/Behavioral: Negative for agitation, behavioral problems and confusion.     All pertinent negatives and positives are as above. All other systems have been reviewed and are negative unless otherwise stated.     Objective    Temp:  [97.4 °F (36.3 °C)-98.3 °F (36.8 °C)] 98.1 °F (36.7 °C)  Heart Rate:  [] 120  Resp:  [14-18] 18  BP: (118-142)/(67-86) 127/86  Physical Exam    Constitutional: She is oriented to person, place, and time. She appears well-developed and well-nourished.   HENT:   Head: Normocephalic and atraumatic.   Eyes: EOM are normal. Pupils are equal, round, and reactive to  light.   Neck: Normal range of motion. Neck supple.   Cardiovascular: Normal rate, regular rhythm, normal heart sounds and intact distal pulses. Exam reveals no gallop and no friction rub.   No murmur heard.  Pulmonary/Chest: Effort normal and breath sounds normal. She has no wheezes. She has no rales.   Decreased breath sounds. Lungs clear. No oxygen in use.   Abdominal: Soft. Bowel sounds are normal.   Genitourinary:   Genitourinary Comments: Voiding.   Musculoskeletal: Normal range of motion. She exhibits no edema or deformity.   Neurological: She is alert and oriented to person, place, and time.   Skin: Skin is warm and dry.   Psychiatric: She has a normal mood and affect. Her behavior is normal. Judgment and thought content normal.      Results Review:  I have reviewed the labs, radiology results, and diagnostic studies.    Laboratory Data:   Results from last 7 days   Lab Units 01/19/19  0421 01/18/19  0528 01/17/19 0447   WBC 10*3/mm3 13.61* 15.24* 13.12*   HEMOGLOBIN g/dL 8.4* 8.5* 9.8*   HEMATOCRIT % 25.9* 25.8* 30.0*   PLATELETS 10*3/mm3 555* 532* 490*        Results from last 7 days   Lab Units 01/19/19  0421 01/18/19  0528 01/17/19  0447   SODIUM mmol/L 135 137 132*   POTASSIUM mmol/L 4.1 3.8 4.4   CHLORIDE mmol/L 98 100 94*   CO2 mmol/L 27.0 27.0 26.0   BUN mg/dL 13 15 15   CREATININE mg/dL 0.29* 0.37* 0.35*   CALCIUM mg/dL 7.4* 7.7* 7.7*   BILIRUBIN mg/dL 0.2 0.2 0.3   ALK PHOS U/L 89 103 127*   ALT (SGPT) U/L 15 17 19   AST (SGOT) U/L 16 17 22   GLUCOSE mg/dL 96 123* 220*     Blood Culture   Date Value Ref Range Status   01/18/2019 No growth at 24 hours  Preliminary   01/16/2019 Staphylococcus epidermidis (A)  Final   01/16/2019 Staphylococcus epidermidis (A)  Preliminary     Comment:     This organism is the same as the organism isolated from Blood Culture [id number], collected 1/16/2019 1357 from Left hand.     Respiratory Culture   Date Value Ref Range Status   01/16/2019 Light growth (2+)  Streptococcus pneumoniae (A)  Preliminary   01/16/2019 Light growth (2+) Staphylococcus aureus, MRSA (A)  Preliminary     Comment:       Methicillin resistant Staphylococcus aureus, Patient may be an isolation risk.   01/16/2019 Light growth (2+) Normal Respiratory Sandra  Preliminary     Imaging Results (all)     Procedure Component Value Units Date/Time    CT Angiogram Chest With Contrast [[id number]] Collected:  01/16/19 1236     Updated:  01/16/19 1250    Narrative:       CT ANGIOGRAM CHEST W CONTRAST- 1/16/2019 12:20 PM CST      HISTORY: Shortness of air and chest pain      COMPARISON: Radiation planning CT 12/31/2018 and CT of the chest  12/8/2018.      DOSE LENGTH PRODUCT: 405 mGy cm. Automated exposure control was also  utilized to decrease patient radiation dose.     TECHNIQUE: Helical tomographic images of the chest were obtained after  the administration of intravenous contrast following angiogram protocol.  Additionally, 3D and multiplanar reformatted images were provided.        FINDINGS:    Pulmonary arteries: There is no evidence of pulmonary embolus. The  pulmonary arteries are slightly enlarged, consistent with pulmonary  arterial hypertension     Aorta and great vessels: There is atherosclerosis in the aorta without  aneurysm or dissection. There is atherosclerosis in the great vessels.  The patient's port extends into the SVC.     Visualized neck base: The imaged portion of the base of the neck appears  unremarkable.      Lungs: There is a large mass in the LEFT lower lobe. Surrounding  consolidation is present. Areas of cavitation are seen within the mass.  These are new since the previous study. Moderate emphysematous changes  are present in the lungs.       Heart: The heart is normal in size. Coronary artery calcifications are  visualized. Trace pericardial fluid is present.      Mediastinum and lymph nodes: Mediastinal, LEFT hilar, and subcarinal  lymphadenopathy is unchanged. There is a new  LEFT axillary lymph node  that measures 1.3 cm in diameter.      Skeletal and soft tissues: A metastatic lesion is seen in the inferior  glenoid process of the LEFT scapula. No other destructive lesions are  seen in the visualized bones.     Upper abdomen: Enlarging adrenal metastases are seen bilaterally. These  measure up to 6.7 cm.       Impression:       1. No evidence of pulmonary embolus.  2. LEFT lower lobe lung mass with surrounding pneumonia. The pneumonia  is new since the previous study.  3. Mediastinal and LEFT hilar lymphadenopathy is again noted.  4. There is a new enlarged lymph node in the LEFT axilla that measures  1.3 cm in short axis.  5. New destructive changes in the inferior glenoid on the LEFT. This is  consistent with metastatic disease.  6. Large metastatic masses in the adrenal glands are again noted. These  have enlarged.        This report was finalized on 01/16/2019 12:47 by Dr. Yousif Herrera MD.    XR Chest 1 View [[id number]] Collected:  01/16/19 1118     Updated:  01/16/19 1123    Narrative:       History:  68-year-old chest pain.     Reference:  Chest radiograph December 11, 2018. CT chest December 2018.     Findings:  Frontal chest radiograph performed.     Extensive opacity in the left lower lobe base corresponds with known  tumor. New hazy opacity is in the mid left lower lobe and lingula. Lungs  are emphysematous. Questionable small left pleural effusion. No  pneumothorax. Left subclavian Port-A-Cath, tip in SVC.          Impression:          1. New hazy opacities in the left upper lobe, questionable developing  pneumonia.  2. Known extensive left lower lobe tumor.  This report was finalized on 01/16/2019 11:20 by Dr Riley Acevedo, .          Intake/Output    Intake/Output Summary (Last 24 hours) at 1/19/2019 1141  Last data filed at 1/19/2019 0900  Gross per 24 hour   Intake 700 ml   Output 1650 ml   Net -950 ml       Scheduled Meds    aspirin 81 mg Oral Daily   fentaNYL 1 patch  Transdermal Q72H   guaiFENesin 1,200 mg Oral Q12H   megestrol 400 mg Oral Daily   pantoprazole 20 mg Oral Daily   polyethylene glycol 17 g Oral Daily   vancomycin 750 mg Intravenous Q12H       I have reviewed the patient current medications.     Assessment/Plan     Assessment:  1.  Simple sepsis  2.  Hospital-acquired pneumonia, left lower lobe, strep pneumonia,  3.  Widespread metastatic cancer  4.  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  Vulvar, right labial mass lesion, primary squamous cell carcinoma  6.  Normocytic anemia likely of malignancy, anemia chronic disease  7.  Thrombocytosis suspect reactive  8.  Long-standing tobacco use, recently quit  9.  Essential hypertension  10.  Leukocytosis, resolving  11.  Anemia, improving     Plan:  1.  Received aspirin, normal saline fluid bolus, Zosyn in ER  2.  Cefepime IV day 3. Discontinued 1/19. Vancomycin discontinued 1/18  3.  Sputum culture strep pneumonia, MRSA.  Will discontinue cefepime and resume vancomycin.  Await final culture sensitivity and then switch to oral antibiotics.  4.  Blood culture Staphylococcus epidermidis, contaminant.  Repeat blood culture 1/18 no growth at 24 hours  5.  Legionella negative, strep pneumonia negative urine  6.  Mucinex 1200 mg twice daily  7.  Duo nebs every 4 hours as needed  8.  Oncology, Dr. Velasco on following.  9.  Palliative care consulted 1/17.  Patient [facility].  Plans to start hospice after completes radiation.  10.  Next radiation 1/21  11.  Phentanyl increased to 15 µg patch  12.  Lovenox for deep vein thrombosis prophylaxis  13.  Physical therapy consulted.  Patient was not able to participate with physical therapy.  Unable to sit due to cancer location and pain level.  Unable to perform balance activities or attempt to stand.    The above documentation resulted from a face-to-face encounter by karine ROCA, [facility].    Discharge Planning: I expect  patient to be discharged to home with [facility] in 1-2 days.    Beatriz Durand, ABELINO   01/19/19   11:41 AM

## 2019-01-20 VITALS
TEMPERATURE: 98 F | DIASTOLIC BLOOD PRESSURE: 62 MMHG | SYSTOLIC BLOOD PRESSURE: 113 MMHG | WEIGHT: 112.8 LBS | HEART RATE: 119 BPM | OXYGEN SATURATION: 95 % | BODY MASS INDEX: 22.15 KG/M2 | RESPIRATION RATE: 16 BRPM | HEIGHT: 60 IN

## 2019-01-20 LAB
ALBUMIN SERPL-MCNC: 2.3 G/DL (ref 3.5–5)
ALBUMIN/GLOB SERPL: 0.7 G/DL (ref 1.1–2.5)
ALP SERPL-CCNC: 88 U/L (ref 24–120)
ALT SERPL W P-5'-P-CCNC: 18 U/L (ref 0–54)
ANION GAP SERPL CALCULATED.3IONS-SCNC: 9 MMOL/L (ref 4–13)
AST SERPL-CCNC: 16 U/L (ref 7–45)
B-LACTAMASE USUAL SUSC ISLT: NEGATIVE
B-LACTAMASE USUAL SUSC ISLT: POSITIVE
BACTERIA SPEC RESP CULT: ABNORMAL
BASOPHILS # BLD AUTO: 0.03 10*3/MM3 (ref 0–0.2)
BASOPHILS NFR BLD AUTO: 0.2 % (ref 0–2)
BILIRUB SERPL-MCNC: 0.2 MG/DL (ref 0.1–1)
BUN BLD-MCNC: 9 MG/DL (ref 5–21)
BUN/CREAT SERPL: 34.6 (ref 7–25)
CALCIUM SPEC-SCNC: 7.6 MG/DL (ref 8.4–10.4)
CHLORIDE SERPL-SCNC: 99 MMOL/L (ref 98–110)
CO2 SERPL-SCNC: 28 MMOL/L (ref 24–31)
CREAT BLD-MCNC: 0.26 MG/DL (ref 0.5–1.4)
DEPRECATED RDW RBC AUTO: 45.9 FL (ref 40–54)
EOSINOPHIL # BLD AUTO: 0.26 10*3/MM3 (ref 0–0.7)
EOSINOPHIL NFR BLD AUTO: 2 % (ref 0–4)
ERYTHROCYTE [DISTWIDTH] IN BLOOD BY AUTOMATED COUNT: 15.2 % (ref 12–15)
GFR SERPL CREATININE-BSD FRML MDRD: >150 ML/MIN/1.73
GLOBULIN UR ELPH-MCNC: 3.2 GM/DL
GLUCOSE BLD-MCNC: 101 MG/DL (ref 70–100)
GRAM STN SPEC: ABNORMAL
HCT VFR BLD AUTO: 24.9 % (ref 37–47)
HGB BLD-MCNC: 8.2 G/DL (ref 12–16)
IMM GRANULOCYTES # BLD AUTO: 0.32 10*3/MM3 (ref 0–0.03)
IMM GRANULOCYTES NFR BLD AUTO: 2.5 % (ref 0–5)
LYMPHOCYTES # BLD AUTO: 0.84 10*3/MM3 (ref 0.72–4.86)
LYMPHOCYTES NFR BLD AUTO: 6.4 % (ref 15–45)
MCH RBC QN AUTO: 27.6 PG (ref 28–32)
MCHC RBC AUTO-ENTMCNC: 32.9 G/DL (ref 33–36)
MCV RBC AUTO: 83.8 FL (ref 82–98)
MONOCYTES # BLD AUTO: 0.88 10*3/MM3 (ref 0.19–1.3)
MONOCYTES NFR BLD AUTO: 6.7 % (ref 4–12)
NEUTROPHILS # BLD AUTO: 10.72 10*3/MM3 (ref 1.87–8.4)
NEUTROPHILS NFR BLD AUTO: 82.2 % (ref 39–78)
NRBC BLD AUTO-RTO: 0 /100 WBC (ref 0–0)
PLATELET # BLD AUTO: 576 10*3/MM3 (ref 130–400)
PMV BLD AUTO: 9.7 FL (ref 6–12)
POTASSIUM BLD-SCNC: 3.8 MMOL/L (ref 3.5–5.3)
PROT SERPL-MCNC: 5.5 G/DL (ref 6.3–8.7)
RBC # BLD AUTO: 2.97 10*6/MM3 (ref 4.2–5.4)
SODIUM BLD-SCNC: 136 MMOL/L (ref 135–145)
WBC NRBC COR # BLD: 13.05 10*3/MM3 (ref 4.8–10.8)

## 2019-01-20 PROCEDURE — 80053 COMPREHEN METABOLIC PANEL: CPT | Performed by: FAMILY MEDICINE

## 2019-01-20 PROCEDURE — 85025 COMPLETE CBC W/AUTO DIFF WBC: CPT | Performed by: FAMILY MEDICINE

## 2019-01-20 RX ORDER — FLUOXETINE HYDROCHLORIDE 20 MG/1
20 CAPSULE ORAL DAILY
Qty: 30 CAPSULE | Refills: 0 | Status: SHIPPED | OUTPATIENT
Start: 2019-01-20 | End: 2019-01-30 | Stop reason: SDUPTHER

## 2019-01-20 RX ORDER — CIPROFLOXACIN 500 MG/1
500 TABLET, FILM COATED ORAL EVERY 12 HOURS SCHEDULED
Status: DISCONTINUED | OUTPATIENT
Start: 2019-01-20 | End: 2019-01-20 | Stop reason: CLARIF

## 2019-01-20 RX ORDER — FENTANYL 25 UG/H
PATCH TRANSDERMAL
Start: 2019-01-20

## 2019-01-20 RX ORDER — DOXYCYCLINE 100 MG/1
100 TABLET ORAL EVERY 12 HOURS SCHEDULED
Qty: 13 TABLET | Refills: 0 | Status: SHIPPED | OUTPATIENT
Start: 2019-01-20 | End: 2019-01-27

## 2019-01-20 RX ORDER — GUAIFENESIN 600 MG/1
1200 TABLET, EXTENDED RELEASE ORAL EVERY 12 HOURS SCHEDULED
Qty: 20 TABLET | Refills: 0 | Status: SHIPPED | OUTPATIENT
Start: 2019-01-20 | End: 2019-01-29

## 2019-01-20 RX ORDER — MEGESTROL ACETATE 40 MG/ML
400 SUSPENSION ORAL DAILY
Qty: 300 ML | Refills: 0 | Status: SHIPPED | OUTPATIENT
Start: 2019-01-21

## 2019-01-20 RX ORDER — CIPROFLOXACIN 500 MG/1
500 TABLET, FILM COATED ORAL EVERY 12 HOURS SCHEDULED
Qty: 13 TABLET | Refills: 0 | Status: SHIPPED | OUTPATIENT
Start: 2019-01-20 | End: 2019-01-27

## 2019-01-20 RX ORDER — LEVOFLOXACIN 750 MG/1
750 TABLET ORAL EVERY 24 HOURS
Status: DISCONTINUED | OUTPATIENT
Start: 2019-01-20 | End: 2019-01-20 | Stop reason: CLARIF

## 2019-01-20 RX ORDER — DOXYCYCLINE 100 MG/1
100 TABLET ORAL EVERY 12 HOURS SCHEDULED
Status: DISCONTINUED | OUTPATIENT
Start: 2019-01-20 | End: 2019-01-20 | Stop reason: HOSPADM

## 2019-01-20 RX ORDER — SACCHAROMYCES BOULARDII 250 MG
250 CAPSULE ORAL 2 TIMES DAILY
Qty: 20 CAPSULE | Refills: 0 | Status: SHIPPED | OUTPATIENT
Start: 2019-01-20

## 2019-01-20 RX ORDER — FLUOXETINE HYDROCHLORIDE 20 MG/1
20 CAPSULE ORAL DAILY
Status: DISCONTINUED | OUTPATIENT
Start: 2019-01-20 | End: 2019-01-20 | Stop reason: HOSPADM

## 2019-01-20 RX ORDER — CIPROFLOXACIN 500 MG/1
500 TABLET, FILM COATED ORAL EVERY 12 HOURS SCHEDULED
Status: DISCONTINUED | OUTPATIENT
Start: 2019-01-20 | End: 2019-01-20 | Stop reason: HOSPADM

## 2019-01-20 RX ADMIN — GUAIFENESIN 1200 MG: 600 TABLET, EXTENDED RELEASE ORAL at 08:47

## 2019-01-20 RX ADMIN — CIPROFLOXACIN 500 MG: 500 TABLET, FILM COATED ORAL at 10:20

## 2019-01-20 RX ADMIN — DOXYCYCLINE 100 MG: 100 TABLET ORAL at 10:20

## 2019-01-20 RX ADMIN — MEGESTROL ACETATE 400 MG: 40 SUSPENSION ORAL at 08:47

## 2019-01-20 RX ADMIN — OXYCODONE HYDROCHLORIDE AND ACETAMINOPHEN 2 TABLET: 10; 325 TABLET ORAL at 04:02

## 2019-01-20 RX ADMIN — Medication 81 MG: at 08:46

## 2019-01-20 RX ADMIN — PANTOPRAZOLE SODIUM 20 MG: 20 TABLET, DELAYED RELEASE ORAL at 08:47

## 2019-01-20 RX ADMIN — SODIUM CHLORIDE, PRESERVATIVE FREE 10 ML: 5 INJECTION INTRAVENOUS at 08:47

## 2019-01-20 RX ADMIN — FLUOXETINE 20 MG: 20 CAPSULE ORAL at 10:20

## 2019-01-20 RX ADMIN — OXYCODONE HYDROCHLORIDE AND ACETAMINOPHEN 2 TABLET: 10; 325 TABLET ORAL at 10:20

## 2019-01-20 NOTE — PLAN OF CARE
Problem: Patient Care Overview  Goal: Plan of Care Review  Outcome: Ongoing (interventions implemented as appropriate)   01/20/19 0445   Coping/Psychosocial   Plan of Care Reviewed With patient   Plan of Care Review   Progress  01/20/19 0445   Coping/Psychosocial   Plan of Care Reviewed With patient   Plan of Care Review   Progress improving   OTHER   Outcome Summary Pt. states pain has improved since Fentanyl Patch dosage increased. Pt. states improved appetite. Pt. up to bathroom this shift with assist x1 and walker. SOA noted with exertion. VSS. Safety maintained. Continue to monitor.   improving     Goal: Individualization and Mutuality  Outcome: Ongoing (interventions implemented as appropriate)   01/20/19 0445   Individualization   Patient Specific Goals (Include Timeframe) Pt. wants to be discharged home soon.     Goal: Interprofessional Rounds/Family Conf  Outcome: Ongoing (interventions implemented as appropriate)   01/20/19 0445   Interdisciplinary Rounds/Family Conf   Participants nursing;patient       Problem: Fall Risk (Adult)  Goal: Identify Related Risk Factors and Signs and Symptoms  Outcome: Ongoing (interventions implemented as appropriate)   01/20/19 0445   Fall Risk (Adult)   Related Risk Factors (Fall Risk) fatigue/slow reaction;fear of falling;gait/mobility problems;environment unfamiliar;slippery/uneven surfaces   Signs and Symptoms (Fall Risk) presence of risk factors     Goal: Absence of Fall  Outcome: Ongoing (interventions implemented as appropriate)   01/20/19 0445   Fall Risk (Adult)   Absence of Fall making progress toward outcome       Problem: Skin Injury Risk (Adult)  Goal: Identify Related Risk Factors and Signs and Symptoms  Outcome: Ongoing (interventions implemented as appropriate)   01/20/19 0445   Skin Injury Risk (Adult)   Related Risk Factors (Skin Injury Risk) hospitalization prolonged;moisture;mobility impaired     Goal: Skin Health and Integrity  Outcome: Ongoing  (interventions implemented as appropriate)   01/20/19 0445   Skin Injury Risk (Adult)   Skin Health and Integrity making progress toward outcome       Problem: Pneumonia (Adult)  Goal: Signs and Symptoms of Listed Potential Problems Will be Absent, Minimized or Managed (Pneumonia)  Outcome: Ongoing (interventions implemented as appropriate)   01/20/19 0445   Goal/Outcome Evaluation   Problems Assessed (Pneumonia) all   Problems Present (Pneumonia) infection progression       Problem: Pain, Chronic (Adult)  Goal: Acceptable Pain/Comfort Level and Functional Ability  Outcome: Ongoing (interventions implemented as appropriate)   01/20/19 0445   Pain, Chronic (Adult)   Acceptable Pain/Comfort Level and Functional Ability making progress toward outcome

## 2019-01-20 NOTE — DISCHARGE SUMMARY
[facility] Medicine Services  DISCHARGE SUMMARY     Date of Admission: 1/16/2019  Date of Discharge:  1/20/2019  Primary Care Physician: Palomo Valenzuela DO    Presenting Problem/History of Present Illness:  Hospital-acquired pneumonia [J18.9] Presented with shortness of breath    Discharge Diagnoses:  Assessment:  1.  Simple sepsis  2.  Hospital-acquired pneumonia, left lower lobe, strep pneumonia, Haemophilus influenza, methicillin-resistant staph aureus sputum  3.  Widespread metastatic cancer  4.  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  Vulvar, right labial mass lesion, primary squamous cell carcinoma  6.  Normocytic anemia likely of malignancy, anemia chronic disease  7.  Thrombocytosis suspect reactive  8.  Long-standing tobacco use, recently quit  9.  Essential hypertension  10.  Leukocytosis, resolving  11.  Normocytic anemia, stable    Chief Complaint on Day of Discharge: Pain better after fentanyl patch dose increased  History of Present Illness on Day of Discharge:   Lying in bed eating breakfast.  Family members in room.  Fentanyl patch increased to 50 grams yesterday.  She reports better pain control.  She was actually able to get up and get a shower and walk outside the room with the assistance of nursing staff yesterday.  She denies nausea, vomiting or abdominal pain.  She denies chest pain, palpitations or shortness of breath.  No oxygen in use.  No sputum production.    Consults:   1.  Dr. Velasco, oncology  2.  Palliative care    Procedures Performed: none    Pertinent Test Results:   Laboratory Data:    Results from last 7 days   Lab Units 01/20/19  0409 01/19/19  0421 01/18/19  0528   WBC 10*3/mm3 13.05* 13.61* 15.24*   HEMOGLOBIN g/dL 8.2* 8.4* 8.5*   HEMATOCRIT % 24.9* 25.9* 25.8*   PLATELETS 10*3/mm3 576* 555* 532*        Results from last 7 days   Lab Units 01/20/19  0409 01/19/19  0421 01/18/19  0528   SODIUM mmol/L 136 135  137   POTASSIUM mmol/L 3.8 4.1 3.8   CHLORIDE mmol/L 99 98 100   CO2 mmol/L 28.0 27.0 27.0   BUN mg/dL 9 13 15   CREATININE mg/dL 0.26* 0.29* 0.37*   CALCIUM mg/dL 7.6* 7.4* 7.7*   BILIRUBIN mg/dL 0.2 0.2 0.2   ALK PHOS U/L 88 89 103   ALT (SGPT) U/L 18 15 17   AST (SGOT) U/L 16 16 17   GLUCOSE mg/dL 101* 96 123*       Culture Data:   Blood Culture   Date Value Ref Range Status   01/18/2019 No growth at 24 hours  Preliminary   01/16/2019 Staphylococcus epidermidis (A)  Final   01/16/2019 Staphylococcus epidermidis (A)  Preliminary     Comment:     This organism is the same as the organism isolated from Blood Culture [id number], collected 1/16/2019 1357 from Left hand.     Respiratory Culture   Date Value Ref Range Status   01/16/2019 Light growth (2+) Haemophilus influenzae (A)  Final   01/16/2019 Light growth (2+) Streptococcus pneumoniae (A)  Final   01/16/2019 Light growth (2+) Staphylococcus aureus, MRSA (A)  Final     Comment:       Methicillin resistant Staphylococcus aureus, Patient may be an isolation risk.   01/16/2019 Light growth (2+) Normal Respiratory Khushboo  Final     Edited Result - FINAL  Respiratory Culture Light growth (2+) Haemophilus influenzae Abnormal    Beta Lactamase Negative   Respiratory Culture Light growth (2+) Streptococcus pneumoniae Abnormal     Light growth (2+) Staphylococcus aureus, MRSA Abnormal       Methicillin resistant Staphylococcus aureus, Patient may be an isolation risk.   Beta Lactamase Positive   Respiratory Culture Light growth (2+) Normal Respiratory Khushboo   Gram Stain Greater than 25 WBCs per low power field    No Epithelial cells per low power field    Rare (1+) Mixed gram positive khushboo             Susceptibility      Haemophilus influenzae Streptococcus pneumoniae     DISK DIFFUSION MEG     Ampicillin Susceptible       Cefotaxime Susceptible       Cefotaxime (Meningitis)  <=0.12  Susceptible     Cefotaxime (Non-Meningitis)  <=0.12  Susceptible     Ciprofloxacin  Susceptible       Erythromycin  >=8  Resistant     Levofloxacin  1  Susceptible     Penicillin G  <=0.06  Susceptible     Tetracycline  <=0.25  Susceptible     Trimethoprim + Sulfamethoxazole Susceptible <=10  Susceptible     Vancomycin  0.25  Susceptible              Linear View   Susceptibility      Staphylococcus aureus, MRSA     MEG     Clindamycin <=0.25  Susceptible     Erythromycin >=8  Resistant     Gentamicin <=0.5  Susceptible     Inducible Clindamycin Resistance NEG  Negative     Levofloxacin 4  Intermediate1     Oxacillin >=4  Resistant     Penicillin G >=0.5  Resistant     Tetracycline <=1  Susceptible     Trimethoprim + Sulfamethoxazole <=10  Susceptible     Vancomycin <=0.5  Susceptible           1 Staphylococcus species may develop resistance during prolonged therapy with quinolones. Isolates that are initially susceptible may become resistant within three to four days after initiation of therapy. Testing of repeat isolates may be warranted.        Linear View   Susceptibility Comments     Staphylococcus aureus, MRSA   This isolate does not demonstrate inducible clindamycin resistance in vitro.          01/16/2019 1957 01/16/2019 2016  Mycoplasma Pneumoniae PCR - Swab, Nasopharynx [[id number]]   Swab from Nasopharynx     In process  No result data             01/16/2019 1357  01/19/2019 0635  Blood Culture - Blood, Hand, Left [[id number]]     (Abnormal)   Blood from Hand, Left     Final result  Blood Culture Staphylococcus epidermidis Abnormal    Isolated from Aerobic and Anaerobic Bottles   Gram Stain Gram positive cocci in clusters       Susceptibility      Staphylococcus epidermidis     MEG     Clindamycin >=8  Resistant     Erythromycin >=8  Resistant     Gentamicin <=0.5  Susceptible     Inducible Clindamycin Resistance NEG  Negative     Levofloxacin >=8  Resistant     Oxacillin >=4  Resistant     Penicillin G >=0.5  Resistant     Tetracycline 2  Susceptible     Vancomycin 1  Susceptible                       Imaging Results (all)     Procedure Component Value Units Date/Time    CT Angiogram Chest With Contrast [[id number]] Collected:  01/16/19 1236     Updated:  01/16/19 1250    Narrative:       CT ANGIOGRAM CHEST W CONTRAST- 1/16/2019 12:20 PM CST      HISTORY: Shortness of air and chest pain      COMPARISON: Radiation planning CT 12/31/2018 and CT of the chest  12/8/2018.      DOSE LENGTH PRODUCT: 405 mGy cm. Automated exposure control was also  utilized to decrease patient radiation dose.     TECHNIQUE: Helical tomographic images of the chest were obtained after  the administration of intravenous contrast following angiogram protocol.  Additionally, 3D and multiplanar reformatted images were provided.        FINDINGS:    Pulmonary arteries: There is no evidence of pulmonary embolus. The  pulmonary arteries are slightly enlarged, consistent with pulmonary  arterial hypertension     Aorta and great vessels: There is atherosclerosis in the aorta without  aneurysm or dissection. There is atherosclerosis in the great vessels.  The patient's port extends into the SVC.     Visualized neck base: The imaged portion of the base of the neck appears  unremarkable.      Lungs: There is a large mass in the LEFT lower lobe. Surrounding  consolidation is present. Areas of cavitation are seen within the mass.  These are new since the previous study. Moderate emphysematous changes  are present in the lungs.       Heart: The heart is normal in size. Coronary artery calcifications are  visualized. Trace pericardial fluid is present.      Mediastinum and lymph nodes: Mediastinal, LEFT hilar, and subcarinal  lymphadenopathy is unchanged. There is a new LEFT axillary lymph node  that measures 1.3 cm in diameter.      Skeletal and soft tissues: A metastatic lesion is seen in the inferior  glenoid process of the LEFT scapula. No other destructive lesions are  seen in the visualized bones.     Upper abdomen: Enlarging adrenal  metastases are seen bilaterally. These  measure up to 6.7 cm.       Impression:       1. No evidence of pulmonary embolus.  2. LEFT lower lobe lung mass with surrounding pneumonia. The pneumonia  is new since the previous study.  3. Mediastinal and LEFT hilar lymphadenopathy is again noted.  4. There is a new enlarged lymph node in the LEFT axilla that measures  1.3 cm in short axis.  5. New destructive changes in the inferior glenoid on the LEFT. This is  consistent with metastatic disease.  6. Large metastatic masses in the adrenal glands are again noted. These  have enlarged.        This report was finalized on 01/16/2019 12:47 by Dr. Yousif Herrera MD.    XR Chest 1 View [[id number]] Collected:  01/16/19 1118     Updated:  01/16/19 1123    Narrative:       History:  68-year-old chest pain.     Reference:  Chest radiograph December 11, 2018. CT chest December 2018.     Findings:  Frontal chest radiograph performed.     Extensive opacity in the left lower lobe base corresponds with known  tumor. New hazy opacity is in the mid left lower lobe and lingula. Lungs  are emphysematous. Questionable small left pleural effusion. No  pneumothorax. Left subclavian Port-A-Cath, tip in SVC.          Impression:          1. New hazy opacities in the left upper lobe, questionable developing  pneumonia.  2. Known extensive left lower lobe tumor.  This report was finalized on 01/16/2019 11:20 by Dr Riley Acevedo, .        Hospital Course  Patient is a 68 y.o. female presented to [facility] emergency room 1/16/19 with complaints of left-sided chest pain associated with shortness of breath.  Patient felt as though she had pneumonia as she had yellow sputum production and congestion.  She denied fever or chills.  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  She has been followed by Dr. Velasco.  She is currently undergoing radiation therapy by   [facility].  She had recent kyphoplasty surgery 12/10/18.  WBC 18.73. chest x-ray showed new hazy opacities in the right upper lobe questionable developing pneumonia.  No evidence of left lower lobe tumor.  CT angiogram of the chest showed no pulmonary embolus.  Left lower lobe lung mass with surrounding pneumonia.  Pneumonia new since previous study.  Mediastinal and left hilar lymphadenopathy noted.  Enlargement of left axilla.  New destructive changes in the inferior glenoid on the left consistent with metastatic disease.  Large metastatic masses in the adrenal glands noted.  She received aspirin, normal saline fluid bolus and Zosyn in the emergency room.    She was admitted to the medical floor with simple sepsis, pneumonia left lower lobe and widespread metastatic cancer.  She was placed on vancomycin and cefepime.  Mucinex and DuoNeb treatments added.  Blood cultures were drawn and reported Staphylococcus epidermidis, likely contaminant.  Repeat blood culture 1/18/19 no growth.  Strep pneumonia urine negative, Legionella negative.  Sputum culture positive for strep pneumonia, MRSA, and Haemophilus influenza.  Antibiotic adjusted after all sensitivities returned and the patient will be placed on doxycycline 100 mg twice daily for 7 days and Cipro 500 mg twice daily for 7 days.  In addition, she will be started on Florastor.    She was seen in consultation by Dr. Velasco with oncology.  No further oncology workup recommended.  Dr. Velasco recommended palliative care consult.  She was seen by palliative care nurse and recommendations noted.  At present, she is currently undergoing radiation treatments.  She is under the care of [facility].  Patient is considering hospice after she completes radiation treatments.  She was seen by [facility] hospice representative Mayela Ponce.  [facility] with [facility] will be resumed at discharge.  She received radiation treatments on Thursday and Friday during  "hospital stay.    She is generally deconditioned.  She has not been able to ambulate much at home.  Physical therapy consulted and patient was not able to participate with physical therapy due to significant amount of pain.  Duragesic increased to 50 µg.  After increase of Duragesic patient was able to get a shower with assistance of nursing staff and ambulate short distance in room out into the kemp.  She reports better pain control with increased Duragesic to 50 µg.  Patient and family have been instructed to use 25 mg patches that she has at home and use 2 patches for total of 50 µg.  She reported to Dr. Osorio that she would like medication for depression.  She was started on Prozac.    On 1/20/19 she is medically stable for discharge.  She will be going home with her brother and [facility] with [facility] will be continued.  Family members have already notified [facility].  She will continue radiation with next treatment 1/21/19.  She had no primary care provider; therefore, we have arranged follow-up with Dr. Valenzuela on 1/29/18 to establish care as new patient.  Son and daughter-in-law present in room on date of discharge and plan of care and questions answered.    Physical Exam on Discharge:  /62 (BP Location: Right arm, Patient Position: Lying)   Pulse 119   Temp 98 °F (36.7 °C) (Temporal)   Resp 16   Ht 152.4 cm (60\")   Wt 51.2 kg (112 lb 12.8 oz)   SpO2 95%   BMI 22.03 kg/m²   Physical Exam  Constitutional: She is oriented to person, place, and time. She appears well-developed and well-nourished.   HENT:   Head: Normocephalic and atraumatic.   Eyes: EOM are normal. Pupils are equal, round, and reactive to light.   Neck: Normal range of motion. Neck supple.   Cardiovascular: Normal rate, regular rhythm, normal heart sounds and intact distal pulses. Exam reveals no gallop and no friction rub.   No murmur heard.  Pulmonary/Chest: Effort normal and breath sounds normal. She has no wheezes. She has no " rales.   Decreased breath sounds. Lungs clear. No oxygen in use.   Abdominal: Soft. Bowel sounds are normal.   Genitourinary:   Genitourinary Comments: Voiding.   Musculoskeletal: Normal range of motion. She exhibits no edema or deformity.   Neurological: She is alert and oriented to person, place, and time.   Skin: Skin is warm and dry.   Psychiatric: She has a normal mood and affect. Her behavior is normal. Judgment and thought content normal.     Condition on Discharge: Stable    Discharge Disposition:  Home with brother. [facility] following    Discharge Diet:   Diet Instructions     Advance Diet As Tolerated         as tolerated    Activity at Discharge:   Activity Instructions     Activity as Tolerated         progressive activity as tolerated    Discharge Care Plan / Instructions:   1.  Increase Duragesic to 50 µg daily.  Please use 2 of the 25 mg patches that patient currently has at home.  2.  Cipro 500 mg twice daily ×7 days  3.  Doxycycline 100 mg orally twice daily ×7 days  4.  Florastor 250 mg twice daily  5.  Follow-up with Dr. Valenzuela 1/29/18 to establish care as new patient.  6.  Resume radiation treatments 1/21/19 per Dr. Camacho.    Discharge Medications:     Discharge Medications      New Medications      Instructions Start Date   ciprofloxacin 500 MG tablet  Commonly known as:  CIPRO   500 mg, Oral, Every 12 Hours Scheduled      doxycycline 100 MG tablet  Commonly known as:  ADOXA   100 mg, Oral, Every 12 Hours Scheduled      FLUoxetine 20 MG capsule  Commonly known as:  PROzac   20 mg, Oral, Daily      guaiFENesin 600 MG 12 hr tablet  Commonly known as:  MUCINEX   1,200 mg, Oral, Every 12 Hours Scheduled      megestrol 40 MG/ML suspension  Commonly known as:  MEGACE   400 mg, Oral, Daily      saccharomyces boulardii 250 MG capsule  Commonly known as:  FLORASTOR   250 mg, Oral, 2 Times Daily         Changes to Medications      Instructions Start Date   fentaNYL 25 MCG/HR patch  Commonly  known as:  DURAGESIC  What changed:    · how much to take  · how to take this  · when to take this  · additional instructions   Use 2 patches on the skin every 72 hours total of 50 mcg/hr         Continue These Medications      Instructions Start Date   ALPRAZolam 0.5 MG tablet  Commonly known as:  XANAX   0.5 mg, Oral, 2 Times Daily PRN      aspirin 81 MG chewable tablet   81 mg, Oral, Daily      cyclobenzaprine 10 MG tablet  Commonly known as:  FLEXERIL   10 mg, Oral, Every 8 Hours PRN      ibuprofen 600 MG tablet  Commonly known as:  ADVIL,MOTRIN   600 mg, Oral, Every 8 Hours PRN      oxyCODONE-acetaminophen  MG per tablet  Commonly known as:  PERCOCET   2 tablets, Oral, Every 6 Hours PRN      pantoprazole 20 MG EC tablet  Commonly known as:  PROTONIX   20 mg, Oral, Daily         Stop These Medications    predniSONE 20 MG tablet  Commonly known as:  DELTASONE          Follow-up Appointments:   Follow-up Information     Palomo Valenzuela DO Follow up on 1/29/2019.    Specialty:  Internal Medicine  Why:   Tuesday, January 29th at 2:00 PM  Contact information:  [address]  [facility] [address]  [phone]                 Future Appointments:  Future Appointments   Date Time Provider Department Center   1/21/2019  1:10 PM  [facility]  [facility]   1/22/2019  1:10 PM  [facility]  [facility]   1/29/2019  2:00 PM Palomo Valenzuela DO [facility] None       Test Results Pending at Discharge: none    The above documentation resulted from a face-to-face encounter by me Beatriz ROCA, [facility].    ABELINO Wray  01/20/19  9:35 AM    Time:  This discharge process required 45 minutes for completion.     Plan discussed with Dr. Osorio, patient, son and daughter-in-law present in room.  Discussed with Dr. Velasco prior to discharge.    Time spent in face-to-face evaluation, chart review, planning and education 45 minutes.  Please note that portions of this note may have  been completed with a voice recognition program. Efforts were made to edit the dictations, but occasionally words are mistranscribed.

## 2019-01-21 ENCOUNTER — READMISSION MANAGEMENT (OUTPATIENT)
Dept: CALL CENTER | Facility: HOSPITAL | Age: 69
End: 2019-01-21

## 2019-01-21 ENCOUNTER — HOSPITAL ENCOUNTER (OUTPATIENT)
Dept: RADIATION ONCOLOGY | Facility: HOSPITAL | Age: 69
Discharge: HOME | End: 2019-01-21

## 2019-01-21 LAB
BACTERIA SPEC AEROBE CULT: ABNORMAL
GRAM STN SPEC: ABNORMAL
ISOLATED FROM: ABNORMAL
M PNEUMO DNA SPEC QL NAA+PROBE: NEGATIVE

## 2019-01-21 PROCEDURE — 77412 RADIATION TX DELIVERY LVL 3: CPT | Performed by: RADIOLOGY

## 2019-01-21 NOTE — THERAPY DISCHARGE NOTE
Acute Care - Occupational Therapy Discharge Summary  [facility]     Patient Name: Kasey Rios  : 1950  MRN: [id number]    Today's Date: 2019  Onset of Illness/Injury or Date of Surgery: 19    Date of Referral to OT: 19  Referring Physician: Dr. Osorio      Admit Date: 2019        OT Recommendation and Plan    Visit Dx:    ICD-10-CM ICD-9-CM   1. Hospital-acquired pneumonia J18.9 486   2. Impaired mobility Z74.09 799.89   3. Decreased activities of daily living (ADL) R68.89 780.99               Rehab Goal Summary     Row Name 19 0808             Physical Therapy Goals    Bed Mobility Goal Selection (PT)  bed mobility, PT goal 1  -CW      Transfer Goal Selection (PT)  transfer, PT goal 1  -CW      Gait Training Goal Selection (PT)  gait training, PT goal 1  -CW         Bed Mobility Goal 1 (PT)    Activity/Assistive Device (Bed Mobility Goal 1, PT)  rolling to left;rolling to right  -CW      [place] Level/Cues Needed (Bed Mobility Goal 1, PT)  conditional independence  -CW      Time Frame (Bed Mobility Goal 1, PT)  by discharge  -CW      Progress/Outcomes (Bed Mobility Goal 1, PT)  goal not met  -CW         Transfer Goal 1 (PT)    Activity/Assistive Device (Transfer Goal 1, PT)  sit-to-stand/stand-to-sit possibly sidelying to stand because pt has pain with sitting  -CW      [place] Level/Cues Needed (Transfer Goal 1, PT)  moderate assist (50-74% patient effort)  -CW      Time Frame (Transfer Goal 1, PT)  by discharge  -CW      Progress/Outcome (Transfer Goal 1, PT)  goal not met  -CW         Gait Training Goal 1 (PT)    Activity/Assistive Device (Gait Training Goal 1, PT)  gait (walking locomotion);walker, rolling  -CW      [place] Level (Gait Training Goal 1, PT)  minimum assist (75% or more patient effort)  -CW      Distance (Gait Goal 1, PT)  10  -CW      Time Frame (Gait Training Goal 1, PT)  by discharge  -CW      Progress/Outcome (Gait Training Goal 1, PT)  goal  not met  -CW         Bed Mobility Goal 1 (OT)    Activity/Assistive Device (Bed Mobility Goal 1, OT)  rolling to left;rolling to right;bridging;scooting;sidelying to sit;sit to sidelying;bed rails  -TS      Caroline Level/Cues Needed (Bed Mobility Goal 1, OT)  contact guard assist  -TS      Time Frame (Bed Mobility Goal 1, OT)  by discharge;long term goal (LTG)  -TS      Progress/Outcomes (Bed Mobility Goal 1, OT)  goal not met  -TS         Transfer Goal 1 (OT)    Activity/Assistive Device (Transfer Goal 1, OT)  commode, bedside without drop arms  -TS      Caroline Level/Cues Needed (Transfer Goal 1, OT)  contact guard assist  -TS      Time Frame (Transfer Goal 1, OT)  by discharge;long term goal (LTG)  -TS      Progress/Outcome (Transfer Goal 1, OT)  goal not met  -TS         Toileting Goal 1 (OT)    Activity/Device (Toileting Goal 1, OT)  adjust/manage clothing;perform perineal hygiene  -TS      Caroline Level/Cues Needed (Toileting Goal 1, OT)  moderate assist (50-74% patient effort)  -TS      Time Frame (Toileting Goal 1, OT)  by discharge;long term goal (LTG)  -TS      Progress/Outcome (Toileting Goal 1, OT)  goal not met  -TS        User Key  (r) = Recorded By, (t) = Taken By, (c) = Cosigned By    Initials Name Provider Type Discipline    TS Karmen Calderón, CAGLE/L Occupational Therapy Assistant OT    Lavern Spivey PTA Physical Therapy Assistant PT          Outcome Measures     Row Name 01/18/19 1500 01/18/19 1300          How much help from another person do you currently need...    Turning from your back to your side while in flat bed without using bedrails?  --  3  -SB     Moving from lying on back to sitting on the side of a flat bed without bedrails?  --  3  -SB     Moving to and from a bed to a chair (including a wheelchair)?  --  1  -SB     Standing up from a chair using your arms (e.g., wheelchair, bedside chair)?  --  1  -SB     Climbing 3-5 steps with a railing?  --  1  -SB      To walk in hospital room?  --  1  -SB     AM-PAC 6 Clicks Score  --  10  -SB        How much help from another is currently needed...    Putting on and taking off regular lower body clothing?  2  -TR  --     Bathing (including washing, rinsing, and drying)  2  -TR  --     Toileting (which includes using toilet bed pan or urinal)  2  -TR  --     Putting on and taking off regular upper body clothing  3  -TR  --     Taking care of personal grooming (such as brushing teeth)  4  -TR  --     Eating meals  4  -TR  --     Score  17  -TR  --        Functional Assessment    Outcome Measure Options  AM-PAC 6 Clicks Daily Activity (OT)  -TR  AM-PAC 6 Clicks Basic Mobility (PT)  -SB       User Key  (r) = Recorded By, (t) = Taken By, (c) = Cosigned By    Initials Name Provider Type    Karmen Baker, OTR/L Occupational Therapist    SB Maria Antonia Cho PT Physical Therapist          Therapy Suggested Charges     Code   Minutes Charges    None                 OT Discharge Summary  Reason for Discharge: Discharge from facility  Outcomes Achieved: Refer to plan of care for updates on goals achieved  Discharge Destination: Home with assist      DIGNA Streeter  1/21/2019

## 2019-01-21 NOTE — DISCHARGE PLACEMENT REQUEST
"[facility]  TAWANDA COLVIN RN CM [phone]        Trish Carcamo (68 y.o. Female)     Date of Birth Social Security Number Address Home Phone MRN    [id number]  [id number] [place] [id number] [phone] [id number]    Jew Marital Status          Non-[employer] Single       Admission Date Admission Type Admitting Provider Attending Provider Department, Room/Bed    1/16/19 Emergency Pernell Osorio MD  [facility] 4C, 494/1    Discharge Date Discharge Disposition Discharge Destination        1/20/2019 Home or Self Care              Attending Provider:  (none)   Allergies:  No Known Allergies    Isolation:  Contact Drop   Infection:  MRSA (01/19/19)   Code Status:  Prior    Ht:  152.4 cm (60\")   Wt:  51.2 kg (112 lb 12.8 oz)    Admission Cmt:  None   Principal Problem:  None                Active Insurance as of 1/16/2019     Primary Coverage     Payor Plan Insurance Group Employer/Plan Group    MEDICARE MEDICARE A & B      Payor Plan Address Payor Plan Phone Number Payor Plan Fax Number Effective Dates    [address] [phone]  9/1/2015 - None Entered    [facility] [id number]       Subscriber Name Subscriber Birth Date Member ID       TRISH CARCAMO 1950 [id number]           Secondary Coverage     Payor Plan Insurance Group Employer/Plan Group    HUMANA MEDICAID HU[employer]NA [employer] [id number]     Payor Plan Address Payor Plan Phone Number Payor Plan Fax Number Effective Dates    PO  [phone]  11/1/2015 - None Entered    [facility] [id number]       Subscriber Name Subscriber Birth Date Member ID       TRISH CARCAMO 1950 [id number]                 Emergency Contacts      (Rel.) Home Phone Work Phone Mobile Phone    GabrielLele (Son) [phone] -- --    Garrett Carcamo -- -- [phone]    Susana Dalton (Daughter) [phone] -- --               Discharge Summary      Beatriz Durand, APRN at 1/20/2019  8:48 AM     Attestation " signed by Pernell Osorio MD at 1/20/2019  1:09 PM    I personally evaluated and examined the patient in conjunction with ABELINO Garza and agree with the assessment, treatment plan, and disposition of the patient as recorded by her. My history, exam, and further recommendations are:     S:  Doing ok, no SOA or fever.  Pain better controlled    O:  CVS RRR    A:  MRSA/S. Pneumoniae/Haemophilus pneumonia    P:  OK for d/c on PO abx  Increase Fentanyl to 2 patches, family seems reliable to place/remove patches appropriately            Pernell Osorio MD  01/20/19  1:08 PM                        [facility] Medicine Services  DISCHARGE SUMMARY     Date of Admission: 1/16/2019  Date of Discharge:  1/20/2019  Primary Care Physician: Palomo Valenzuela DO    Presenting Problem/History of Present Illness:  Hospital-acquired pneumonia [J18.9] Presented with shortness of breath    Discharge Diagnoses:  Assessment:  1.  Simple sepsis  2.  Hospital-acquired pneumonia, left lower lobe, strep pneumonia, Haemophilus influenza, methicillin-resistant staph aureus sputum  3.  Widespread metastatic cancer  4.  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  Vulvar, right labial mass lesion, primary squamous cell carcinoma  6.  Normocytic anemia likely of malignancy, anemia chronic disease  7.  Thrombocytosis suspect reactive  8.  Long-standing tobacco use, recently quit  9.  Essential hypertension  10.  Leukocytosis, resolving  11.  Normocytic anemia, stable    Chief Complaint on Day of Discharge: Pain better after fentanyl patch dose increased  History of Present Illness on Day of Discharge:   Lying in bed eating breakfast.  Family members in room.  Fentanyl patch increased to 50 grams yesterday.  She reports better pain control.  She was actually able to get up and get a shower and walk outside the room with the assistance of nursing staff yesterday.  She denies nausea,  vomiting or abdominal pain.  She denies chest pain, palpitations or shortness of breath.  No oxygen in use.  No sputum production.    Consults:   1.  Dr. Velasco , oncology  2.  Palliative care    Procedures Performed: none    Pertinent Test Results:   Laboratory Data:    Results from last 7 days   Lab Units 01/20/19  0409 01/19/19  0421 01/18/19  0528   WBC 10*3/mm3 13.05* 13.61* 15.24*   HEMOGLOBIN g/dL 8.2* 8.4* 8.5*   HEMATOCRIT % 24.9* 25.9* 25.8*   PLATELETS 10*3/mm3 576* 555* 532*        Results from last 7 days   Lab Units 01/20/19  0409 01/19/19  0421 01/18/19  0528   SODIUM mmol/L 136 135 137   POTASSIUM mmol/L 3.8 4.1 3.8   CHLORIDE mmol/L 99 98 100   CO2 mmol/L 28.0 27.0 27.0   BUN mg/dL 9 13 15   CREATININE mg/dL 0.26* 0.29* 0.37*   CALCIUM mg/dL 7.6* 7.4* 7.7*   BILIRUBIN mg/dL 0.2 0.2 0.2   ALK PHOS U/L 88 89 103   ALT (SGPT) U/L 18 15 17   AST (SGOT) U/L 16 16 17   GLUCOSE mg/dL 101* 96 123*       Culture Data:   Blood Culture   Date Value Ref Range Status   01/18/2019 No growth at 24 hours  Preliminary   01/16/2019 Staphylococcus epidermidis (A)  Final   01/16/2019 Staphylococcus epidermidis (A)  Preliminary     Comment:     This organism is the same as the organism isolated from Blood Culture [id number], collected 1/16/2019 1357 from Left hand.     Respiratory Culture   Date Value Ref Range Status   01/16/2019 Light growth (2+) Haemophilus influenzae (A)  Final   01/16/2019 Light growth (2+) Streptococcus pneumoniae (A)  Final   01/16/2019 Light growth (2+) Staphylococcus aureus, MRSA (A)  Final     Comment:       Methicillin resistant Staphylococcus aureus, Patient may be an isolation risk.   01/16/2019 Light growth (2+) Normal Respiratory Sandra  Final     Edited Result - FINAL  Respiratory Culture Light growth (2+) Haemophilus influenzae Abnormal    Beta Lactamase Negative   Respiratory Culture Light growth (2+) Streptococcus pneumoniae Abnormal     Light growth (2+) Staphylococcus aureus, MRSA  Abnormal       Methicillin resistant Staphylococcus aureus, Patient may be an isolation risk.   Beta Lactamase Positive   Respiratory Culture Light growth (2+) Normal Respiratory Khushboo   Gram Stain Greater than 25 WBCs per low power field    No Epithelial cells per low power field    Rare (1+) Mixed gram positive khushboo             Susceptibility      Haemophilus influenzae Streptococcus pneumoniae     DISK DIFFUSION MEG     Ampicillin Susceptible       Cefotaxime Susceptible       Cefotaxime (Meningitis)  <=0.12  Susceptible     Cefotaxime (Non-Meningitis)  <=0.12  Susceptible     Ciprofloxacin Susceptible       Erythromycin  >=8  Resistant     Levofloxacin  1  Susceptible     Penicillin G  <=0.06  Susceptible     Tetracycline  <=0.25  Susceptible     Trimethoprim + Sulfamethoxazole Susceptible <=10  Susceptible     Vancomycin  0.25  Susceptible              Linear View   Susceptibility      Staphylococcus aureus, MRSA     MEG     Clindamycin <=0.25  Susceptible     Erythromycin >=8  Resistant     Gentamicin <=0.5  Susceptible     Inducible Clindamycin Resistance NEG  Negative     Levofloxacin 4  Intermediate1     Oxacillin >=4  Resistant     Penicillin G >=0.5  Resistant     Tetracycline <=1  Susceptible     Trimethoprim + Sulfamethoxazole <=10  Susceptible     Vancomycin <=0.5  Susceptible           1 Staphylococcus species may develop resistance during prolonged therapy with quinolones. Isolates that are initially susceptible may become resistant within three to four days after initiation of therapy. Testing of repeat isolates may be warranted.        Linear View   Susceptibility Comments     Staphylococcus aureus, MRSA   This isolate does not demonstrate inducible clindamycin resistance in vitro.          01/16/2019 1957  01/16/2019 2016  Mycoplasma Pneumoniae PCR - Swab, Nasopharynx [[id number]]   Swab from Nasopharynx     In process  No result data             01/16/2019 1357  01/19/2019 0635  Blood Culture -  Blood, Hand, Left [[id number]]     (Abnormal)   Blood from Hand, Left     Final result  Blood Culture Staphylococcus epidermidis Abnormal    Isolated from Aerobic and Anaerobic Bottles   Gram Stain Gram positive cocci in clusters       Susceptibility      Staphylococcus epidermidis     MEG     Clindamycin >=8  Resistant     Erythromycin >=8  Resistant     Gentamicin <=0.5  Susceptible     Inducible Clindamycin Resistance NEG  Negative     Levofloxacin >=8  Resistant     Oxacillin >=4  Resistant     Penicillin G >=0.5  Resistant     Tetracycline 2  Susceptible     Vancomycin 1  Susceptible                      Imaging Results (all)     Procedure Component Value Units Date/Time    CT Angiogram Chest With Contrast [[id number]] Collected:  01/16/19 1236     Updated:  01/16/19 1250    Narrative:       CT ANGIOGRAM CHEST W CONTRAST- 1/16/2019 12:20 PM CST      HISTORY: Shortness of air and chest pain      COMPARISON: Radiation planning CT 12/31/2018 and CT of the chest  12/8/2018.      DOSE LENGTH PRODUCT: 405 mGy cm. Automated exposure control was also  utilized to decrease patient radiation dose.     TECHNIQUE: Helical tomographic images of the chest were obtained after  the administration of intravenous contrast following angiogram protocol.  Additionally, 3D and multiplanar reformatted images were provided.        FINDINGS:    Pulmonary arteries: There is no evidence of pulmonary embolus. The  pulmonary arteries are slightly enlarged, consistent with pulmonary  arterial hypertension     Aorta and great vessels: There is atherosclerosis in the aorta without  aneurysm or dissection. There is atherosclerosis in the great vessels.  The patient's port extends into the SVC.     Visualized neck base: The imaged portion of the base of the neck appears  unremarkable.      Lungs: There is a large mass in the LEFT lower lobe. Surrounding  consolidation is present. Areas of cavitation are seen within the mass.  These are new since  the previous study. Moderate emphysematous changes  are present in the lungs.       Heart: The heart is normal in size. Coronary artery calcifications are  visualized. Trace pericardial fluid is present.      Mediastinum and lymph nodes: Mediastinal, LEFT hilar, and subcarinal  lymphadenopathy is unchanged. There is a new LEFT axillary lymph node  that measures 1.3 cm in diameter.      Skeletal and soft tissues: A metastatic lesion is seen in the inferior  glenoid process of the LEFT scapula. No other destructive lesions are  seen in the visualized bones.     Upper abdomen: Enlarging adrenal metastases are seen bilaterally. These  measure up to 6.7 cm.       Impression:       1. No evidence of pulmonary embolus.  2. LEFT lower lobe lung mass with surrounding pneumonia. The pneumonia  is new since the previous study.  3. Mediastinal and LEFT hilar lymphadenopathy is again noted.  4. There is a new enlarged lymph node in the LEFT axilla that measures  1.3 cm in short axis.  5. New destructive changes in the inferior glenoid on the LEFT. This is  consistent with metastatic disease.  6. Large metastatic masses in the adrenal glands are again noted. These  have enlarged.        This report was finalized on 01/16/2019 12:47 by Dr. Yousif Herrera MD.    XR Chest 1 View [[id number]] Collected:  01/16/19 1118     Updated:  01/16/19 1123    Narrative:       History:  68-year-old chest pain.     Reference:  Chest radiograph December 11, 2018. CT chest December 2018.     Findings:  Frontal chest radiograph performed.     Extensive opacity in the left lower lobe base corresponds with known  tumor. New hazy opacity is in the mid left lower lobe and lingula. Lungs  are emphysematous. Questionable small left pleural effusion. No  pneumothorax. Left subclavian Port-A-Cath, tip in SVC.          Impression:          1. New hazy opacities in the left upper lobe, questionable developing  pneumonia.  2. Known extensive left lower lobe  tumor.  This report was finalized on 01/16/2019 11:20 by Dr Riley Acevedo, .        Hospital Course  Patient is a 68 y.o. female presented to [facility] emergency room 1/16/19 with complaints of left-sided chest pain associated with shortness of breath.  Patient felt as though she had pneumonia as she had yellow sputum production and congestion.  She denied fever or chills.  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  She has been followed by Dr. Velasco.  She is currently undergoing radiation therapy by Dr. Camacho.  She had recent kyphoplasty surgery 12/10/18.  WBC 18.73. chest x-ray showed new hazy opacities in the right upper lobe questionable developing pneumonia.  No evidence of left lower lobe tumor.  CT angiogram of the chest showed no pulmonary embolus.  Left lower lobe lung mass with surrounding pneumonia.  Pneumonia new since previous study.  Mediastinal and left hilar lymphadenopathy noted.  Enlargement of left axilla.  New destructive changes in the inferior glenoid on the left consistent with metastatic disease.  Large metastatic masses in the adrenal glands noted.  She received aspirin, normal saline fluid bolus and Zosyn in the emergency room.    She was admitted to the medical floor with simple sepsis, pneumonia left lower lobe and widespread metastatic cancer.  She was placed on vancomycin and cefepime.  Mucinex and DuoNeb treatments added.  Blood cultures were drawn and reported Staphylococcus epidermidis, likely contaminant.  Repeat blood culture 1/18/19 no growth.  Strep pneumonia urine negative, Legionella negative.  Sputum culture positive for strep pneumonia, MRSA, and Haemophilus influenza.  Antibiotic adjusted after all sensitivities returned and the patient will be placed on doxycycline 100 mg twice daily for 7 days and Cipro 500 mg twice daily for 7 days.  In addition, she will be started on  Sarah.    She was seen in consultation by Dr. Velasco with oncology.  No further oncology workup recommended.  Dr. Velasco recommended palliative care consult.  She was seen by palliative care nurse and recommendations noted.  At present, she is currently undergoing radiation treatments.  She is under the care of [facility] at [facility].  Patient is considering hospice after she completes radiation treatments.  She was seen by [facility] hospice representative Mayelaparker Ponce.  [facility] with [facility] will be resumed at discharge.  She received radiation treatments on Thursday and Friday during hospital stay.    She is generally deconditioned.  She has not been able to ambulate much at home.  Physical therapy consulted and patient was not able to participate with physical therapy due to significant amount of pain.  Duragesic increased to 50 µg.  After increase of Duragesic patient was able to get a shower with assistance of nursing staff and ambulate short distance in room out into the kemp.  She reports better pain control with increased Duragesic to 50 µg.  Patient and family have been instructed to use 25 mg patches that she has at home and use 2 patches for total of 50 µg.  She reported to Dr. Osorio that she would like medication for depression.  She was started on Prozac.    On 1/20/19 she is medically stable for discharge.  She will be going home with her brother and [facility] with [facility] will be continued.  Family members have already notified [facility].  She will continue radiation with next treatment 1/21/19.  She had no primary care provider; therefore, we have arranged follow-up with Dr. Valenzuela on 1/29/18 to establish care as new patient.  Son and daughter-in-law present in room on date of discharge and plan of care and questions answered.    Physical Exam on Discharge:  /62 (BP Location: Right arm, Patient Position: Lying)   Pulse 119   Temp 98 °F (36.7 °C) (Temporal)   Resp 16   Ht 152.4  "cm (60\")   Wt 51.2 kg (112 lb 12.8 oz)   SpO2 95%   BMI 22.03 kg/m²    Physical Exam  Constitutional: She is oriented to person, place, and time. She appears well-developed and well-nourished.   HENT:   Head: Normocephalic and atraumatic.   Eyes: EOM are normal. Pupils are equal, round, and reactive to light.   Neck: Normal range of motion. Neck supple.   Cardiovascular: Normal rate, regular rhythm, normal heart sounds and intact distal pulses. Exam reveals no gallop and no friction rub.   No murmur heard.  Pulmonary/Chest: Effort normal and breath sounds normal. She has no wheezes. She has no rales.   Decreased breath sounds. Lungs clear. No oxygen in use.   Abdominal: Soft. Bowel sounds are normal.   Genitourinary:   Genitourinary Comments: Voiding.   Musculoskeletal: Normal range of motion. She exhibits no edema or deformity.   Neurological: She is alert and oriented to person, place, and time.   Skin: Skin is warm and dry.   Psychiatric: She has a normal mood and affect. Her behavior is normal. Judgment and thought content normal.     Condition on Discharge: Stable    Discharge Disposition:  Home with brother. [facility] following    Discharge Diet:   Diet Instructions     Advance Diet As Tolerated         as tolerated    Activity at Discharge:   Activity Instructions     Activity as Tolerated         progressive activity as tolerated    Discharge Care Plan / Instructions:   1.  Increase Duragesic to 50 µg daily.  Please use 2 of the 25 mg patches that patient currently has at home.  2.  Cipro 500 mg twice daily ×7 days  3.  Doxycycline 100 mg orally twice daily ×7 days  4.  Florastor 250 mg twice daily  5.  Follow-up with Dr. Valenzuela 1/29/18 to establish care as new patient.  6.  Resume radiation treatments 1/21/19 per Dr. Camacho.    Discharge Medications:     Discharge Medications      New Medications      Instructions Start Date   ciprofloxacin 500 MG tablet  Commonly known as:  CIPRO   500 mg, " Oral, Every 12 Hours Scheduled      doxycycline 100 MG tablet  Commonly known as:  ADOXA   100 mg, Oral, Every 12 Hours Scheduled      FLUoxetine 20 MG capsule  Commonly known as:  PROzac   20 mg, Oral, Daily      guaiFENesin 600 MG 12 hr tablet  Commonly known as:  MUCINEX   1,200 mg, Oral, Every 12 Hours Scheduled      megestrol 40 MG/ML suspension  Commonly known as:  MEGACE   400 mg, Oral, Daily      saccharomyces boulardii 250 MG capsule  Commonly known as:  FLORASTOR   250 mg, Oral, 2 Times Daily         Changes to Medications      Instructions Start Date   fentaNYL 25 MCG/HR patch  Commonly known as:  DURAGESIC  What changed:    · how much to take  · how to take this  · when to take this  · additional instructions   Use 2 patches on the skin every 72 hours total of 50 mcg/hr         Continue These Medications      Instructions Start Date   ALPRAZolam 0.5 MG tablet  Commonly known as:  XANAX   0.5 mg, Oral, 2 Times Daily PRN      aspirin 81 MG chewable tablet   81 mg, Oral, Daily      cyclobenzaprine 10 MG tablet  Commonly known as:  FLEXERIL   10 mg, Oral, Every 8 Hours PRN      ibuprofen 600 MG tablet  Commonly known as:  ADVIL,MOTRIN   600 mg, Oral, Every 8 Hours PRN      oxyCODONE-acetaminophen  MG per tablet  Commonly known as:  PERCOCET   2 tablets, Oral, Every 6 Hours PRN      pantoprazole 20 MG EC tablet  Commonly known as:  PROTONIX   20 mg, Oral, Daily         Stop These Medications    predniSONE 20 MG tablet  Commonly known as:  DELTASONE          Follow-up Appointments:   Follow-up Information     Palomo Valenzuela DO Follow up on 1/29/2019.    Specialty:  Internal Medicine  Why:   Tuesday, January 29th at 2:00 PM  Contact information:  [address]  [facility] [address]  [facility] [address]  [phone]                 Future Appointments:  Future Appointments   Date Time Provider Department Center   1/21/2019  1:10 PM  [facility] P[facility] RO PAD   1/22/2019  1:10 PM  [facility]  PAD RO  PAD   1/29/2019  2:00 PM Palomo Valenzuela, DO [facility] PAD None       Test Results Pending at Discharge: none    The above documentation resulted from a face-to-face encounter by me Beatriz ROCA, [facility].    ABELINO Wray  01/20/19  9:35 AM    Time:  This discharge process required 45 minutes for completion.     Plan discussed with Dr. Osorio, patient, son and daughter-in-law present in room.  Discussed with Dr. Velasco prior to discharge.    Time spent in face-to-face evaluation, chart review, planning and education 45 minutes.  Please note that portions of this note may have been completed with a voice recognition program. Efforts were made to edit the dictations, but occasionally words are mistranscribed.            Electronically signed by Pernell Osorio MD at 1/20/2019  1:09 PM

## 2019-01-21 NOTE — OUTREACH NOTE
Prep Survey      Responses   Facility patient discharged from?  [facility]   Is patient eligible?  Yes   Discharge diagnosis  sepsis,  pneumonia   Does the patient have one of the following disease processes/diagnoses(primary or secondary)?  Sepsis   Does the patient have Home health ordered?  No   Is there a DME ordered?  No   Prep survey completed?  Yes          Rukhsana Molina RN

## 2019-01-21 NOTE — THERAPY DISCHARGE NOTE
Acute Care - Physical Therapy Discharge Summary  [facility]       Patient Name: Kasey Rios  : 1950  MRN: [id number]    Today's Date: 2019  Onset of Illness/Injury or Date of Surgery: 19    Date of Referral to PT: 19  Referring Physician: Dr. Osorio      Admit Date: 2019      PT Recommendation and Plan    Visit Dx:    ICD-10-CM ICD-9-CM   1. Hospital-acquired pneumonia J18.9 486   2. Impaired mobility Z74.09 799.89   3. Decreased activities of daily living (ADL) R68.89 780.99       Outcome Measures     Row Name 19 1500 19 1300          How much help from another person do you currently need...    Turning from your back to your side while in flat bed without using bedrails?  --  3  -SB     Moving from lying on back to sitting on the side of a flat bed without bedrails?  --  3  -SB     Moving to and from a bed to a chair (including a wheelchair)?  --  1  -SB     Standing up from a chair using your arms (e.g., wheelchair, bedside chair)?  --  1  -SB     Climbing 3-5 steps with a railing?  --  1  -SB     To walk in hospital room?  --  1  -SB     AM-PAC 6 Clicks Score  --  10  -SB        How much help from another is currently needed...    Putting on and taking off regular lower body clothing?  2  -TR  --     Bathing (including washing, rinsing, and drying)  2  -TR  --     Toileting (which includes using toilet bed pan or urinal)  2  -TR  --     Putting on and taking off regular upper body clothing  3  -TR  --     Taking care of personal grooming (such as brushing teeth)  4  -TR  --     Eating meals  4  -TR  --     Score  17  -TR  --        Functional Assessment    Outcome Measure Options  AM-PAC 6 Clicks Daily Activity (OT)  -TR  AM-PAC 6 Clicks Basic Mobility (PT)  -SB       User Key  (r) = Recorded By, (t) = Taken By, (c) = Cosigned By    Initials Name Provider Type    Karmen Baker, OTR/L Occupational Therapist    Maria Antonia Pace, PT Physical Therapist             Therapy Suggested Charges     Code   Minutes Charges    None             Rehab Goal Summary     Row Name 01/21/19 0808             Physical Therapy Goals    Bed Mobility Goal Selection (PT)  bed mobility, PT goal 1  -CW      Transfer Goal Selection (PT)  transfer, PT goal 1  -CW      Gait Training Goal Selection (PT)  gait training, PT goal 1  -CW         Bed Mobility Goal 1 (PT)    Activity/Assistive Device (Bed Mobility Goal 1, PT)  rolling to left;rolling to right  -CW      [place] Level/Cues Needed (Bed Mobility Goal 1, PT)  conditional independence  -CW      Time Frame (Bed Mobility Goal 1, PT)  by discharge  -CW      Progress/Outcomes (Bed Mobility Goal 1, PT)  goal not met  -CW         Transfer Goal 1 (PT)    Activity/Assistive Device (Transfer Goal 1, PT)  sit-to-stand/stand-to-sit possibly sidelying to stand because pt has pain with sitting  -CW      [place] Level/Cues Needed (Transfer Goal 1, PT)  moderate assist (50-74% patient effort)  -CW      Time Frame (Transfer Goal 1, PT)  by discharge  -CW      Progress/Outcome (Transfer Goal 1, PT)  goal not met  -CW         Gait Training Goal 1 (PT)    Activity/Assistive Device (Gait Training Goal 1, PT)  gait (walking locomotion);walker, rolling  -CW      [place] Level (Gait Training Goal 1, PT)  minimum assist (75% or more patient effort)  -CW      Distance (Gait Goal 1, PT)  10  -CW      Time Frame (Gait Training Goal 1, PT)  by discharge  -CW      Progress/Outcome (Gait Training Goal 1, PT)  goal not met  -CW        User Key  (r) = Recorded By, (t) = Taken By, (c) = Cosigned By    Initials Name Provider Type Discipline    CW Lavern Segura PTA Physical Therapy Assistant PT              PT Discharge Summary  Reason for Discharge: Discharge from facility  Outcomes Achieved: Refer to plan of care for updates on goals achieved  Discharge Destination: Home      Lavern Segura PTA   1/21/2019

## 2019-01-22 ENCOUNTER — HOSPITAL ENCOUNTER (OUTPATIENT)
Dept: RADIATION ONCOLOGY | Facility: HOSPITAL | Age: 69
Discharge: HOME | End: 2019-01-22

## 2019-01-22 ENCOUNTER — READMISSION MANAGEMENT (OUTPATIENT)
Dept: CALL CENTER | Facility: HOSPITAL | Age: 69
End: 2019-01-22

## 2019-01-22 PROCEDURE — 77412 RADIATION TX DELIVERY LVL 3: CPT | Performed by: RADIOLOGY

## 2019-01-22 NOTE — OUTREACH NOTE
Sepsis Week 1 Survey      Responses   Facility patient discharged from?  [place]   Does the patient have one of the following disease processes/diagnoses(primary or secondary)?  Sepsis   Is there a successful TCM telephone encounter documented?  No   Week 1 attempt successful?  No   Unsuccessful attempts  Attempt 1          Clau Arroyo RN

## 2019-01-23 ENCOUNTER — HOSPITAL ENCOUNTER (OUTPATIENT)
Dept: RADIATION ONCOLOGY | Facility: HOSPITAL | Age: 69
Discharge: HOME | End: 2019-01-23

## 2019-01-23 ENCOUNTER — READMISSION MANAGEMENT (OUTPATIENT)
Dept: CALL CENTER | Facility: HOSPITAL | Age: 69
End: 2019-01-23

## 2019-01-23 LAB — BACTERIA SPEC AEROBE CULT: NORMAL

## 2019-01-23 PROCEDURE — 77412 RADIATION TX DELIVERY LVL 3: CPT | Performed by: RADIOLOGY

## 2019-01-23 NOTE — OUTREACH NOTE
Sepsis Week 1 Survey      Responses   Facility patient discharged from?  [place]   Does the patient have one of the following disease processes/diagnoses(primary or secondary)?  Sepsis   Is there a successful TCM telephone encounter documented?  No   Week 1 attempt successful?  No   Unsuccessful attempts  Attempt 2          Sherri Mcleod RN

## 2019-01-24 ENCOUNTER — HOSPITAL ENCOUNTER (OUTPATIENT)
Dept: RADIATION ONCOLOGY | Facility: HOSPITAL | Age: 69
Discharge: HOME | End: 2019-01-24

## 2019-01-24 PROCEDURE — 77412 RADIATION TX DELIVERY LVL 3: CPT | Performed by: RADIOLOGY

## 2019-01-24 PROCEDURE — 77336 RADIATION PHYSICS CONSULT: CPT | Performed by: RADIOLOGY

## 2019-01-24 PROCEDURE — 77417 THER RADIOLOGY PORT IMAGE(S): CPT | Performed by: RADIOLOGY

## 2019-01-25 ENCOUNTER — HOSPITAL ENCOUNTER (OUTPATIENT)
Dept: RADIATION ONCOLOGY | Facility: HOSPITAL | Age: 69
Discharge: HOME | End: 2019-01-25

## 2019-01-25 PROCEDURE — 77412 RADIATION TX DELIVERY LVL 3: CPT | Performed by: RADIOLOGY

## 2019-01-28 ENCOUNTER — READMISSION MANAGEMENT (OUTPATIENT)
Dept: CALL CENTER | Facility: HOSPITAL | Age: 69
End: 2019-01-28

## 2019-01-28 NOTE — OUTREACH NOTE
Sepsis Week 2 Survey      Responses   Facility patient discharged from?  [place]   Does the patient have one of the following disease processes/diagnoses(primary or secondary)?  Sepsis   Week 2 attempt successful?  No   Unsuccessful attempts  Attempt 1          Stephanie Garza RN

## 2019-01-29 ENCOUNTER — DOCUMENTATION (OUTPATIENT)
Dept: ONCOLOGY | Facility: HOSPITAL | Age: 69
End: 2019-01-29

## 2019-01-29 ENCOUNTER — HOSPITAL ENCOUNTER (OUTPATIENT)
Dept: RADIATION ONCOLOGY | Facility: HOSPITAL | Age: 69
Discharge: HOME | End: 2019-01-29

## 2019-01-29 ENCOUNTER — READMISSION MANAGEMENT (OUTPATIENT)
Dept: CALL CENTER | Facility: HOSPITAL | Age: 69
End: 2019-01-29

## 2019-01-29 ENCOUNTER — TELEPHONE (OUTPATIENT)
Dept: INTERNAL MEDICINE | Facility: CLINIC | Age: 69
End: 2019-01-29

## 2019-01-29 ENCOUNTER — OFFICE VISIT (OUTPATIENT)
Dept: INTERNAL MEDICINE | Facility: CLINIC | Age: 69
End: 2019-01-29

## 2019-01-29 VITALS
DIASTOLIC BLOOD PRESSURE: 60 MMHG | SYSTOLIC BLOOD PRESSURE: 108 MMHG | HEIGHT: 60 IN | RESPIRATION RATE: 16 BRPM | HEART RATE: 93 BPM | BODY MASS INDEX: 21.6 KG/M2 | WEIGHT: 110 LBS | OXYGEN SATURATION: 95 %

## 2019-01-29 DIAGNOSIS — S32.030D CLOSED COMPRESSION FRACTURE OF L3 LUMBAR VERTEBRA WITH ROUTINE HEALING, SUBSEQUENT ENCOUNTER: ICD-10-CM

## 2019-01-29 DIAGNOSIS — K21.9 GASTROESOPHAGEAL REFLUX DISEASE WITHOUT ESOPHAGITIS: ICD-10-CM

## 2019-01-29 DIAGNOSIS — C79.9 MULTIPLE LESIONS OF METASTATIC MALIGNANCY (HCC): ICD-10-CM

## 2019-01-29 DIAGNOSIS — C77.9 REGIONAL LYMPH NODE METASTASIS PRESENT (HCC): ICD-10-CM

## 2019-01-29 DIAGNOSIS — G89.3 CANCER RELATED PAIN: ICD-10-CM

## 2019-01-29 DIAGNOSIS — F33.41 RECURRENT MAJOR DEPRESSIVE DISORDER, IN PARTIAL REMISSION (HCC): ICD-10-CM

## 2019-01-29 DIAGNOSIS — Z71.89 COUNSELING REGARDING ADVANCED CARE PLANNING AND GOALS OF CARE: ICD-10-CM

## 2019-01-29 DIAGNOSIS — C51.9: Primary | ICD-10-CM

## 2019-01-29 DIAGNOSIS — C7A.8 NEUROENDOCRINE CARCINOMA METASTATIC TO BONE (HCC): ICD-10-CM

## 2019-01-29 DIAGNOSIS — C7B.8 NEUROENDOCRINE CARCINOMA METASTATIC TO BONE (HCC): ICD-10-CM

## 2019-01-29 DIAGNOSIS — L89.152 PRESSURE INJURY OF SACRAL REGION, STAGE 2 (HCC): ICD-10-CM

## 2019-01-29 PROCEDURE — 99497 ADVNCD CARE PLAN 30 MIN: CPT | Performed by: INTERNAL MEDICINE

## 2019-01-29 PROCEDURE — 99204 OFFICE O/P NEW MOD 45 MIN: CPT | Performed by: INTERNAL MEDICINE

## 2019-01-29 PROCEDURE — 77412 RADIATION TX DELIVERY LVL 3: CPT | Performed by: RADIOLOGY

## 2019-01-29 NOTE — OUTREACH NOTE
Sepsis Week 2 Survey      Responses   Facility patient discharged from?  [place]   Does the patient have one of the following disease processes/diagnoses(primary or secondary)?  Sepsis   Week 2 attempt successful?  Yes   Call start time  1422   Rescheduled  Rescheduled-pt requested [at the MD office]   Call end time  1422   General alerts for this patient  # to reach patient--[phone],  she is staying with family   Discharge diagnosis  sepsis,  pneumonia          Michelle Chatman RN

## 2019-01-29 NOTE — PROGRESS NOTES
Met with patient's brother on this date who inquires information about living espinosa, dnr, and power of .  Answered questions and gave patient educational information/brochures regarding how to proceed.  They are planning to meet with [facility] on this date.  Offered to make an appt for patient to complete here and they would like to get done as soon as possible due to decline in patient cognitive functioning.  Family member to contact this worker for time to complete acp if deciding to do so.

## 2019-01-30 ENCOUNTER — READMISSION MANAGEMENT (OUTPATIENT)
Dept: CALL CENTER | Facility: HOSPITAL | Age: 69
End: 2019-01-30

## 2019-01-30 PROBLEM — R91.8 MASS OF LEFT LUNG: Status: RESOLVED | Noted: 2019-01-03 | Resolved: 2019-01-30

## 2019-01-30 PROBLEM — F33.41 RECURRENT MAJOR DEPRESSIVE DISORDER, IN PARTIAL REMISSION (HCC): Status: ACTIVE | Noted: 2019-01-30
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
## 2019-01-30 PROBLEM — J18.9 HOSPITAL-ACQUIRED PNEUMONIA: Status: RESOLVED | Noted: 2019-01-16 | Resolved: 2019-01-30

## 2019-01-30 PROBLEM — K21.9 GASTROESOPHAGEAL REFLUX DISEASE WITHOUT ESOPHAGITIS: Status: ACTIVE | Noted: 2019-01-30

## 2019-01-30 PROBLEM — Z87.891 FORMER SMOKER: Status: ACTIVE | Noted: 2018-12-28

## 2019-01-30 PROBLEM — Z71.9 ENCOUNTER FOR CONSULTATION: Status: RESOLVED | Noted: 2018-12-28 | Resolved: 2019-01-30

## 2019-01-30 RX ORDER — FLUOXETINE HYDROCHLORIDE 20 MG/1
20 CAPSULE ORAL DAILY
Qty: 30 CAPSULE | Refills: 0 | Status: SHIPPED | OUTPATIENT
Start: 2019-01-30 | End: 2019-03-25 | Stop reason: SDUPTHER

## 2019-01-30 NOTE — PROGRESS NOTES
"CC: Establish care for vulvar cancer    History:  Kasey Rios is a 68 y.o. female who presents today for evaluation of the above problems.  She was recently admitted secondary to pain.  She notes her pain persists and is a 10 out of 10 at worst, decreases to a 7 out of 10 with 1 tablet of oxycodone and down to a 4/10 with 2 tablets of oxycodone.  She has just finished radiation for her vulvar cancer and notes she has a good deal of \"raw feeling\" pain in the perineal area.  She also has pain in her back secondary to history of a compression fracture.  She has no neuroendocrine carcinoma that has spread to the bone.  Her fentanyl patch was increased to 50 µg recently.  She is wearing those in the clavicular fossa of the right side.  She notes she always uses them in the same place.  She has no symptoms of acid reflux on PPI.  She has been prescribed benzodiazepine to help with anxiety, but she does not like the way it makes her feel and she takes it only rarely.  She continues on Prozac to help with depression and notes no uncontrolled symptoms at this time.    ROS:  Review of Systems   Constitutional: Negative for chills and fever.   HENT: Negative for congestion and sore throat.    Eyes: Negative for visual disturbance.   Respiratory: Negative for cough and shortness of breath.    Cardiovascular: Negative for chest pain, palpitations and leg swelling.   Gastrointestinal: Negative for abdominal pain, constipation and nausea.   Endocrine: Negative for cold intolerance and heat intolerance.   Genitourinary: Negative for difficulty urinating and frequency.   Musculoskeletal: Positive for arthralgias, back pain and gait problem.   Skin: Positive for wound (vulvar and coccygeal.). Negative for rash.   Neurological: Negative for dizziness and headaches.   Psychiatric/Behavioral: Negative for dysphoric mood. The patient is not nervous/anxious.        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) 12/8/2018    Added automatically from request for surgery [id number]     Past Surgical History:   Procedure Laterality Date   • APPENDECTOMY     • ECTOPIC PREGNANCY  1975    Ruptured ectopic   • KYPHOPLASTY WITH BIOPSY N/A 12/10/2018    Procedure: KYPHOPLASTY WITH BIOPSY RADIOFREQUENCY TREATMENT TO TUMOR;  Surgeon: Erwin Benitez MD;  Location:  [facility] OR;  Service: Neurosurgery   • LUMBAR LAMINECTOMY N/A 12/10/2018    Procedure: LUMBAR LAMINECTOMY WITHOUT FUSION L3 REMOVAL SPINAL TUMOR;  Surgeon: Erwin Benitez MD;  Location:  [facility] OR;  Service: Neurosurgery   • TOTAL ABDOMINAL HYSTERECTOMY WITH SALPINGO OOPHORECTOMY  1978    DUB, Fibroids, benign   • VENOUS ACCESS DEVICE (PORT) INSERTION N/A 12/11/2018    Procedure: INSERTION VENOUS ACCESS DEVICE;  Surgeon: Ellie Griffith MD;  Location:  [facility] OR;  Service: General   • VULVA BIOPSY Bilateral 12/10/2018    Procedure: VULVA BIOPSY;  Surgeon: Alonzo Lugo MD;  Location:  [facility] OR;  Service: Obstetrics/Gynecology     Family History   Problem Relation Age of Onset   • Cancer Mother    • Diabetes Father    • Heart disease Father       reports that she quit smoking about 7 weeks ago. Her smoking use included cigarettes. She has a 120.00 pack-year smoking history. she has never used smokeless tobacco. She reports that she does not drink alcohol or use drugs.      Current Outpatient Medications:   •  ALPRAZolam (XANAX) 0.5 MG tablet, Take 0.5 mg by mouth 2 (Two) Times a Day As Needed for Anxiety., Disp: , Rfl:   •  aspirin 81 MG chewable tablet, Chew 81 mg Daily., Disp: , Rfl:   •  fentaNYL (DURAGESIC) 25 MCG/HR patch, Use 2 patches on the skin every 72 hours total of 50 mcg/hr (Patient taking differently: Place 1 patch on the skin as directed by provider Every 72 (Seventy-Two) Hours. Use 2 patches on the skin every 72 hours total of 50 mcg/hr), Disp: , Rfl:   •   "FLUoxetine (PROzac) 20 MG capsule, Take 1 capsule by mouth Daily., Disp: 30 capsule, Rfl: 0  •  megestrol (MEGACE) 40 MG/ML suspension, Take 10 mL by mouth Daily., Disp: 300 mL, Rfl: 0  •  oxyCODONE-acetaminophen (PERCOCET)  MG per tablet, Take 2 tablets by mouth Every 6 (Six) Hours As Needed for Moderate Pain ., Disp: 240 tablet, Rfl: 0  •  pantoprazole (PROTONIX) 20 MG EC tablet, Take 1 tablet by mouth Daily., Disp: 30 tablet, Rfl: 2  •  saccharomyces boulardii (FLORASTOR) 250 MG capsule, Take 1 capsule by mouth 2 (Two) Times a Day., Disp: 20 capsule, Rfl: 0    OBJECTIVE:  /60 (BP Location: Right arm, Patient Position: Lying, Cuff Size: Adult)   Pulse 93   Resp 16   Ht 152.4 cm (60\")   Wt 49.9 kg (110 lb)   SpO2 95%   Breastfeeding? No   BMI 21.48 kg/m²    Physical Exam   Constitutional: She is oriented to person, place, and time. She appears well-developed. No distress.   HENT:   Head: Normocephalic and atraumatic.   Right Ear: External ear normal.   Left Ear: External ear normal.   Nose: Nose normal.   Mouth/Throat: Oropharynx is clear and moist.   Eyes: EOM are normal. No scleral icterus.   Neck: Normal range of motion. No tracheal deviation present.   Cardiovascular: Normal rate, regular rhythm and normal heart sounds.   No murmur heard.  Pulmonary/Chest: Effort normal and breath sounds normal. No accessory muscle usage. No respiratory distress. She has no wheezes.   Abdominal: Soft. Bowel sounds are normal. She exhibits no distension. There is no tenderness.   Musculoskeletal: Normal range of motion.   Neurological: She is alert and oriented to person, place, and time. Coordination and gait normal.   Skin: Skin is warm and dry. No cyanosis. Nails show no clubbing.   No jaundice.  There is a stage 2 friction injury at the superior aspect of the gluteal cleft overlying the coccyx.  There is no surrounding erythema.   Psychiatric: She has a normal mood and affect. Her mood appears not anxious. " She does not exhibit a depressed mood.       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  She is on fentanyl at 50 µg every 72 hours.  She is currently putting these in the clavicular fossa, but is encouraged to rotate these to an area more closely related to adipose tissue, such as her flank or abdominal area.  She is also encouraged to use 2 of her oxycodone tablets at the same time to better address her breakthrough pain.  Most of her pain is in the vulvar area and I will be curious to see if her pain decreases with the removal of daily radiotherapy as an insult to the area.  Given completion, I will have my office contact hospice so they may reassess her eligibility and likely readmit.  I have spoken with her brother and son who are present with her and they are aware that the maximal amount of resources she will be able to receive in the home would be provided through hospice.  There is no other service covered by Medicare that would provide her as much care as she would get through them.    Recurrent major depressive disorder, in partial remission (CMS/HCC)  -     FLUoxetine (PROzac) 20 MG capsule; Take 1 capsule by mouth Daily.  This has done well and refill of Prozac is sent to her pharmacy.    Gastroesophageal reflux disease without esophagitis  Stable without exacerbation on PPI.    Pressure injury of sacral region, stage 2  This is visualized.  Recommend careful attention to turning.  She may apply a barrier cream such as a and D ointment or follow wound instructions per  hospice.    Counseling regarding advanced care planning and goals of care  18 minutes were spent with Mrs. Rios, her brother, and her son.  We discussed her overall prognosis being very poor.  She has no other chemotherapy options at this time given her poor functional status.  She has completed her palliative radiation.  After review of notes from Carmen Ramos and Krista around the time of her last visit, they are feeling was that she should reenroll in hospice with the completion of her radiation through the care choices program.  She admits she is not completely satisfied with this reality, but admits that she does need resources and has been appreciative of hospice in the past.  We did discuss that document such as the living will and most form can be helpful in delineating her wishes.  They received information regarding these from [facility] this morning and would like to further review these before executing these documents.  Hospice could be helpful in completing these as well.  I will not plan to see Mrs. Rios back in the office, but I am available to serve as her hospice attending physician, PCP, or in what ever role she needs to receive the best care possible.    An After Visit Summary was printed and given to the patient at discharge.  Return if symptoms worsen or fail to improve.         Palomo Valenzuela D.O. 1/30/2019

## 2019-01-30 NOTE — OUTREACH NOTE
Sepsis Week 2 Survey      Responses   Facility patient discharged from?  [place]   Does the patient have one of the following disease processes/diagnoses(primary or secondary)?  Sepsis   Week 2 attempt successful?  No   Revoke  Decline to participate          Yessenia Stephen RN

## 2019-03-25 DIAGNOSIS — F33.41 RECURRENT MAJOR DEPRESSIVE DISORDER, IN PARTIAL REMISSION (HCC): ICD-10-CM

## 2019-03-25 RX ORDER — FLUOXETINE HYDROCHLORIDE 20 MG/1
20 CAPSULE ORAL DAILY
Qty: 30 CAPSULE | Refills: 0 | Status: SHIPPED | OUTPATIENT
Start: 2019-03-25

## 2019-03-29 RX ORDER — PANTOPRAZOLE SODIUM 20 MG/1
TABLET, DELAYED RELEASE ORAL
Qty: 30 TABLET | Refills: 0 | OUTPATIENT
Start: 2019-03-29

## 2019-05-21 ENCOUNTER — HOSPITAL ENCOUNTER (OUTPATIENT)
Age: 69
Setting detail: SPECIMEN
Discharge: HOME | End: 2019-05-21
Payer: MEDICARE

## 2019-05-21 LAB
BILIRUBIN URINE: NEGATIVE
BLOOD, URINE: NEGATIVE
CLARITY: CLEAR
COLOR: ABNORMAL
GLUCOSE URINE: NEGATIVE MG/DL
KETONES, URINE: NEGATIVE MG/DL
LEUKOCYTE ESTERASE, URINE: ABNORMAL
NITRITE, URINE: NEGATIVE
PH UA: 6.5 (ref 5–8)
PROTEIN UA: NEGATIVE MG/DL
SPECIFIC GRAVITY UA: 1.01 (ref 1–1.03)
URINE REFLEX TO CULTURE: YES
UROBILINOGEN, URINE: >=8 E.U./DL

## 2019-05-21 PROCEDURE — 87086 URINE CULTURE/COLONY COUNT: CPT

## 2019-05-21 PROCEDURE — 81001 URINALYSIS AUTO W/SCOPE: CPT

## 2019-05-22 LAB
EPITHELIAL CELLS, UA: NORMAL /HPF
WBC UA: NORMAL /HPF (ref 0–5)

## 2019-05-23 LAB — URINE CULTURE, ROUTINE: NORMAL

## 2020-01-08 NOTE — PROGRESS NOTES
Social work consult requested on this date.  Patient present for radiation therapy consult.  She has several family members accompanying her on this date including her two sons, and sister.  She has lived with her son following discharge from hospital.  She feels encouraged after meeting with Dr. Camacho on this date.  She has an appt with Dr. Ramos on Thursday who will be her oncologist.  They report that her biggest source of stress right now is managing pain.  Dr. Benitez had recently increased the pain medication which they still feel is not controlled.  They plan to discuss this on Thursday with oncologist and also continuing home health referral.  It is arranged for patient to be brought by ambulance due to inability to sit for long periods of time.  They were able to have this arranged with insurance reimbursing and states that today's trip went ok.  Patient and or family to contact this worker for further assistance if needed.     d/c solucortef  c/w prednisone 60 daily home dose

## 2023-02-07 NOTE — PLAN OF CARE
Problem: Patient Care Overview  Goal: Plan of Care Review  Outcome: Ongoing (interventions implemented as appropriate)   01/17/19 1672   Coping/Psychosocial   Plan of Care Reviewed With patient   Plan of Care Review   Progress no change   OTHER   Outcome Summary Initial RDN eval. Pt meets criteria for malnutrition, please see progress note for details. Encouraged intake, advised of alt selections, and added Boost Plus TID. She is on a Regular diet. Will continue to follow.          Prednisone Counseling:  I discussed with the patient the risks of prolonged use of prednisone including but not limited to weight gain, insomnia, osteoporosis, mood changes, diabetes, susceptibility to infection, glaucoma and high blood pressure.  In cases where prednisone use is prolonged, patients should be monitored with blood pressure checks, serum glucose levels and an eye exam.  Additionally, the patient may need to be placed on GI prophylaxis, PCP prophylaxis, and calcium and vitamin D supplementation and/or a bisphosphonate.  The patient verbalized understanding of the proper use and the possible adverse effects of prednisone.  All of the patient's questions and concerns were addressed.

## 2025-05-19 NOTE — ANESTHESIA PREPROCEDURE EVALUATION
Anesthesia Evaluation     Patient summary reviewed   no history of anesthetic complications:  NPO Solid Status: > 8 hours  NPO Liquid Status: > 8 hours           Airway   Mallampati: I  TM distance: >3 FB  Neck ROM: full  No difficulty expected  Dental - normal exam     Pulmonary - normal exam   (+) a smoker Current,   Cardiovascular - normal exam  Exercise tolerance: good (4-7 METS)    (+) hypertension well controlled less than 2 medications,       Neuro/Psych  (+) headaches,     GI/Hepatic/Renal/Endo - negative ROS     Musculoskeletal (-) negative ROS    Abdominal  - normal exam   Substance History - negative use     OB/GYN          Other      history of cancer active                    Anesthesia Plan    ASA 3     general   total IV anesthesia  intravenous induction   Anesthetic plan, all risks, benefits, and alternatives have been provided, discussed and informed consent has been obtained with: patient.       Cataract extraction

## (undated) DEVICE — PK TURNOVER RM ADV

## (undated) DEVICE — DECANTER: Brand: UNBRANDED

## (undated) DEVICE — BONE BIOPSY DEVICE F05A BBD SIZE 3: Brand: MEDTRONIC REUSABLE INSTRUMENTS

## (undated) DEVICE — PAD MINOR UNIVERSAL: Brand: MEDLINE INDUSTRIES, INC.

## (undated) DEVICE — SPK10277 JACKSON/PRO-AXIS KIT: Brand: SPK10277 JACKSON/PRO-AXIS KIT

## (undated) DEVICE — GOWN,NON-REINFORCED,SIRUS,SET IN SLV,XXL: Brand: MEDLINE

## (undated) DEVICE — MAJOR DOUBLE BASIN W/GOWNS II: Brand: MEDLINE INDUSTRIES, INC.

## (undated) DEVICE — ADHS LIQ MASTISOL 2/3ML

## (undated) DEVICE — BONE TAMP KIT KPX203PB FF X2 20/3 1 STP: Brand: KYPHOPAK™ TRAY

## (undated) DEVICE — GLV SURG BIOGEL LTX PF 8

## (undated) DEVICE — SUT VIC 0 MO4 CR8 18IN VCP701D

## (undated) DEVICE — PROBE OCP220 OSTEOCOOL RF 17G 20MMX2: Brand: OSTEOCOOL™ RF ABLATION SYSTEM

## (undated) DEVICE — ANTIBACTERIAL UNDYED BRAIDED (POLYGLACTIN 910), SYNTHETIC ABSORBABLE SUTURE: Brand: COATED VICRYL

## (undated) DEVICE — CATH IV ANGIO FEP 12G 3IN LTBLU 10PK

## (undated) DEVICE — 3M™ STERI-STRIP™ REINFORCED ADHESIVE SKIN CLOSURES, R1546, 1/4 IN X 4 IN (6 MM X 100 MM), 10 STRIPS/ENVELOPE: Brand: 3M™ STERI-STRIP™

## (undated) DEVICE — SUT PROLN 2/0 SH 36IN 8523H

## (undated) DEVICE — GLV SURG BIOGEL M LTX PF 7 1/2

## (undated) DEVICE — SHEET, T, LAPAROTOMY, STERILE: Brand: MEDLINE

## (undated) DEVICE — ELECTRD NDL EDGE/STD 2.84IN

## (undated) DEVICE — ANTIBACTERIAL VIOLET BRAIDED (POLYGLACTIN 910), SYNTHETIC ABSORBABLE SUTURE: Brand: COATED VICRYL

## (undated) DEVICE — GLV SURG BIOGEL LTX PF 6 1/2

## (undated) DEVICE — UTILITY MARKER W/MED LABELS: Brand: MEDLINE

## (undated) DEVICE — PK SPINE POST 30

## (undated) DEVICE — DRSNG SURESITE WNDW 4X4.5

## (undated) DEVICE — RESERVOIR,SUCTION,100CC,SILICONE: Brand: MEDLINE

## (undated) DEVICE — PROBE 8225101E PRASS STD PROT HANDLE

## (undated) DEVICE — 4-PORT MANIFOLD: Brand: NEPTUNE 2

## (undated) DEVICE — PAD GRND REM POLYHESIVE A/ DISP

## (undated) DEVICE — SPNG GZ 2S 2X2 8PLY STRL PK/2

## (undated) DEVICE — DRN JP FLT NO TROC SIL FUL/PERF 7MM

## (undated) DEVICE — GLV SURG SENSICARE W/ALOE PF LF 8 STRL

## (undated) DEVICE — CLTH CLENS READYCLEANSE PERI CARE PK/5

## (undated) DEVICE — CVR UNIV C/ARM

## (undated) DEVICE — TRY PREP SCRB VAG PVP

## (undated) DEVICE — SYR SLP TP 10ML DISP

## (undated) DEVICE — ELECTRD BLD EXT EDGE/INSUL 1P 4IN

## (undated) DEVICE — APPL CHLORAPREP W/TINT 26ML ORNG

## (undated) DEVICE — DISPOSABLE IRRIGATION CASSETTE: Brand: CORE

## (undated) DEVICE — 4.0MM PRECISION ROUND

## (undated) DEVICE — PAD,PREPPING,CUFFED,24X48,7",NONSTERILE: Brand: MEDLINE

## (undated) DEVICE — ELECTRD BLD EDGE/INSUL1P 2.4X5.1MM STRL

## (undated) DEVICE — CONN FLX BREATHE CIRCT

## (undated) DEVICE — SUT MNCRYL 4/0 PS2 27IN UD MCP426H

## (undated) DEVICE — GLV SURG TRIUMPH GREEN W/ALOE PF LTX 8 STRL